# Patient Record
Sex: FEMALE | Race: BLACK OR AFRICAN AMERICAN | NOT HISPANIC OR LATINO | Employment: FULL TIME | URBAN - METROPOLITAN AREA
[De-identification: names, ages, dates, MRNs, and addresses within clinical notes are randomized per-mention and may not be internally consistent; named-entity substitution may affect disease eponyms.]

---
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
## 2022-03-06 ENCOUNTER — HOSPITAL ENCOUNTER (EMERGENCY)
Facility: HOSPITAL | Age: 22
Discharge: HOME | End: 2022-03-06
Attending: EMERGENCY MEDICINE
Payer: MEDICAID

## 2022-03-06 VITALS
TEMPERATURE: 98 F | OXYGEN SATURATION: 100 % | DIASTOLIC BLOOD PRESSURE: 79 MMHG | SYSTOLIC BLOOD PRESSURE: 140 MMHG | RESPIRATION RATE: 18 BRPM | HEART RATE: 72 BPM | HEIGHT: 61 IN | WEIGHT: 220 LBS | BODY MASS INDEX: 41.54 KG/M2
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
## 2022-03-06 LAB
B-HCG UR QL: NEGATIVE
CTP QC/QA: YES

## 2022-03-06 PROCEDURE — 25000003 PHARM REV CODE 250: Performed by: EMERGENCY MEDICINE

## 2022-03-06 PROCEDURE — 81025 URINE PREGNANCY TEST: CPT | Performed by: EMERGENCY MEDICINE

## 2022-03-06 PROCEDURE — 99284 EMERGENCY DEPT VISIT MOD MDM: CPT | Mod: 25

## 2022-03-06 PROCEDURE — 63600175 PHARM REV CODE 636 W HCPCS: Performed by: EMERGENCY MEDICINE

## 2022-03-06 PROCEDURE — 96372 THER/PROPH/DIAG INJ SC/IM: CPT

## 2022-03-06 RX ORDER — KETOROLAC TROMETHAMINE 30 MG/ML
15 INJECTION, SOLUTION INTRAMUSCULAR; INTRAVENOUS
Status: COMPLETED | OUTPATIENT
Start: 2022-03-06 | End: 2022-03-06

## 2022-03-06 RX ORDER — IBUPROFEN 600 MG/1
600 TABLET ORAL EVERY 6 HOURS PRN
Qty: 20 TABLET | Refills: 0 | Status: SHIPPED | OUTPATIENT
Start: 2022-03-06

## 2022-03-06 RX ORDER — KETOROLAC TROMETHAMINE 30 MG/ML
10 INJECTION, SOLUTION INTRAMUSCULAR; INTRAVENOUS
Status: DISCONTINUED | OUTPATIENT
Start: 2022-03-06 | End: 2022-03-06

## 2022-03-06 RX ORDER — DEXTROMETHORPHAN HYDROBROMIDE, GUAIFENESIN 5; 100 MG/5ML; MG/5ML
650 LIQUID ORAL EVERY 8 HOURS
Qty: 30 TABLET | Refills: 0 | Status: SHIPPED | OUTPATIENT
Start: 2022-03-06

## 2022-03-06 RX ORDER — ACETAMINOPHEN 500 MG
1000 TABLET ORAL
Status: COMPLETED | OUTPATIENT
Start: 2022-03-06 | End: 2022-03-06

## 2022-03-06 RX ADMIN — KETOROLAC TROMETHAMINE 15 MG: 30 INJECTION, SOLUTION INTRAMUSCULAR at 10:03

## 2022-03-06 RX ADMIN — ACETAMINOPHEN 1000 MG: 500 TABLET ORAL at 09:03

## 2022-03-06 NOTE — Clinical Note
"Zoran"Marina Dubose was seen and treated in our emergency department on 3/6/2022.  She may return to work on 03/09/2022.       If you have any questions or concerns, please don't hesitate to call.      Gege Burkett MD"
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
## 2022-10-10 ENCOUNTER — HOSPITAL ENCOUNTER (EMERGENCY)
Facility: HOSPITAL | Age: 22
Discharge: LEFT BEFORE DISCHARGE | End: 2022-10-11
Attending: EMERGENCY MEDICINE
Payer: MEDICAID

## 2022-10-10 VITALS
WEIGHT: 205 LBS | DIASTOLIC BLOOD PRESSURE: 96 MMHG | SYSTOLIC BLOOD PRESSURE: 177 MMHG | OXYGEN SATURATION: 100 % | RESPIRATION RATE: 18 BRPM | HEIGHT: 61 IN | HEART RATE: 71 BPM | TEMPERATURE: 99 F | BODY MASS INDEX: 38.71 KG/M2

## 2022-10-10 DIAGNOSIS — R10.9 ABDOMINAL PAIN, UNSPECIFIED ABDOMINAL LOCATION: Primary | ICD-10-CM

## 2022-10-10 LAB
ALBUMIN SERPL BCP-MCNC: 4.2 G/DL (ref 3.5–5.2)
ALP SERPL-CCNC: 61 U/L (ref 55–135)
ALT SERPL W/O P-5'-P-CCNC: 14 U/L (ref 10–44)
ANION GAP SERPL CALC-SCNC: 9 MMOL/L (ref 8–16)
AST SERPL-CCNC: 20 U/L (ref 10–40)
B-HCG UR QL: NEGATIVE
BASOPHILS # BLD AUTO: 0.09 K/UL (ref 0–0.2)
BASOPHILS NFR BLD: 0.6 % (ref 0–1.9)
BILIRUB SERPL-MCNC: 0.5 MG/DL (ref 0.1–1)
BILIRUB UR QL STRIP: NEGATIVE
BUN SERPL-MCNC: 8 MG/DL (ref 6–20)
CALCIUM SERPL-MCNC: 9.5 MG/DL (ref 8.7–10.5)
CHLORIDE SERPL-SCNC: 104 MMOL/L (ref 95–110)
CLARITY UR: CLEAR
CO2 SERPL-SCNC: 27 MMOL/L (ref 23–29)
COLOR UR: YELLOW
CREAT SERPL-MCNC: 0.8 MG/DL (ref 0.5–1.4)
CTP QC/QA: YES
DIFFERENTIAL METHOD: ABNORMAL
EOSINOPHIL # BLD AUTO: 0.9 K/UL (ref 0–0.5)
EOSINOPHIL NFR BLD: 5.3 % (ref 0–8)
ERYTHROCYTE [DISTWIDTH] IN BLOOD BY AUTOMATED COUNT: 16.2 % (ref 11.5–14.5)
EST. GFR  (NO RACE VARIABLE): >60 ML/MIN/1.73 M^2
GLUCOSE SERPL-MCNC: 76 MG/DL (ref 70–110)
GLUCOSE UR QL STRIP: NEGATIVE
HCT VFR BLD AUTO: 39.8 % (ref 37–48.5)
HGB BLD-MCNC: 12.3 G/DL (ref 12–16)
HGB UR QL STRIP: NEGATIVE
IMM GRANULOCYTES # BLD AUTO: 0.05 K/UL (ref 0–0.04)
IMM GRANULOCYTES NFR BLD AUTO: 0.3 % (ref 0–0.5)
KETONES UR QL STRIP: NEGATIVE
LEUKOCYTE ESTERASE UR QL STRIP: NEGATIVE
LIPASE SERPL-CCNC: 24 U/L (ref 4–60)
LYMPHOCYTES # BLD AUTO: 4.1 K/UL (ref 1–4.8)
LYMPHOCYTES NFR BLD: 25.9 % (ref 18–48)
MCH RBC QN AUTO: 24.5 PG (ref 27–31)
MCHC RBC AUTO-ENTMCNC: 30.9 G/DL (ref 32–36)
MCV RBC AUTO: 79 FL (ref 82–98)
MONOCYTES # BLD AUTO: 0.8 K/UL (ref 0.3–1)
MONOCYTES NFR BLD: 5.1 % (ref 4–15)
NEUTROPHILS # BLD AUTO: 10 K/UL (ref 1.8–7.7)
NEUTROPHILS NFR BLD: 62.8 % (ref 38–73)
NITRITE UR QL STRIP: NEGATIVE
NRBC BLD-RTO: 0 /100 WBC
PH UR STRIP: 7 [PH] (ref 5–8)
PLATELET # BLD AUTO: 402 K/UL (ref 150–450)
PMV BLD AUTO: 9.8 FL (ref 9.2–12.9)
POTASSIUM SERPL-SCNC: 4.2 MMOL/L (ref 3.5–5.1)
PROT SERPL-MCNC: 7.6 G/DL (ref 6–8.4)
PROT UR QL STRIP: ABNORMAL
RBC # BLD AUTO: 5.02 M/UL (ref 4–5.4)
SODIUM SERPL-SCNC: 140 MMOL/L (ref 136–145)
SP GR UR STRIP: 1.03 (ref 1–1.03)
URN SPEC COLLECT METH UR: ABNORMAL
UROBILINOGEN UR STRIP-ACNC: NEGATIVE EU/DL
WBC # BLD AUTO: 15.98 K/UL (ref 3.9–12.7)

## 2022-10-10 PROCEDURE — 83690 ASSAY OF LIPASE: CPT | Performed by: EMERGENCY MEDICINE

## 2022-10-10 PROCEDURE — 81003 URINALYSIS AUTO W/O SCOPE: CPT | Performed by: EMERGENCY MEDICINE

## 2022-10-10 PROCEDURE — 81025 URINE PREGNANCY TEST: CPT | Performed by: EMERGENCY MEDICINE

## 2022-10-10 PROCEDURE — 85025 COMPLETE CBC W/AUTO DIFF WBC: CPT | Performed by: EMERGENCY MEDICINE

## 2022-10-10 PROCEDURE — 25500020 PHARM REV CODE 255: Performed by: EMERGENCY MEDICINE

## 2022-10-10 PROCEDURE — 99281 EMR DPT VST MAYX REQ PHY/QHP: CPT | Mod: 25

## 2022-10-10 PROCEDURE — 80053 COMPREHEN METABOLIC PANEL: CPT | Performed by: EMERGENCY MEDICINE

## 2022-10-10 RX ADMIN — IOHEXOL 100 ML: 350 INJECTION, SOLUTION INTRAVENOUS at 08:10

## 2022-10-11 NOTE — ED PROVIDER NOTES
"BP (!) 177/96   Pulse 71   Temp 98.6 °F (37 °C) (Oral)   Resp 18   Ht 5' 1" (1.549 m)   Wt 93 kg (205 lb)   LMP 09/28/2022   SpO2 100%   BMI 38.73 kg/m²       Presented with abdominal pain. Had protocol eval. Unable to get to ED bed given bed availability. Noted hypertension, increased wbc, normal UA. CT stable. Further eval not available as patient had left the department. Discharged as elopement.    Jorgito Mckeon MD MPH  10/11/2022 2:18 PM        Jorgito Mckeon Jr., MD  10/11/22 1418    "

## 2023-03-27 ENCOUNTER — HOSPITAL ENCOUNTER (EMERGENCY)
Facility: HOSPITAL | Age: 23
Discharge: HOME | End: 2023-03-28
Attending: EMERGENCY MEDICINE
Payer: MEDICAID

## 2023-03-27 DIAGNOSIS — Z3A.01 LESS THAN 8 WEEKS GESTATION OF PREGNANCY: ICD-10-CM

## 2023-03-27 DIAGNOSIS — R11.2 NAUSEA AND VOMITING, UNSPECIFIED VOMITING TYPE: Primary | ICD-10-CM

## 2023-03-27 LAB
ALBUMIN SERPL BCP-MCNC: 4.2 G/DL (ref 3.5–5.2)
ALP SERPL-CCNC: 64 U/L (ref 55–135)
ALT SERPL W/O P-5'-P-CCNC: 16 U/L (ref 10–44)
ANION GAP SERPL CALC-SCNC: 10 MMOL/L (ref 8–16)
AST SERPL-CCNC: 30 U/L (ref 10–40)
B-HCG UR QL: POSITIVE
BACTERIA #/AREA URNS HPF: NEGATIVE /HPF
BASOPHILS # BLD AUTO: 0.03 K/UL (ref 0–0.2)
BASOPHILS NFR BLD: 0.2 % (ref 0–1.9)
BILIRUB SERPL-MCNC: 1 MG/DL (ref 0.1–1)
BILIRUB UR QL STRIP: NEGATIVE
BUN SERPL-MCNC: 11 MG/DL (ref 6–20)
CALCIUM SERPL-MCNC: 8.9 MG/DL (ref 8.7–10.5)
CHLORIDE SERPL-SCNC: 104 MMOL/L (ref 95–110)
CLARITY UR: CLEAR
CO2 SERPL-SCNC: 23 MMOL/L (ref 23–29)
COLOR UR: YELLOW
CREAT SERPL-MCNC: 0.7 MG/DL (ref 0.5–1.4)
CTP QC/QA: YES
DIFFERENTIAL METHOD: ABNORMAL
EOSINOPHIL # BLD AUTO: 0 K/UL (ref 0–0.5)
EOSINOPHIL NFR BLD: 0 % (ref 0–8)
ERYTHROCYTE [DISTWIDTH] IN BLOOD BY AUTOMATED COUNT: 15.4 % (ref 11.5–14.5)
EST. GFR  (NO RACE VARIABLE): >60 ML/MIN/1.73 M^2
GLUCOSE SERPL-MCNC: 85 MG/DL (ref 70–110)
GLUCOSE UR QL STRIP: NEGATIVE
HCG INTACT+B SERPL-ACNC: NORMAL MIU/ML
HCT VFR BLD AUTO: 35.6 % (ref 37–48.5)
HGB BLD-MCNC: 11.3 G/DL (ref 12–16)
HGB UR QL STRIP: NEGATIVE
HYALINE CASTS #/AREA URNS LPF: 5 /LPF
IMM GRANULOCYTES # BLD AUTO: 0.07 K/UL (ref 0–0.04)
IMM GRANULOCYTES NFR BLD AUTO: 0.5 % (ref 0–0.5)
KETONES UR QL STRIP: ABNORMAL
LEUKOCYTE ESTERASE UR QL STRIP: NEGATIVE
LIPASE SERPL-CCNC: 26 U/L (ref 4–60)
LYMPHOCYTES # BLD AUTO: 1.8 K/UL (ref 1–4.8)
LYMPHOCYTES NFR BLD: 12.4 % (ref 18–48)
MCH RBC QN AUTO: 25.1 PG (ref 27–31)
MCHC RBC AUTO-ENTMCNC: 31.7 G/DL (ref 32–36)
MCV RBC AUTO: 79 FL (ref 82–98)
MICROSCOPIC COMMENT: ABNORMAL
MONOCYTES # BLD AUTO: 0.6 K/UL (ref 0.3–1)
MONOCYTES NFR BLD: 4.1 % (ref 4–15)
NEUTROPHILS # BLD AUTO: 12 K/UL (ref 1.8–7.7)
NEUTROPHILS NFR BLD: 82.8 % (ref 38–73)
NITRITE UR QL STRIP: NEGATIVE
NRBC BLD-RTO: 0 /100 WBC
PH UR STRIP: 7 [PH] (ref 5–8)
PLATELET # BLD AUTO: 448 K/UL (ref 150–450)
PMV BLD AUTO: 9.8 FL (ref 9.2–12.9)
POTASSIUM SERPL-SCNC: 3.7 MMOL/L (ref 3.5–5.1)
PROT SERPL-MCNC: 7.8 G/DL (ref 6–8.4)
PROT UR QL STRIP: ABNORMAL
RBC # BLD AUTO: 4.5 M/UL (ref 4–5.4)
RBC #/AREA URNS HPF: 1 /HPF (ref 0–4)
SODIUM SERPL-SCNC: 137 MMOL/L (ref 136–145)
SP GR UR STRIP: >1.03 (ref 1–1.03)
SQUAMOUS #/AREA URNS HPF: 4 /HPF
URN SPEC COLLECT METH UR: ABNORMAL
UROBILINOGEN UR STRIP-ACNC: ABNORMAL EU/DL
WBC # BLD AUTO: 14.55 K/UL (ref 3.9–12.7)
WBC #/AREA URNS HPF: 1 /HPF (ref 0–5)

## 2023-03-27 PROCEDURE — 81001 URINALYSIS AUTO W/SCOPE: CPT

## 2023-03-27 PROCEDURE — 84702 CHORIONIC GONADOTROPIN TEST: CPT

## 2023-03-27 PROCEDURE — 80053 COMPREHEN METABOLIC PANEL: CPT

## 2023-03-27 PROCEDURE — 99284 EMERGENCY DEPT VISIT MOD MDM: CPT

## 2023-03-27 PROCEDURE — 81025 URINE PREGNANCY TEST: CPT

## 2023-03-27 PROCEDURE — 85025 COMPLETE CBC W/AUTO DIFF WBC: CPT

## 2023-03-27 PROCEDURE — 83690 ASSAY OF LIPASE: CPT

## 2023-03-27 NOTE — FIRST PROVIDER EVALUATION
"Medical screening examination initiated.  I have conducted a focused provider triage encounter, findings are as follows:    Brief history of present illness:  Patient presents to ED for vomiting bright red blood.  Patient states it started this morning.  Patient states now when she vomits it is brown.  Patient states she went out drinking last night and drank a lot.  Patient denies any fever or diarrhea.    Vitals:    03/27/23 1451   BP: (!) 157/103   Pulse: 62   Resp: 16   Temp: 97.9 °F (36.6 °C)   TempSrc: Oral   SpO2: 100%   Weight: 90.9 kg (200 lb 6.4 oz)   Height: 5' 1" (1.549 m)       Pertinent physical exam:  Patient is awake and alert in no acute distress.    Brief workup plan:  Lab work, urine    Preliminary workup initiated; this workup will be continued and followed by the physician or advanced practice provider that is assigned to the patient when roomed.  "

## 2023-03-28 VITALS
HEIGHT: 61 IN | RESPIRATION RATE: 16 BRPM | DIASTOLIC BLOOD PRESSURE: 63 MMHG | BODY MASS INDEX: 37.83 KG/M2 | HEART RATE: 71 BPM | SYSTOLIC BLOOD PRESSURE: 116 MMHG | WEIGHT: 200.38 LBS | TEMPERATURE: 99 F | OXYGEN SATURATION: 100 %

## 2023-03-28 PROCEDURE — 25000003 PHARM REV CODE 250: Performed by: EMERGENCY MEDICINE

## 2023-03-28 PROCEDURE — 96365 THER/PROPH/DIAG IV INF INIT: CPT

## 2023-03-28 PROCEDURE — 63600175 PHARM REV CODE 636 W HCPCS: Performed by: EMERGENCY MEDICINE

## 2023-03-28 RX ORDER — PROMETHAZINE HYDROCHLORIDE 25 MG/1
25 TABLET ORAL EVERY 6 HOURS PRN
Qty: 15 TABLET | Refills: 0 | Status: SHIPPED | OUTPATIENT
Start: 2023-03-28

## 2023-03-28 RX ADMIN — SODIUM CHLORIDE, SODIUM LACTATE, POTASSIUM CHLORIDE, AND CALCIUM CHLORIDE 1000 ML: .6; .31; .03; .02 INJECTION, SOLUTION INTRAVENOUS at 12:03

## 2023-03-28 RX ADMIN — PROMETHAZINE HYDROCHLORIDE 12.5 MG: 25 INJECTION INTRAMUSCULAR; INTRAVENOUS at 12:03

## 2023-03-28 NOTE — ED PROVIDER NOTES
"Encounter Date: 3/27/2023       History     Chief Complaint   Patient presents with    Hemoptysis     Blood in vomit starting today, denies abd pain, vomiting because "I went out last night"     23-year-old female presented emergency department complaining of nausea and vomiting which started yesterday after she went out and was drinking.  Patient also said she vomited some blood along with vomit.  Patient said vomiting stopped and now feeling better however still has some nausea.  Patient upon arrival had a positive pregnancy test today.  Patient's last menstrual period was about a month ago.  Patient denies any abdominal pain.  Denies weakness or numbness.  Denies chest pain or shortness of breath.  Patient said she was drinking yesterday however did not know that she was pregnant.    Review of patient's allergies indicates:  No Known Allergies  Past Medical History:   Diagnosis Date    Hypertension      No past surgical history on file.  Family History   Problem Relation Age of Onset    No Known Problems Mother     Seizures Father     Hypertension Maternal Grandmother     Diabetes Maternal Grandmother     Hypertension Paternal Grandmother     Diabetes Paternal Grandmother     Hypertension Paternal Grandfather     Congenital heart disease Neg Hx     Arrhythmia Neg Hx     Pacemaker/defibrilator Neg Hx      Social History     Tobacco Use    Smoking status: Never   Substance Use Topics    Alcohol use: No    Drug use: No     Review of Systems   Constitutional: Negative.    HENT: Negative.     Eyes: Negative.    Respiratory: Negative.     Cardiovascular: Negative.  Negative for chest pain.   Gastrointestinal:  Positive for nausea and vomiting.   Endocrine: Negative.    Genitourinary: Negative.    Musculoskeletal: Negative.    Skin: Negative.    Allergic/Immunologic: Negative.    Neurological: Negative.    Hematological: Negative.    Psychiatric/Behavioral: Negative.     All other systems reviewed and are " negative.    Physical Exam     Initial Vitals [03/27/23 1451]   BP Pulse Resp Temp SpO2   (!) 157/103 62 16 97.9 °F (36.6 °C) 100 %      MAP       --         Physical Exam    Nursing note and vitals reviewed.  Constitutional: She appears well-developed and well-nourished.   HENT:   Head: Normocephalic and atraumatic.   Nose: Nose normal.   Mouth/Throat: Oropharynx is clear and moist.   Eyes: Conjunctivae and EOM are normal. Pupils are equal, round, and reactive to light.   Neck: Neck supple. No thyromegaly present. No tracheal deviation present. No JVD present.   Normal range of motion.  Cardiovascular:  Normal rate, regular rhythm, normal heart sounds and intact distal pulses.           No murmur heard.  Pulmonary/Chest: Breath sounds normal. No stridor. No respiratory distress. She has no wheezes. She has no rales.   Abdominal: Abdomen is soft. Bowel sounds are normal. She exhibits no distension. There is no abdominal tenderness.   Musculoskeletal:         General: No edema. Normal range of motion.      Cervical back: Normal range of motion and neck supple.     Neurological: She is alert and oriented to person, place, and time. She has normal strength. GCS score is 15. GCS eye subscore is 4. GCS verbal subscore is 5. GCS motor subscore is 6.   Skin: Skin is warm. Capillary refill takes less than 2 seconds.   Psychiatric: She has a normal mood and affect. Thought content normal.       ED Course   Procedures  Labs Reviewed   CBC W/ AUTO DIFFERENTIAL - Abnormal; Notable for the following components:       Result Value    WBC 14.55 (*)     Hemoglobin 11.3 (*)     Hematocrit 35.6 (*)     MCV 79 (*)     MCH 25.1 (*)     MCHC 31.7 (*)     RDW 15.4 (*)     Gran # (ANC) 12.0 (*)     Immature Grans (Abs) 0.07 (*)     Gran % 82.8 (*)     Lymph % 12.4 (*)     All other components within normal limits    Narrative:     For upper or mid abdominal pain.   URINALYSIS, REFLEX TO URINE CULTURE - Abnormal; Notable for the following  components:    Specific Gravity, UA >1.030 (*)     Protein, UA 1+ (*)     Ketones, UA 3+ (*)     Urobilinogen, UA 2.0-3.0 (*)     All other components within normal limits    Narrative:     In and Out Cath as needed it patient unable to void  Specimen Source->Urine   URINALYSIS MICROSCOPIC - Abnormal; Notable for the following components:    Hyaline Casts, UA 5 (*)     All other components within normal limits    Narrative:     In and Out Cath as needed it patient unable to void  Specimen Source->Urine   POCT URINE PREGNANCY - Abnormal; Notable for the following components:    POC Preg Test, Ur Positive (*)     All other components within normal limits   COMPREHENSIVE METABOLIC PANEL    Narrative:     For upper or mid abdominal pain.   LIPASE    Narrative:     For upper or mid abdominal pain.   HCG, QUANTITATIVE    Narrative:     Release to patient->Immediate          Imaging Results    None          Medications   lactated ringers bolus 1,000 mL (has no administration in time range)   promethazine (PHENERGAN) 12.5 mg in dextrose 5 % (D5W) 50 mL IVPB (has no administration in time range)     Medical Decision Making:   Differential Diagnosis:   23-year-old female presented emergency department with nausea and vomiting and vomited some blood yesterday.  Patient was drinking yesterday and was retching.  Likely had a mild Makayla-Mohan tear and now bleeding resolved.  Patient's symptoms did improve spontaneously.  Patient just found out she is pregnant in the emergency department.  Patient has nausea vomiting could be secondary to alcohol and also possibility is this could be secondary to her being pregnant.  Patient's symptoms did improve.  Patient hemodynamically stable.  Hemoglobin is stable as well.  Patient gently hydrated.  Nausea treated.  Discharged with return precautions and instructions and follow-up.  No more bleeding noted after that episode.  No abdominal pain or tenderness.  Clinical Tests:   Lab Tests:  Reviewed                        Clinical Impression:   Final diagnoses:  [R11.2] Nausea and vomiting, unspecified vomiting type (Primary)  [Z3A.01] Less than 8 weeks gestation of pregnancy        ED Disposition Condition    Discharge Stable          ED Prescriptions       Medication Sig Dispense Start Date End Date Auth. Provider    promethazine (PHENERGAN) 25 MG tablet Take 1 tablet (25 mg total) by mouth every 6 (six) hours as needed for Nausea. 15 tablet 3/28/2023 -- Chel Brown MD          Follow-up Information       Follow up With Specialties Details Why Contact Info    June Zaragoza MD Pediatrics In 2 days  [address] [place] [address]  [phone]      Lisandra Martin MD Obstetrics and Gynecology In 2 days  [address] [facility]  [place] [address]  [phone]               Chel Brown MD  03/28/23 0033       Chel Brown MD  03/28/23 0033

## 2023-04-04 NOTE — PATIENT INSTRUCTIONS
Patient is aware of negative celiac screen results  Plan:  Spoke to pt and gr parents regarding complications related to obesity and re enforced diet and exercise  Reg no added salt diet  Diet and exercise modification  Check BP at home with large size adult cuff and record. Call if consistently >130/80 mm Hg  RTC in 6 months or PRN

## 2023-06-11 ENCOUNTER — HOSPITAL ENCOUNTER (EMERGENCY)
Facility: HOSPITAL | Age: 23
Discharge: HOME | End: 2023-06-11
Attending: EMERGENCY MEDICINE
Payer: MEDICAID

## 2023-06-11 VITALS
BODY MASS INDEX: 41.35 KG/M2 | TEMPERATURE: 98 F | SYSTOLIC BLOOD PRESSURE: 128 MMHG | WEIGHT: 219 LBS | OXYGEN SATURATION: 100 % | HEART RATE: 72 BPM | RESPIRATION RATE: 18 BRPM | DIASTOLIC BLOOD PRESSURE: 79 MMHG | HEIGHT: 61 IN

## 2023-06-11 DIAGNOSIS — R10.9 ABDOMINAL PAIN: ICD-10-CM

## 2023-06-11 DIAGNOSIS — R10.9 FLANK PAIN: Primary | ICD-10-CM

## 2023-06-11 LAB
ALBUMIN SERPL BCP-MCNC: 3.4 G/DL (ref 3.5–5.2)
ALP SERPL-CCNC: 51 U/L (ref 55–135)
ALT SERPL W/O P-5'-P-CCNC: 13 U/L (ref 10–44)
ANION GAP SERPL CALC-SCNC: 8 MMOL/L (ref 8–16)
AST SERPL-CCNC: 15 U/L (ref 10–40)
BACTERIA #/AREA URNS HPF: NEGATIVE /HPF
BASOPHILS # BLD AUTO: 0.06 K/UL (ref 0–0.2)
BASOPHILS NFR BLD: 0.3 % (ref 0–1.9)
BILIRUB SERPL-MCNC: 0.3 MG/DL (ref 0.1–1)
BILIRUB UR QL STRIP: NEGATIVE
BUN SERPL-MCNC: 13 MG/DL (ref 6–20)
CALCIUM SERPL-MCNC: 8.8 MG/DL (ref 8.7–10.5)
CHLORIDE SERPL-SCNC: 107 MMOL/L (ref 95–110)
CLARITY UR: CLEAR
CO2 SERPL-SCNC: 22 MMOL/L (ref 23–29)
COLOR UR: YELLOW
CREAT SERPL-MCNC: 0.7 MG/DL (ref 0.5–1.4)
DIFFERENTIAL METHOD: ABNORMAL
EOSINOPHIL # BLD AUTO: 0.1 K/UL (ref 0–0.5)
EOSINOPHIL NFR BLD: 0.5 % (ref 0–8)
ERYTHROCYTE [DISTWIDTH] IN BLOOD BY AUTOMATED COUNT: 15.2 % (ref 11.5–14.5)
EST. GFR  (NO RACE VARIABLE): >60 ML/MIN/1.73 M^2
GLUCOSE SERPL-MCNC: 106 MG/DL (ref 70–110)
GLUCOSE UR QL STRIP: NEGATIVE
HCT VFR BLD AUTO: 35.7 % (ref 37–48.5)
HGB BLD-MCNC: 11.5 G/DL (ref 12–16)
HGB UR QL STRIP: ABNORMAL
HYALINE CASTS #/AREA URNS LPF: 5 /LPF
IMM GRANULOCYTES # BLD AUTO: 0.1 K/UL (ref 0–0.04)
IMM GRANULOCYTES NFR BLD AUTO: 0.5 % (ref 0–0.5)
KETONES UR QL STRIP: ABNORMAL
LEUKOCYTE ESTERASE UR QL STRIP: NEGATIVE
LIPASE SERPL-CCNC: 26 U/L (ref 4–60)
LYMPHOCYTES # BLD AUTO: 3.1 K/UL (ref 1–4.8)
LYMPHOCYTES NFR BLD: 16.9 % (ref 18–48)
MCH RBC QN AUTO: 25.6 PG (ref 27–31)
MCHC RBC AUTO-ENTMCNC: 32.2 G/DL (ref 32–36)
MCV RBC AUTO: 80 FL (ref 82–98)
MICROSCOPIC COMMENT: ABNORMAL
MONOCYTES # BLD AUTO: 1.1 K/UL (ref 0.3–1)
MONOCYTES NFR BLD: 5.7 % (ref 4–15)
NEUTROPHILS # BLD AUTO: 13.9 K/UL (ref 1.8–7.7)
NEUTROPHILS NFR BLD: 76.1 % (ref 38–73)
NITRITE UR QL STRIP: NEGATIVE
NRBC BLD-RTO: 0 /100 WBC
PH UR STRIP: 6 [PH] (ref 5–8)
PLATELET # BLD AUTO: 360 K/UL (ref 150–450)
PMV BLD AUTO: 9.7 FL (ref 9.2–12.9)
POTASSIUM SERPL-SCNC: 3.9 MMOL/L (ref 3.5–5.1)
PROT SERPL-MCNC: 6.9 G/DL (ref 6–8.4)
PROT UR QL STRIP: ABNORMAL
RBC # BLD AUTO: 4.49 M/UL (ref 4–5.4)
RBC #/AREA URNS HPF: 2 /HPF (ref 0–4)
SODIUM SERPL-SCNC: 137 MMOL/L (ref 136–145)
SP GR UR STRIP: 1.02 (ref 1–1.03)
SQUAMOUS #/AREA URNS HPF: 5 /HPF
URN SPEC COLLECT METH UR: ABNORMAL
UROBILINOGEN UR STRIP-ACNC: NEGATIVE EU/DL
WBC # BLD AUTO: 18.27 K/UL (ref 3.9–12.7)
WBC #/AREA URNS HPF: 6 /HPF (ref 0–5)

## 2023-06-11 PROCEDURE — 85025 COMPLETE CBC W/AUTO DIFF WBC: CPT | Performed by: EMERGENCY MEDICINE

## 2023-06-11 PROCEDURE — 81001 URINALYSIS AUTO W/SCOPE: CPT | Performed by: EMERGENCY MEDICINE

## 2023-06-11 PROCEDURE — 25000003 PHARM REV CODE 250: Performed by: EMERGENCY MEDICINE

## 2023-06-11 PROCEDURE — 80053 COMPREHEN METABOLIC PANEL: CPT | Performed by: EMERGENCY MEDICINE

## 2023-06-11 PROCEDURE — 83690 ASSAY OF LIPASE: CPT | Performed by: EMERGENCY MEDICINE

## 2023-06-11 PROCEDURE — 99284 EMERGENCY DEPT VISIT MOD MDM: CPT | Mod: 25

## 2023-06-11 RX ORDER — CEFUROXIME AXETIL 250 MG/1
500 TABLET ORAL
Status: COMPLETED | OUTPATIENT
Start: 2023-06-11 | End: 2023-06-11

## 2023-06-11 RX ORDER — ONDANSETRON 4 MG/1
4 TABLET, FILM COATED ORAL EVERY 6 HOURS
Qty: 12 TABLET | Refills: 0 | Status: SHIPPED | OUTPATIENT
Start: 2023-06-11

## 2023-06-11 RX ORDER — CEFUROXIME AXETIL 500 MG/1
500 TABLET ORAL EVERY 12 HOURS
Qty: 14 TABLET | Refills: 0 | Status: SHIPPED | OUTPATIENT
Start: 2023-06-11 | End: 2023-06-18

## 2023-06-11 RX ORDER — ACETAMINOPHEN 500 MG
1000 TABLET ORAL
Status: COMPLETED | OUTPATIENT
Start: 2023-06-11 | End: 2023-06-11

## 2023-06-11 RX ADMIN — CEFUROXIME AXETIL 500 MG: 250 TABLET, FILM COATED ORAL at 03:06

## 2023-06-11 RX ADMIN — ACETAMINOPHEN 1000 MG: 500 TABLET, FILM COATED ORAL at 03:06

## 2023-06-11 NOTE — ED PROVIDER NOTES
Encounter Date: 6/11/2023       History     Chief Complaint   Patient presents with    Abdominal Pain     R flank pain wraps around to front; approx 16 wks pregnant     This is a 23-year-old, 16 weeks pregnant, presenting with right flank pain.  Pain began tonight after getting off work.  The pain radiates into her right mid abdomen.  She is associated nausea and vomiting.  She denies any dysuria or frequency or hematuria.  She denies any vaginal bleeding or fever.  She denies history of kidney stones.  She denies similar episodes in the past.    The history is provided by the patient.   Review of patient's allergies indicates:  No Known Allergies  Past Medical History:   Diagnosis Date    Hypertension      No past surgical history on file.  Family History   Problem Relation Age of Onset    No Known Problems Mother     Seizures Father     Hypertension Maternal Grandmother     Diabetes Maternal Grandmother     Hypertension Paternal Grandmother     Diabetes Paternal Grandmother     Hypertension Paternal Grandfather     Congenital heart disease Neg Hx     Arrhythmia Neg Hx     Pacemaker/defibrilator Neg Hx      Social History     Tobacco Use    Smoking status: Never   Substance Use Topics    Alcohol use: No    Drug use: No     Review of Systems   Gastrointestinal:  Positive for abdominal pain and nausea.   Genitourinary:  Positive for flank pain.   All other systems reviewed and are negative.    Physical Exam     Initial Vitals [06/11/23 0134]   BP Pulse Resp Temp SpO2   (!) 150/82 80 18 98.1 °F (36.7 °C) 100 %      MAP       --         Physical Exam    Nursing note and vitals reviewed.  Constitutional: She appears well-developed and well-nourished. She is not diaphoretic. No distress.   HENT:   Head: Normocephalic.   Eyes: Conjunctivae are normal.   Neck: Neck supple.   Normal range of motion.  Cardiovascular:  Normal rate.           Pulmonary/Chest: No respiratory distress.   Abdominal: She exhibits no distension.  There is no abdominal tenderness.   Musculoskeletal:         General: No tenderness or edema.      Cervical back: Normal range of motion and neck supple.     Neurological: She is alert. She has normal strength. GCS eye subscore is 4. GCS verbal subscore is 5. GCS motor subscore is 6.   Skin: Skin is warm and dry.   Psychiatric: She has a normal mood and affect.       ED Course   Procedures  Labs Reviewed   CBC W/ AUTO DIFFERENTIAL - Abnormal; Notable for the following components:       Result Value    WBC 18.27 (*)     Hemoglobin 11.5 (*)     Hematocrit 35.7 (*)     MCV 80 (*)     MCH 25.6 (*)     RDW 15.2 (*)     Gran # (ANC) 13.9 (*)     Immature Grans (Abs) 0.10 (*)     Mono # 1.1 (*)     Gran % 76.1 (*)     Lymph % 16.9 (*)     All other components within normal limits   COMPREHENSIVE METABOLIC PANEL - Abnormal; Notable for the following components:    CO2 22 (*)     Albumin 3.4 (*)     Alkaline Phosphatase 51 (*)     All other components within normal limits   URINALYSIS, REFLEX TO URINE CULTURE - Abnormal; Notable for the following components:    Protein, UA Trace (*)     Ketones, UA 1+ (*)     Occult Blood UA 1+ (*)     All other components within normal limits    Narrative:     Specimen Source->Urine   URINALYSIS MICROSCOPIC - Abnormal; Notable for the following components:    WBC, UA 6 (*)     Hyaline Casts, UA 5 (*)     All other components within normal limits    Narrative:     Specimen Source->Urine   LIPASE          Imaging Results              US Abdomen Complete (Final result)  Result time 06/11/23 03:06:40      Final result by Yasemin Zavala DO (06/11/23 03:06:40)                   Narrative:    EXAM:  US Abdomen Complete    FACILITY:  [facility]    REFERRING:  AAAREFERRAL SELF    CLINICAL HISTORY:  23 years, Female; flank pain    TECHNIQUE:  Real-time ultrasound of the abdomen with image documentation.    COMPARISON:  No relevant prior studies available.    FINDINGS:  Liver:  The  liver measures 17.4 cm in length. No focal lesions.  The main portal vein demonstrates hepatopedal flow.  No intrahepatic bile duct dilation.  Gallbladder:  No cholelithiasis or gallbladder wall thickening. Negative sonographic Aparicio sign. The common bile duct is not dilated.  Common bile duct:  Unremarkable as visualized.  No stones.  No dilation.  Pancreas:  The visualized pancreas is unremarkable.  Kidneys:  The right kidney measures 11.63 x 5.53 x 6.43 cm. And the left kidney measures 10.7 x 6.69 x 4.67 cm in length. No hydronephrosis or nephrolithiasis.  Spleen:  Spleen measures 9.83 cm in length.  Aorta:  No acute pathology.  No aneurysm.  Inferior vena cava:  No acute pathology.    IMPRESSION:  No acute pathology.    Electronically signed by:  Yasemin Zavala DO  6/11/2023 3:06 AM CDT Workstation: [id number]                                     US OB 14+ Wks, TransAbd, Single Gestation (Final result)  Result time 06/11/23 03:15:50      Final result by Luis Antonio Chang MD (06/11/23 03:15:50)                   Narrative:    Ultrasound OB pregnancy limited on 6/11/2023    CLINICAL INDICATION: Pregnant, generalized abdominal pain, back pain    COMPARISON: None    FINDINGS: Limited sonographic images are obtained throughout the pelvis by transabdominal approach only, both transverse and sagittal images are obtained. Single living intrauterine fetus is noted currently in cephalic presentation. Placenta is posterior in location with no evidence of placental abruption. The placental tip and the internal cervical os are not well-visualized and follow-up of the placental positioning will be needed on the patient's 20 week ultrasound as this appears to be at least low-lying. Positive fetal cardiac activity is noted with a fetal heart rate of 140 bpm. Estimated gestational age by fetal measurements is an approximate 16 week 5 day gestation +/- 1 week one day giving an estimated due date of 11/21/2023. Estimated  fetal weight is 155.83 g +/- is 23.38 g. Adequate amniotic fluid is visualized.    BPD= 3.64 cm consistent with 17 week one day    HC= 13.23 cm consistent with 16 week 6 day    A.c.= 10.5 cm consistent with 16 week 3 day    FL= 2.1 cm consistent with a 16 week 2 day    IMPRESSION: Single living approximate 16 week 5 day intrauterine fetus with no acute abnormality noted.    Electronically signed by:  John Chang  6/11/2023 3:15 AM CDT Workstation: [id number]                                     Medications   acetaminophen tablet 1,000 mg (has no administration in time range)   cefUROXime tablet 500 mg (has no administration in time range)     Medical Decision Making:   Initial Assessment:   Sixteen week pregnant female with right flank and abdominal pain.  Abdominal exam is benign without reproducible tenderness.  She does have a leukocytosis of 18.  LFTs and lipase are normal.  Ultrasound shows normal kidneys without hydronephrosis, normal gallbladder, normal intrauterine pregnancy.  UA shows very small amount of WBCs.  There is no CVA tenderness and I do not think this represents pyelonephritis.  We will treat for possible UTI with cefuroxime.  Discharged home with return precautions.  Differential Diagnosis:   Cholecystitis, pancreatitis, ureterolithiasis, pyelonephritis                        Clinical Impression:   Final diagnoses:  [R10.9] Abdominal pain  [R10.9] Flank pain (Primary)        ED Disposition Condition    Discharge Stable          ED Prescriptions       Medication Sig Dispense Start Date End Date Auth. Provider    ondansetron (ZOFRAN) 4 MG tablet Take 1 tablet (4 mg total) by mouth every 6 (six) hours. 12 tablet 6/11/2023 -- David Lopez MD    cefUROXime (CEFTIN) 500 MG tablet Take 1 tablet (500 mg total) by mouth every 12 (twelve) hours. for 7 days 14 tablet 6/11/2023 6/18/2023 David Lopez MD          Follow-up Information       Follow up With Specialties Details Why Contact Info     June Zaragoza MD Pediatrics   [address] [facility] [address]  [phone]               David Lopez MD  06/11/23 0346

## 2023-09-19 LAB
HCV AB SERPL QL IA: NEGATIVE
RPR: NEGATIVE
RUBELLA IMMUNE STATUS: NORMAL

## 2023-10-20 ENCOUNTER — HOSPITAL ENCOUNTER (OUTPATIENT)
Facility: HOSPITAL | Age: 23
Discharge: HOME | End: 2023-10-20
Attending: OBSTETRICS & GYNECOLOGY | Admitting: OBSTETRICS & GYNECOLOGY
Payer: MEDICAID

## 2023-10-20 DIAGNOSIS — O36.8190 DECREASED FETAL MOVEMENT: ICD-10-CM

## 2023-10-20 PROCEDURE — 99211 OFF/OP EST MAY X REQ PHY/QHP: CPT | Mod: 25

## 2023-10-20 NOTE — NURSING
[facility]  Department of Obstetrics and Gynecology  Labor & Delivery Triage Assessment    PATIENT NAME: Zoran Dubose  MRN: [id number]  TODAY'S DATE: 10/20/2023    CHIEF COMPLAINT: Decreased Fetal Movement      Dec FM x 2 days. Pt denies VB or LOF. Reports being a patient of Dr Lmoax in [place], [place]. Pt reports only eating eggs, whitney, and a cookie today. Denies being on meds for her CHTN.     Of note, pt reports having tooth pain. RN discussed pain management options that are safe in pregnancy, and getting approval from her OB to go to the dentist.     OB History    Para Term  AB Living   1 0 0 0 0 0   SAB IAB Ectopic Multiple Live Births   0 0 0 0 0      # Outcome Date GA Lbr Zack/2nd Weight Sex Delivery Anes PTL Lv   1 Current              Past Medical History:   Diagnosis Date    Hypertension      No past surgical history on file.      VITAL SIGNS - ABNORMAL VITALS INCLUDE TEMP >100.4,RR <12 or >26, SUSTAINED MATERNAL PULSE <60 or >120     VITAL SIGNS (Most Recent)       VITAL SIGNS     normal  HEADACHE    no     VOMITING    no  VISUAL DISTURBANCES  no  EPIGASTRIC PAIN        no  PROTEINURIA 2+ or MORE             no   EDEMA FACE/EXTREMITIES            no    FETAL MOVEMENT     FETAL MOVEMENT: Decreased  FETAL HEART RATE BASELINE =    140normal  FETAL HEART RATE VARIABILITY:  Minimal  FETAL HEART RATE ACCELERATIONS FOR GESTATIONAL AGE: present  FETAL HEART RATE DECELERATIONS: none    ABDOMINAL PAIN/CRAMPING/CONTRACTIONS     Patient is not complaining of abdominal pain/cramping/contractions.    RUPTURE OF MEMBRANES OR LEAKING OF AMNIOTIC FLUID     Patient denies ROM or leaking of amniotic fluid.    VAGINAL BLEEDING     Patient denies vaginal bleeding.    VAGINAL EXAM     DILATION:    STATION:    EFFACEMENT:    PRESENTATION:      VAGINAL EXAM DEFERRED DUE TO:  Not in Labor    PAIN PRESENT ON ARRIVAL     ONSET:   denies  LOCATION:  [place]  PAIN SCALE (0-10):   denies  DESCRIPTION: denies    Interventions     Oral fluids given and tolerated. BPP in progress upon shift change  Patient signed out to Eli Lopez RN who assumed care.        PATIENT DISPOSITION            Dr. Walden notified at 1827 of the above assessment.    Maricarmen Quinones RN  [facility]  10/20/2023

## 2023-10-21 NOTE — NURSING
pt d/c to home. ambulatory at d/c. no acute distress noted. verbalized understanding of d/c instructions. f/u appt with her OB. no questions at this time. No rx given. Pt aaox4. Resp even and unlabored. Skin warm dry and appropriate in color.

## 2023-11-19 ENCOUNTER — HOSPITAL ENCOUNTER (INPATIENT)
Facility: HOSPITAL | Age: 23
LOS: 4 days | Discharge: HOME | End: 2023-11-23
Attending: SPECIALIST | Admitting: SPECIALIST
Payer: MEDICAID

## 2023-11-19 ENCOUNTER — ANESTHESIA (OUTPATIENT)
Dept: OBSTETRICS AND GYNECOLOGY | Facility: HOSPITAL | Age: 23
End: 2023-11-19
Payer: MEDICAID

## 2023-11-19 ENCOUNTER — ANESTHESIA EVENT (OUTPATIENT)
Dept: OBSTETRICS AND GYNECOLOGY | Facility: HOSPITAL | Age: 23
End: 2023-11-19
Payer: MEDICAID

## 2023-11-19 DIAGNOSIS — N92.0 SPOTTING: ICD-10-CM

## 2023-11-19 DIAGNOSIS — O13.3 GESTATIONAL HYPERTENSION, THIRD TRIMESTER: Primary | ICD-10-CM

## 2023-11-19 DIAGNOSIS — I10 UNCONTROLLED HYPERTENSION: ICD-10-CM

## 2023-11-19 DIAGNOSIS — R58 BLEEDING: ICD-10-CM

## 2023-11-19 LAB
ABO + RH BLD: NORMAL
ALBUMIN SERPL BCP-MCNC: 3.3 G/DL (ref 3.5–5.2)
ALP SERPL-CCNC: 145 U/L (ref 55–135)
ALT SERPL W/O P-5'-P-CCNC: 12 U/L (ref 10–44)
AMPHET+METHAMPHET UR QL: NEGATIVE
ANION GAP SERPL CALC-SCNC: 9 MMOL/L (ref 8–16)
AST SERPL-CCNC: 20 U/L (ref 10–40)
BACTERIA #/AREA URNS HPF: ABNORMAL /HPF
BARBITURATES UR QL SCN>200 NG/ML: NEGATIVE
BASOPHILS # BLD AUTO: 0.05 K/UL (ref 0–0.2)
BASOPHILS NFR BLD: 0.4 % (ref 0–1.9)
BENZODIAZ UR QL SCN>200 NG/ML: NEGATIVE
BILIRUB SERPL-MCNC: 0.3 MG/DL (ref 0.1–1)
BILIRUB UR QL STRIP: NEGATIVE
BLD GP AB SCN CELLS X3 SERPL QL: NORMAL
BUN SERPL-MCNC: 8 MG/DL (ref 6–20)
BUPRENORPHINE UR QL: NEGATIVE
BZE UR QL SCN: NEGATIVE
CALCIUM SERPL-MCNC: 8.9 MG/DL (ref 8.7–10.5)
CANNABINOIDS UR QL SCN: ABNORMAL
CHLORIDE SERPL-SCNC: 107 MMOL/L (ref 95–110)
CLARITY UR: ABNORMAL
CO2 SERPL-SCNC: 19 MMOL/L (ref 23–29)
COLOR UR: YELLOW
CREAT SERPL-MCNC: 0.9 MG/DL (ref 0.5–1.4)
CREAT UR-MCNC: 399.7 MG/DL (ref 15–325)
CREAT UR-MCNC: 399.7 MG/DL (ref 15–325)
DIFFERENTIAL METHOD: ABNORMAL
EOSINOPHIL # BLD AUTO: 0.3 K/UL (ref 0–0.5)
EOSINOPHIL NFR BLD: 2 % (ref 0–8)
ERYTHROCYTE [DISTWIDTH] IN BLOOD BY AUTOMATED COUNT: 16.2 % (ref 11.5–14.5)
EST. GFR  (NO RACE VARIABLE): >60 ML/MIN/1.73 M^2
GLUCOSE SERPL-MCNC: 70 MG/DL (ref 70–110)
GLUCOSE UR QL STRIP: NEGATIVE
HCT VFR BLD AUTO: 34.8 % (ref 37–48.5)
HGB BLD-MCNC: 11.2 G/DL (ref 12–16)
HGB UR QL STRIP: ABNORMAL
HIV 1+2 AB+HIV1 P24 AG SERPL QL IA: NEGATIVE
HIV1+2 IGG SERPL QL IA.RAPID: NEGATIVE
HYALINE CASTS #/AREA URNS LPF: 43 /LPF
IMM GRANULOCYTES # BLD AUTO: 0.05 K/UL (ref 0–0.04)
IMM GRANULOCYTES NFR BLD AUTO: 0.4 % (ref 0–0.5)
KETONES UR QL STRIP: NEGATIVE
LEUKOCYTE ESTERASE UR QL STRIP: ABNORMAL
LYMPHOCYTES # BLD AUTO: 2.4 K/UL (ref 1–4.8)
LYMPHOCYTES NFR BLD: 18.3 % (ref 18–48)
MAGNESIUM SERPL-MCNC: 4.2 MG/DL (ref 1.6–2.6)
MCH RBC QN AUTO: 24.5 PG (ref 27–31)
MCHC RBC AUTO-ENTMCNC: 32.2 G/DL (ref 32–36)
MCV RBC AUTO: 76 FL (ref 82–98)
MICROSCOPIC COMMENT: ABNORMAL
MONOCYTES # BLD AUTO: 0.9 K/UL (ref 0.3–1)
MONOCYTES NFR BLD: 6.9 % (ref 4–15)
NEUTROPHILS # BLD AUTO: 9.2 K/UL (ref 1.8–7.7)
NEUTROPHILS NFR BLD: 72 % (ref 38–73)
NITRITE UR QL STRIP: NEGATIVE
NRBC BLD-RTO: 0 /100 WBC
OPIATES UR QL SCN: NEGATIVE
PCP UR QL SCN>25 NG/ML: NEGATIVE
PH UR STRIP: 7 [PH] (ref 5–8)
PLATELET # BLD AUTO: 294 K/UL (ref 150–450)
PMV BLD AUTO: 10.9 FL (ref 9.2–12.9)
POTASSIUM SERPL-SCNC: 4.1 MMOL/L (ref 3.5–5.1)
PRENATAL STREP B CULTURE: NORMAL
PROT SERPL-MCNC: 5.9 G/DL (ref 6–8.4)
PROT UR QL STRIP: ABNORMAL
RBC # BLD AUTO: 4.58 M/UL (ref 4–5.4)
RBC #/AREA URNS HPF: >100 /HPF (ref 0–4)
SODIUM SERPL-SCNC: 135 MMOL/L (ref 136–145)
SP GR UR STRIP: 1.03 (ref 1–1.03)
SQUAMOUS #/AREA URNS HPF: 9 /HPF
TOXICOLOGY INFORMATION: ABNORMAL
URN SPEC COLLECT METH UR: ABNORMAL
UROBILINOGEN UR STRIP-ACNC: NEGATIVE EU/DL
WBC # BLD AUTO: 12.81 K/UL (ref 3.9–12.7)
WBC #/AREA URNS HPF: 42 /HPF (ref 0–5)

## 2023-11-19 PROCEDURE — 63600175 PHARM REV CODE 636 W HCPCS: Performed by: ANESTHESIOLOGY

## 2023-11-19 PROCEDURE — 36415 COLL VENOUS BLD VENIPUNCTURE: CPT | Performed by: SPECIALIST

## 2023-11-19 PROCEDURE — 80053 COMPREHEN METABOLIC PANEL: CPT | Performed by: SPECIALIST

## 2023-11-19 PROCEDURE — 83735 ASSAY OF MAGNESIUM: CPT | Performed by: SPECIALIST

## 2023-11-19 PROCEDURE — 01968 PR INSERT CATH,ART,PERCUT,SHORTTERM: ICD-10-PCS | Mod: QX,CRNA,, | Performed by: STUDENT IN AN ORGANIZED HEALTH CARE EDUCATION/TRAINING PROGRAM

## 2023-11-19 PROCEDURE — 80307 DRUG TEST PRSMV CHEM ANLYZR: CPT | Performed by: SPECIALIST

## 2023-11-19 PROCEDURE — 80348 DRUG SCREENING BUPRENORPHINE: CPT | Performed by: SPECIALIST

## 2023-11-19 PROCEDURE — 86762 RUBELLA ANTIBODY: CPT | Performed by: SPECIALIST

## 2023-11-19 PROCEDURE — 25000003 PHARM REV CODE 250: Performed by: SPECIALIST

## 2023-11-19 PROCEDURE — 87340 HEPATITIS B SURFACE AG IA: CPT | Performed by: SPECIALIST

## 2023-11-19 PROCEDURE — 59514 CESAREAN DELIVERY ONLY: CPT | Mod: AA,ANES,, | Performed by: ANESTHESIOLOGY

## 2023-11-19 PROCEDURE — 25000003 PHARM REV CODE 250: Performed by: ANESTHESIOLOGY

## 2023-11-19 PROCEDURE — 01968 ANES/ANALG CS DLVR NEURAXIAL: CPT | Mod: QX,CRNA,, | Performed by: STUDENT IN AN ORGANIZED HEALTH CARE EDUCATION/TRAINING PROGRAM

## 2023-11-19 PROCEDURE — 01968 ANES/ANALG CS DLVR NEURAXIAL: CPT | Mod: QY,ANES,, | Performed by: ANESTHESIOLOGY

## 2023-11-19 PROCEDURE — 85025 COMPLETE CBC W/AUTO DIFF WBC: CPT | Performed by: SPECIALIST

## 2023-11-19 PROCEDURE — 86592 SYPHILIS TEST NON-TREP QUAL: CPT | Performed by: SPECIALIST

## 2023-11-19 PROCEDURE — 01968 PR INSERT CATH,ART,PERCUT,SHORTTERM: ICD-10-PCS | Mod: QY,ANES,, | Performed by: ANESTHESIOLOGY

## 2023-11-19 PROCEDURE — 62326 NJX INTERLAMINAR LMBR/SAC: CPT | Performed by: ANESTHESIOLOGY

## 2023-11-19 PROCEDURE — 86901 BLOOD TYPING SEROLOGIC RH(D): CPT | Performed by: SPECIALIST

## 2023-11-19 PROCEDURE — 27200710 HC EPIDURAL INFUSION PUMP SET: Performed by: ANESTHESIOLOGY

## 2023-11-19 PROCEDURE — 63600175 PHARM REV CODE 636 W HCPCS: Performed by: SPECIALIST

## 2023-11-19 PROCEDURE — 59514 CESAREAN DELIVERY ONLY: CPT | Mod: QX,CRNA,, | Performed by: STUDENT IN AN ORGANIZED HEALTH CARE EDUCATION/TRAINING PROGRAM

## 2023-11-19 PROCEDURE — 81001 URINALYSIS AUTO W/SCOPE: CPT | Performed by: SPECIALIST

## 2023-11-19 PROCEDURE — 87086 URINE CULTURE/COLONY COUNT: CPT | Performed by: SPECIALIST

## 2023-11-19 PROCEDURE — 59514 PRA REAN DELIVERY ONLY: ICD-10-PCS | Mod: QX,CRNA,, | Performed by: STUDENT IN AN ORGANIZED HEALTH CARE EDUCATION/TRAINING PROGRAM

## 2023-11-19 PROCEDURE — 86703 HIV-1/HIV-2 1 RESULT ANTBDY: CPT | Performed by: SPECIALIST

## 2023-11-19 PROCEDURE — 12000002 HC ACUTE/MED SURGE SEMI-PRIVATE ROOM

## 2023-11-19 PROCEDURE — 59514 PRA REAN DELIVERY ONLY: ICD-10-PCS | Mod: AA,ANES,, | Performed by: ANESTHESIOLOGY

## 2023-11-19 PROCEDURE — C1751 CATH, INF, PER/CENT/MIDLINE: HCPCS | Performed by: ANESTHESIOLOGY

## 2023-11-19 RX ORDER — TRANEXAMIC ACID 10 MG/ML
1000 INJECTION, SOLUTION INTRAVENOUS EVERY 30 MIN PRN
Status: DISCONTINUED | OUTPATIENT
Start: 2023-11-19 | End: 2023-11-23 | Stop reason: HOSPADM

## 2023-11-19 RX ORDER — OXYTOCIN/RINGER'S LACTATE 30/500 ML
0-30 PLASTIC BAG, INJECTION (ML) INTRAVENOUS CONTINUOUS
Status: DISCONTINUED | OUTPATIENT
Start: 2023-11-19 | End: 2023-11-20

## 2023-11-19 RX ORDER — HYDRALAZINE HYDROCHLORIDE 20 MG/ML
10 INJECTION INTRAMUSCULAR; INTRAVENOUS ONCE AS NEEDED
Status: COMPLETED | OUTPATIENT
Start: 2023-11-19 | End: 2023-11-19

## 2023-11-19 RX ORDER — HYDRALAZINE HYDROCHLORIDE 20 MG/ML
10 INJECTION INTRAMUSCULAR; INTRAVENOUS ONCE AS NEEDED
Status: DISCONTINUED | OUTPATIENT
Start: 2023-11-19 | End: 2023-11-19

## 2023-11-19 RX ORDER — SODIUM CHLORIDE, SODIUM LACTATE, POTASSIUM CHLORIDE, CALCIUM CHLORIDE 600; 310; 30; 20 MG/100ML; MG/100ML; MG/100ML; MG/100ML
INJECTION, SOLUTION INTRAVENOUS CONTINUOUS
Status: DISCONTINUED | OUTPATIENT
Start: 2023-11-19 | End: 2023-11-21

## 2023-11-19 RX ORDER — NALOXONE HCL 0.4 MG/ML
0.4 VIAL (ML) INJECTION SEE ADMIN INSTRUCTIONS
Status: DISCONTINUED | OUTPATIENT
Start: 2023-11-19 | End: 2023-11-20

## 2023-11-19 RX ORDER — OXYTOCIN/RINGER'S LACTATE 30/500 ML
0-30 PLASTIC BAG, INJECTION (ML) INTRAVENOUS CONTINUOUS
Status: DISCONTINUED | OUTPATIENT
Start: 2023-11-19 | End: 2023-11-19

## 2023-11-19 RX ORDER — CALCIUM CARBONATE 200(500)MG
500 TABLET,CHEWABLE ORAL 3 TIMES DAILY PRN
Status: DISCONTINUED | OUTPATIENT
Start: 2023-11-19 | End: 2023-11-23 | Stop reason: HOSPADM

## 2023-11-19 RX ORDER — ROPIVACAINE HYDROCHLORIDE 2 MG/ML
20 INJECTION, SOLUTION EPIDURAL; INFILTRATION ONCE AS NEEDED
Status: DISCONTINUED | OUTPATIENT
Start: 2023-11-19 | End: 2023-11-20

## 2023-11-19 RX ORDER — CARBOPROST TROMETHAMINE 250 UG/ML
250 INJECTION, SOLUTION INTRAMUSCULAR
Status: DISCONTINUED | OUTPATIENT
Start: 2023-11-19 | End: 2023-11-20 | Stop reason: SDUPTHER

## 2023-11-19 RX ORDER — DIPHENOXYLATE HYDROCHLORIDE AND ATROPINE SULFATE 2.5; .025 MG/1; MG/1
2 TABLET ORAL EVERY 6 HOURS PRN
Status: DISCONTINUED | OUTPATIENT
Start: 2023-11-19 | End: 2023-11-20 | Stop reason: SDUPTHER

## 2023-11-19 RX ORDER — ACETAMINOPHEN 500 MG
1000 TABLET ORAL EVERY 6 HOURS PRN
Status: DISCONTINUED | OUTPATIENT
Start: 2023-11-19 | End: 2023-11-23 | Stop reason: HOSPADM

## 2023-11-19 RX ORDER — CLONIDINE 0.1 MG/24H
1 PATCH, EXTENDED RELEASE TRANSDERMAL
Status: DISCONTINUED | OUTPATIENT
Start: 2023-11-19 | End: 2023-11-20

## 2023-11-19 RX ORDER — EPHEDRINE SULFATE 50 MG/ML
10 INJECTION, SOLUTION INTRAVENOUS ONCE AS NEEDED
Status: DISCONTINUED | OUTPATIENT
Start: 2023-11-19 | End: 2023-11-20

## 2023-11-19 RX ORDER — LABETALOL HYDROCHLORIDE 5 MG/ML
40 INJECTION, SOLUTION INTRAVENOUS ONCE AS NEEDED
Status: COMPLETED | OUTPATIENT
Start: 2023-11-19 | End: 2023-11-19

## 2023-11-19 RX ORDER — HYDRALAZINE HYDROCHLORIDE 20 MG/ML
10 INJECTION INTRAMUSCULAR; INTRAVENOUS ONCE
Status: COMPLETED | OUTPATIENT
Start: 2023-11-19 | End: 2023-11-19

## 2023-11-19 RX ORDER — OXYTOCIN/RINGER'S LACTATE 30/500 ML
95 PLASTIC BAG, INJECTION (ML) INTRAVENOUS ONCE AS NEEDED
Status: DISCONTINUED | OUTPATIENT
Start: 2023-11-19 | End: 2023-11-20

## 2023-11-19 RX ORDER — FENTANYL/BUPIVACAINE/NS/PF 2MCG/ML-.1
14 PLASTIC BAG, INJECTION (ML) INJECTION CONTINUOUS
Status: DISCONTINUED | OUTPATIENT
Start: 2023-11-19 | End: 2023-11-20

## 2023-11-19 RX ORDER — MAGNESIUM SULFATE HEPTAHYDRATE 40 MG/ML
4 INJECTION, SOLUTION INTRAVENOUS ONCE
Status: COMPLETED | OUTPATIENT
Start: 2023-11-19 | End: 2023-11-19

## 2023-11-19 RX ORDER — MISOPROSTOL 200 UG/1
800 TABLET ORAL ONCE AS NEEDED
Status: DISCONTINUED | OUTPATIENT
Start: 2023-11-19 | End: 2023-11-20 | Stop reason: SDUPTHER

## 2023-11-19 RX ORDER — SODIUM CHLORIDE, SODIUM LACTATE, POTASSIUM CHLORIDE, CALCIUM CHLORIDE 600; 310; 30; 20 MG/100ML; MG/100ML; MG/100ML; MG/100ML
INJECTION, SOLUTION INTRAVENOUS CONTINUOUS
Status: DISCONTINUED | OUTPATIENT
Start: 2023-11-19 | End: 2023-11-20

## 2023-11-19 RX ORDER — ONDANSETRON 2 MG/ML
4 INJECTION INTRAMUSCULAR; INTRAVENOUS EVERY 6 HOURS PRN
Status: DISCONTINUED | OUTPATIENT
Start: 2023-11-19 | End: 2023-11-20

## 2023-11-19 RX ORDER — LIDOCAINE HYDROCHLORIDE 10 MG/ML
10 INJECTION INFILTRATION; PERINEURAL ONCE AS NEEDED
Status: DISCONTINUED | OUTPATIENT
Start: 2023-11-19 | End: 2023-11-20

## 2023-11-19 RX ORDER — CALCIUM GLUCONATE 98 MG/ML
1 INJECTION, SOLUTION INTRAVENOUS
Status: DISCONTINUED | OUTPATIENT
Start: 2023-11-19 | End: 2023-11-23 | Stop reason: HOSPADM

## 2023-11-19 RX ORDER — CLONIDINE 0.1 MG/24H
1 PATCH, EXTENDED RELEASE TRANSDERMAL
Status: DISCONTINUED | OUTPATIENT
Start: 2023-11-20 | End: 2023-11-19

## 2023-11-19 RX ORDER — SODIUM CHLORIDE 9 MG/ML
INJECTION, SOLUTION INTRAVENOUS
Status: DISCONTINUED | OUTPATIENT
Start: 2023-11-19 | End: 2023-11-20

## 2023-11-19 RX ORDER — HYDRALAZINE HYDROCHLORIDE 20 MG/ML
5 INJECTION INTRAMUSCULAR; INTRAVENOUS ONCE AS NEEDED
Status: DISCONTINUED | OUTPATIENT
Start: 2023-11-19 | End: 2023-11-19

## 2023-11-19 RX ORDER — HYDRALAZINE HYDROCHLORIDE 20 MG/ML
5 INJECTION INTRAMUSCULAR; INTRAVENOUS ONCE AS NEEDED
Status: COMPLETED | OUTPATIENT
Start: 2023-11-19 | End: 2023-11-19

## 2023-11-19 RX ORDER — NALBUPHINE HYDROCHLORIDE 10 MG/ML
5 INJECTION, SOLUTION INTRAMUSCULAR; INTRAVENOUS; SUBCUTANEOUS
Status: DISCONTINUED | OUTPATIENT
Start: 2023-11-19 | End: 2023-11-20

## 2023-11-19 RX ORDER — PROCHLORPERAZINE EDISYLATE 5 MG/ML
5 INJECTION INTRAMUSCULAR; INTRAVENOUS EVERY 6 HOURS PRN
Status: DISCONTINUED | OUTPATIENT
Start: 2023-11-19 | End: 2023-11-20

## 2023-11-19 RX ORDER — LABETALOL HYDROCHLORIDE 5 MG/ML
80 INJECTION, SOLUTION INTRAVENOUS ONCE AS NEEDED
Status: COMPLETED | OUTPATIENT
Start: 2023-11-19 | End: 2023-11-19

## 2023-11-19 RX ORDER — FENTANYL/BUPIVACAINE/NS/PF 2MCG/ML-.1
PLASTIC BAG, INJECTION (ML) INJECTION CONTINUOUS
Status: DISCONTINUED | OUTPATIENT
Start: 2023-11-19 | End: 2023-11-20

## 2023-11-19 RX ORDER — ROPIVACAINE HYDROCHLORIDE 2 MG/ML
INJECTION, SOLUTION EPIDURAL; INFILTRATION
Status: DISCONTINUED | OUTPATIENT
Start: 2023-11-19 | End: 2023-11-19

## 2023-11-19 RX ORDER — OXYTOCIN/RINGER'S LACTATE 30/500 ML
334 PLASTIC BAG, INJECTION (ML) INTRAVENOUS ONCE AS NEEDED
Status: DISCONTINUED | OUTPATIENT
Start: 2023-11-19 | End: 2023-11-20

## 2023-11-19 RX ORDER — OXYTOCIN/RINGER'S LACTATE 30/500 ML
334 PLASTIC BAG, INJECTION (ML) INTRAVENOUS ONCE
Status: DISCONTINUED | OUTPATIENT
Start: 2023-11-19 | End: 2023-11-20

## 2023-11-19 RX ORDER — METHYLERGONOVINE MALEATE 0.2 MG/ML
200 INJECTION INTRAVENOUS ONCE AS NEEDED
Status: DISCONTINUED | OUTPATIENT
Start: 2023-11-19 | End: 2023-11-20

## 2023-11-19 RX ORDER — DIPHENHYDRAMINE HYDROCHLORIDE 50 MG/ML
12.5 INJECTION INTRAMUSCULAR; INTRAVENOUS EVERY 4 HOURS PRN
Status: DISCONTINUED | OUTPATIENT
Start: 2023-11-19 | End: 2023-11-20

## 2023-11-19 RX ORDER — MAGNESIUM SULFATE HEPTAHYDRATE 40 MG/ML
2 INJECTION, SOLUTION INTRAVENOUS CONTINUOUS
Status: DISCONTINUED | OUTPATIENT
Start: 2023-11-19 | End: 2023-11-20

## 2023-11-19 RX ORDER — ROPIVACAINE HYDROCHLORIDE 2 MG/ML
INJECTION, SOLUTION EPIDURAL; INFILTRATION
Status: DISCONTINUED | OUTPATIENT
Start: 2023-11-19 | End: 2023-11-20

## 2023-11-19 RX ORDER — LABETALOL HYDROCHLORIDE 5 MG/ML
20 INJECTION, SOLUTION INTRAVENOUS ONCE AS NEEDED
Status: COMPLETED | OUTPATIENT
Start: 2023-11-19 | End: 2023-11-19

## 2023-11-19 RX ORDER — HYDRALAZINE HYDROCHLORIDE 20 MG/ML
10 INJECTION INTRAMUSCULAR; INTRAVENOUS ONCE
Status: DISCONTINUED | OUTPATIENT
Start: 2023-11-20 | End: 2023-11-19

## 2023-11-19 RX ORDER — OXYTOCIN 10 [USP'U]/ML
10 INJECTION, SOLUTION INTRAMUSCULAR; INTRAVENOUS ONCE AS NEEDED
Status: DISCONTINUED | OUTPATIENT
Start: 2023-11-19 | End: 2023-11-23 | Stop reason: HOSPADM

## 2023-11-19 RX ADMIN — ROPIVACAINE HYDROCHLORIDE 4 ML: 2 INJECTION, SOLUTION EPIDURAL; INFILTRATION at 07:11

## 2023-11-19 RX ADMIN — SODIUM CHLORIDE, SODIUM LACTATE, POTASSIUM CHLORIDE, AND CALCIUM CHLORIDE: .6; .31; .03; .02 INJECTION, SOLUTION INTRAVENOUS at 07:11

## 2023-11-19 RX ADMIN — LABETALOL HYDROCHLORIDE 20 MG: 5 INJECTION, SOLUTION INTRAVENOUS at 05:11

## 2023-11-19 RX ADMIN — HYDRALAZINE HYDROCHLORIDE 10 MG: 20 INJECTION INTRAMUSCULAR; INTRAVENOUS at 11:11

## 2023-11-19 RX ADMIN — Medication 12 ML/HR: at 07:11

## 2023-11-19 RX ADMIN — Medication 2 MILLI-UNITS/MIN: at 05:11

## 2023-11-19 RX ADMIN — MAGNESIUM SULFATE HEPTAHYDRATE 4 G: 40 INJECTION, SOLUTION INTRAVENOUS at 06:11

## 2023-11-19 RX ADMIN — HYDRALAZINE HYDROCHLORIDE 10 MG: 20 INJECTION INTRAMUSCULAR; INTRAVENOUS at 05:11

## 2023-11-19 RX ADMIN — HYDRALAZINE HYDROCHLORIDE 5 MG: 20 INJECTION INTRAMUSCULAR; INTRAVENOUS at 04:11

## 2023-11-19 RX ADMIN — MAGNESIUM SULFATE IN WATER 2 G/HR: 40 INJECTION, SOLUTION INTRAVENOUS at 06:11

## 2023-11-19 RX ADMIN — SODIUM CHLORIDE, SODIUM LACTATE, POTASSIUM CHLORIDE, AND CALCIUM CHLORIDE 500 ML: .6; .31; .03; .02 INJECTION, SOLUTION INTRAVENOUS at 06:11

## 2023-11-19 RX ADMIN — NALBUPHINE HYDROCHLORIDE 5 MG: 10 INJECTION, SOLUTION INTRAMUSCULAR; INTRAVENOUS; SUBCUTANEOUS at 05:11

## 2023-11-19 RX ADMIN — DEXTROSE 3 MILLION UNITS: 50 INJECTION, SOLUTION INTRAVENOUS at 09:11

## 2023-11-19 RX ADMIN — LABETALOL HYDROCHLORIDE 80 MG: 5 INJECTION, SOLUTION INTRAVENOUS at 10:11

## 2023-11-19 RX ADMIN — SODIUM CHLORIDE, SODIUM LACTATE, POTASSIUM CHLORIDE, AND CALCIUM CHLORIDE: .6; .31; .03; .02 INJECTION, SOLUTION INTRAVENOUS at 04:11

## 2023-11-19 RX ADMIN — LABETALOL HYDROCHLORIDE 40 MG: 5 INJECTION, SOLUTION INTRAVENOUS at 08:11

## 2023-11-19 RX ADMIN — LABETALOL HYDROCHLORIDE 40 MG: 5 INJECTION, SOLUTION INTRAVENOUS at 05:11

## 2023-11-19 RX ADMIN — DEXTROSE MONOHYDRATE 5 MILLION UNITS: 5 INJECTION INTRAVENOUS at 05:11

## 2023-11-19 RX ADMIN — PROMETHAZINE HYDROCHLORIDE 12.5 MG: 25 INJECTION INTRAMUSCULAR; INTRAVENOUS at 10:11

## 2023-11-20 LAB
BASOPHILS # BLD AUTO: 0.03 K/UL (ref 0–0.2)
BASOPHILS # BLD AUTO: 0.03 K/UL (ref 0–0.2)
BASOPHILS NFR BLD: 0.2 % (ref 0–1.9)
BASOPHILS NFR BLD: 0.2 % (ref 0–1.9)
DIFFERENTIAL METHOD: ABNORMAL
DIFFERENTIAL METHOD: ABNORMAL
EOSINOPHIL # BLD AUTO: 0 K/UL (ref 0–0.5)
EOSINOPHIL # BLD AUTO: 0 K/UL (ref 0–0.5)
EOSINOPHIL NFR BLD: 0 % (ref 0–8)
EOSINOPHIL NFR BLD: 0.1 % (ref 0–8)
ERYTHROCYTE [DISTWIDTH] IN BLOOD BY AUTOMATED COUNT: 16.4 % (ref 11.5–14.5)
ERYTHROCYTE [DISTWIDTH] IN BLOOD BY AUTOMATED COUNT: 16.6 % (ref 11.5–14.5)
HCT VFR BLD AUTO: 33.7 % (ref 37–48.5)
HCT VFR BLD AUTO: 35.4 % (ref 37–48.5)
HGB BLD-MCNC: 11.1 G/DL (ref 12–16)
HGB BLD-MCNC: 11.4 G/DL (ref 12–16)
IMM GRANULOCYTES # BLD AUTO: 0.09 K/UL (ref 0–0.04)
IMM GRANULOCYTES # BLD AUTO: 0.15 K/UL (ref 0–0.04)
IMM GRANULOCYTES NFR BLD AUTO: 0.5 % (ref 0–0.5)
IMM GRANULOCYTES NFR BLD AUTO: 0.8 % (ref 0–0.5)
LYMPHOCYTES # BLD AUTO: 1.4 K/UL (ref 1–4.8)
LYMPHOCYTES # BLD AUTO: 1.5 K/UL (ref 1–4.8)
LYMPHOCYTES NFR BLD: 7.7 % (ref 18–48)
LYMPHOCYTES NFR BLD: 7.8 % (ref 18–48)
MAGNESIUM SERPL-MCNC: 4.4 MG/DL (ref 1.6–2.6)
MAGNESIUM SERPL-MCNC: 4.4 MG/DL (ref 1.6–2.6)
MAGNESIUM SERPL-MCNC: 5.2 MG/DL (ref 1.6–2.6)
MAGNESIUM SERPL-MCNC: 5.6 MG/DL (ref 1.6–2.6)
MCH RBC QN AUTO: 24.3 PG (ref 27–31)
MCH RBC QN AUTO: 24.9 PG (ref 27–31)
MCHC RBC AUTO-ENTMCNC: 32.2 G/DL (ref 32–36)
MCHC RBC AUTO-ENTMCNC: 32.9 G/DL (ref 32–36)
MCV RBC AUTO: 75 FL (ref 82–98)
MCV RBC AUTO: 76 FL (ref 82–98)
MONOCYTES # BLD AUTO: 0.8 K/UL (ref 0.3–1)
MONOCYTES # BLD AUTO: 1.2 K/UL (ref 0.3–1)
MONOCYTES NFR BLD: 4.2 % (ref 4–15)
MONOCYTES NFR BLD: 6.4 % (ref 4–15)
NEUTROPHILS # BLD AUTO: 15.9 K/UL (ref 1.8–7.7)
NEUTROPHILS # BLD AUTO: 16.3 K/UL (ref 1.8–7.7)
NEUTROPHILS NFR BLD: 85.1 % (ref 38–73)
NEUTROPHILS NFR BLD: 87 % (ref 38–73)
NRBC BLD-RTO: 0 /100 WBC
NRBC BLD-RTO: 0 /100 WBC
PLATELET # BLD AUTO: 295 K/UL (ref 150–450)
PLATELET # BLD AUTO: 324 K/UL (ref 150–450)
PMV BLD AUTO: 10.8 FL (ref 9.2–12.9)
PMV BLD AUTO: 11 FL (ref 9.2–12.9)
RBC # BLD AUTO: 4.46 M/UL (ref 4–5.4)
RBC # BLD AUTO: 4.7 M/UL (ref 4–5.4)
RPR SER QL: NORMAL
WBC # BLD AUTO: 18.23 K/UL (ref 3.9–12.7)
WBC # BLD AUTO: 19.1 K/UL (ref 3.9–12.7)

## 2023-11-20 PROCEDURE — 25000003 PHARM REV CODE 250: Performed by: STUDENT IN AN ORGANIZED HEALTH CARE EDUCATION/TRAINING PROGRAM

## 2023-11-20 PROCEDURE — 63600175 PHARM REV CODE 636 W HCPCS: Performed by: ANESTHESIOLOGY

## 2023-11-20 PROCEDURE — 63600175 PHARM REV CODE 636 W HCPCS: Performed by: SPECIALIST

## 2023-11-20 PROCEDURE — 83735 ASSAY OF MAGNESIUM: CPT | Mod: 91 | Performed by: SPECIALIST

## 2023-11-20 PROCEDURE — 71000033 HC RECOVERY, INTIAL HOUR: Performed by: SPECIALIST

## 2023-11-20 PROCEDURE — 25000003 PHARM REV CODE 250: Performed by: ANESTHESIOLOGY

## 2023-11-20 PROCEDURE — 36000685 HC CESAREAN SECTION LEVEL I

## 2023-11-20 PROCEDURE — 63600175 PHARM REV CODE 636 W HCPCS: Performed by: STUDENT IN AN ORGANIZED HEALTH CARE EDUCATION/TRAINING PROGRAM

## 2023-11-20 PROCEDURE — 51702 INSERT TEMP BLADDER CATH: CPT

## 2023-11-20 PROCEDURE — 37000009 HC ANESTHESIA EA ADD 15 MINS: Performed by: SPECIALIST

## 2023-11-20 PROCEDURE — 71000039 HC RECOVERY, EACH ADD'L HOUR: Performed by: SPECIALIST

## 2023-11-20 PROCEDURE — 25000003 PHARM REV CODE 250: Performed by: SPECIALIST

## 2023-11-20 PROCEDURE — 85025 COMPLETE CBC W/AUTO DIFF WBC: CPT | Performed by: SPECIALIST

## 2023-11-20 PROCEDURE — 37000008 HC ANESTHESIA 1ST 15 MINUTES: Performed by: SPECIALIST

## 2023-11-20 PROCEDURE — 25000003 PHARM REV CODE 250: Performed by: HOSPITALIST

## 2023-11-20 PROCEDURE — 63600175 PHARM REV CODE 636 W HCPCS: Performed by: HOSPITALIST

## 2023-11-20 PROCEDURE — C9290 INJ, BUPIVACAINE LIPOSOME: HCPCS | Performed by: SPECIALIST

## 2023-11-20 PROCEDURE — 21000000 HC CCU ICU ROOM CHARGE

## 2023-11-20 PROCEDURE — 36415 COLL VENOUS BLD VENIPUNCTURE: CPT | Performed by: SPECIALIST

## 2023-11-20 RX ORDER — CEFAZOLIN SODIUM 2 G/50ML
2 SOLUTION INTRAVENOUS ONCE
Status: COMPLETED | OUTPATIENT
Start: 2023-11-20 | End: 2023-11-20

## 2023-11-20 RX ORDER — FENTANYL CITRATE 50 UG/ML
INJECTION, SOLUTION INTRAMUSCULAR; INTRAVENOUS
Status: DISCONTINUED | OUTPATIENT
Start: 2023-11-20 | End: 2023-11-20

## 2023-11-20 RX ORDER — LABETALOL HYDROCHLORIDE 5 MG/ML
40 INJECTION, SOLUTION INTRAVENOUS ONCE
Status: COMPLETED | OUTPATIENT
Start: 2023-11-20 | End: 2023-11-20

## 2023-11-20 RX ORDER — PROCHLORPERAZINE EDISYLATE 5 MG/ML
5 INJECTION INTRAMUSCULAR; INTRAVENOUS EVERY 6 HOURS PRN
Status: DISCONTINUED | OUTPATIENT
Start: 2023-11-20 | End: 2023-11-20

## 2023-11-20 RX ORDER — OXYTOCIN/RINGER'S LACTATE 30/500 ML
95 PLASTIC BAG, INJECTION (ML) INTRAVENOUS ONCE AS NEEDED
Status: DISCONTINUED | OUTPATIENT
Start: 2023-11-20 | End: 2023-11-23 | Stop reason: HOSPADM

## 2023-11-20 RX ORDER — ONDANSETRON 2 MG/ML
8 INJECTION INTRAMUSCULAR; INTRAVENOUS EVERY 8 HOURS PRN
Status: DISCONTINUED | OUTPATIENT
Start: 2023-11-20 | End: 2023-11-23 | Stop reason: HOSPADM

## 2023-11-20 RX ORDER — AMOXICILLIN 250 MG
1 CAPSULE ORAL NIGHTLY PRN
Status: DISCONTINUED | OUTPATIENT
Start: 2023-11-20 | End: 2023-11-23 | Stop reason: HOSPADM

## 2023-11-20 RX ORDER — NALBUPHINE HYDROCHLORIDE 10 MG/ML
5 INJECTION, SOLUTION INTRAMUSCULAR; INTRAVENOUS; SUBCUTANEOUS EVERY 4 HOURS PRN
Status: DISCONTINUED | OUTPATIENT
Start: 2023-11-20 | End: 2023-11-20

## 2023-11-20 RX ORDER — MAGNESIUM SULFATE HEPTAHYDRATE 40 MG/ML
1 INJECTION, SOLUTION INTRAVENOUS CONTINUOUS
Status: DISCONTINUED | OUTPATIENT
Start: 2023-11-20 | End: 2023-11-21

## 2023-11-20 RX ORDER — MISOPROSTOL 200 UG/1
800 TABLET ORAL ONCE AS NEEDED
Status: COMPLETED | OUTPATIENT
Start: 2023-11-20 | End: 2023-11-20

## 2023-11-20 RX ORDER — OXYCODONE HYDROCHLORIDE 5 MG/1
5 TABLET ORAL
Status: DISCONTINUED | OUTPATIENT
Start: 2023-11-20 | End: 2023-11-20 | Stop reason: HOSPADM

## 2023-11-20 RX ORDER — ACETAMINOPHEN 10 MG/ML
INJECTION, SOLUTION INTRAVENOUS
Status: DISCONTINUED | OUTPATIENT
Start: 2023-11-20 | End: 2023-11-20

## 2023-11-20 RX ORDER — MORPHINE SULFATE 0.5 MG/ML
INJECTION, SOLUTION EPIDURAL; INTRATHECAL; INTRAVENOUS
Status: DISCONTINUED | OUTPATIENT
Start: 2023-11-20 | End: 2023-11-20

## 2023-11-20 RX ORDER — OXYCODONE AND ACETAMINOPHEN 10; 325 MG/1; MG/1
1 TABLET ORAL EVERY 4 HOURS PRN
Status: DISCONTINUED | OUTPATIENT
Start: 2023-11-20 | End: 2023-11-23 | Stop reason: HOSPADM

## 2023-11-20 RX ORDER — HYDRALAZINE HYDROCHLORIDE 25 MG/1
25 TABLET, FILM COATED ORAL EVERY 8 HOURS
Status: DISCONTINUED | OUTPATIENT
Start: 2023-11-20 | End: 2023-11-21

## 2023-11-20 RX ORDER — MISOPROSTOL 200 UG/1
200 TABLET ORAL ONCE
Status: COMPLETED | OUTPATIENT
Start: 2023-11-20 | End: 2023-11-20

## 2023-11-20 RX ORDER — MISOPROSTOL 200 UG/1
200 TABLET ORAL 4 TIMES DAILY
Status: DISPENSED | OUTPATIENT
Start: 2023-11-20 | End: 2023-11-21

## 2023-11-20 RX ORDER — DIPHENHYDRAMINE HYDROCHLORIDE 50 MG/ML
12.5 INJECTION INTRAMUSCULAR; INTRAVENOUS
Status: DISCONTINUED | OUTPATIENT
Start: 2023-11-20 | End: 2023-11-20 | Stop reason: HOSPADM

## 2023-11-20 RX ORDER — OXYTOCIN-SODIUM CHLORIDE 0.9% IV SOLN 30 UNIT/500ML 30-0.9/5 UT/ML-%
SOLUTION INTRAVENOUS
Status: DISCONTINUED | OUTPATIENT
Start: 2023-11-20 | End: 2023-11-20

## 2023-11-20 RX ORDER — NIFEDIPINE 30 MG/1
30 TABLET, EXTENDED RELEASE ORAL DAILY
Status: DISCONTINUED | OUTPATIENT
Start: 2023-11-20 | End: 2023-11-23 | Stop reason: HOSPADM

## 2023-11-20 RX ORDER — OXYTOCIN/RINGER'S LACTATE 30/500 ML
334 PLASTIC BAG, INJECTION (ML) INTRAVENOUS ONCE AS NEEDED
Status: DISCONTINUED | OUTPATIENT
Start: 2023-11-20 | End: 2023-11-23 | Stop reason: HOSPADM

## 2023-11-20 RX ORDER — DIPHENHYDRAMINE HYDROCHLORIDE 50 MG/ML
25 INJECTION INTRAMUSCULAR; INTRAVENOUS EVERY 4 HOURS PRN
Status: ACTIVE | OUTPATIENT
Start: 2023-11-20 | End: 2023-11-23

## 2023-11-20 RX ORDER — TRIPROLIDINE/PSEUDOEPHEDRINE 2.5MG-60MG
600 TABLET ORAL EVERY 6 HOURS PRN
Status: DISCONTINUED | OUTPATIENT
Start: 2023-11-20 | End: 2023-11-22

## 2023-11-20 RX ORDER — MIDAZOLAM HYDROCHLORIDE 1 MG/ML
INJECTION INTRAMUSCULAR; INTRAVENOUS
Status: DISCONTINUED | OUTPATIENT
Start: 2023-11-20 | End: 2023-11-20

## 2023-11-20 RX ORDER — ONDANSETRON 4 MG/1
8 TABLET, ORALLY DISINTEGRATING ORAL EVERY 8 HOURS PRN
Status: DISCONTINUED | OUTPATIENT
Start: 2023-11-20 | End: 2023-11-23 | Stop reason: HOSPADM

## 2023-11-20 RX ORDER — SODIUM CHLORIDE, SODIUM LACTATE, POTASSIUM CHLORIDE, CALCIUM CHLORIDE 600; 310; 30; 20 MG/100ML; MG/100ML; MG/100ML; MG/100ML
INJECTION, SOLUTION INTRAVENOUS CONTINUOUS PRN
Status: DISCONTINUED | OUTPATIENT
Start: 2023-11-20 | End: 2023-11-20

## 2023-11-20 RX ORDER — HYDROMORPHONE HYDROCHLORIDE 1 MG/ML
0.2 INJECTION, SOLUTION INTRAMUSCULAR; INTRAVENOUS; SUBCUTANEOUS EVERY 5 MIN PRN
Status: DISCONTINUED | OUTPATIENT
Start: 2023-11-20 | End: 2023-11-20 | Stop reason: HOSPADM

## 2023-11-20 RX ORDER — OXYTOCIN/RINGER'S LACTATE 30/500 ML
95 PLASTIC BAG, INJECTION (ML) INTRAVENOUS ONCE
Status: DISCONTINUED | OUTPATIENT
Start: 2023-11-20 | End: 2023-11-23 | Stop reason: HOSPADM

## 2023-11-20 RX ORDER — ACETAMINOPHEN 10 MG/ML
1000 INJECTION, SOLUTION INTRAVENOUS ONCE
Status: COMPLETED | OUTPATIENT
Start: 2023-11-20 | End: 2023-11-20

## 2023-11-20 RX ORDER — ONDANSETRON 2 MG/ML
4 INJECTION INTRAMUSCULAR; INTRAVENOUS EVERY 6 HOURS PRN
Status: DISCONTINUED | OUTPATIENT
Start: 2023-11-20 | End: 2023-11-20

## 2023-11-20 RX ORDER — ONDANSETRON 2 MG/ML
INJECTION INTRAMUSCULAR; INTRAVENOUS
Status: DISCONTINUED | OUTPATIENT
Start: 2023-11-20 | End: 2023-11-20

## 2023-11-20 RX ORDER — CEFAZOLIN SODIUM 2 G/50ML
2 SOLUTION INTRAVENOUS
Status: DISCONTINUED | OUTPATIENT
Start: 2023-11-20 | End: 2023-11-22

## 2023-11-20 RX ORDER — DIPHENOXYLATE HYDROCHLORIDE AND ATROPINE SULFATE 2.5; .025 MG/1; MG/1
2 TABLET ORAL EVERY 6 HOURS PRN
Status: DISCONTINUED | OUTPATIENT
Start: 2023-11-20 | End: 2023-11-23 | Stop reason: HOSPADM

## 2023-11-20 RX ORDER — TRANEXAMIC ACID 10 MG/ML
1000 INJECTION, SOLUTION INTRAVENOUS EVERY 30 MIN PRN
Status: DISCONTINUED | OUTPATIENT
Start: 2023-11-20 | End: 2023-11-23 | Stop reason: HOSPADM

## 2023-11-20 RX ORDER — HYDROMORPHONE HYDROCHLORIDE 1 MG/ML
1.5 INJECTION, SOLUTION INTRAMUSCULAR; INTRAVENOUS; SUBCUTANEOUS EVERY 4 HOURS PRN
Status: DISCONTINUED | OUTPATIENT
Start: 2023-11-20 | End: 2023-11-20

## 2023-11-20 RX ORDER — DOCUSATE SODIUM 100 MG/1
200 CAPSULE, LIQUID FILLED ORAL 2 TIMES DAILY
Status: DISCONTINUED | OUTPATIENT
Start: 2023-11-20 | End: 2023-11-23 | Stop reason: HOSPADM

## 2023-11-20 RX ORDER — HYDROMORPHONE HYDROCHLORIDE 1 MG/ML
0.5 INJECTION, SOLUTION INTRAMUSCULAR; INTRAVENOUS; SUBCUTANEOUS EVERY 4 HOURS PRN
Status: DISCONTINUED | OUTPATIENT
Start: 2023-11-20 | End: 2023-11-23 | Stop reason: HOSPADM

## 2023-11-20 RX ORDER — CARBOPROST TROMETHAMINE 250 UG/ML
250 INJECTION, SOLUTION INTRAMUSCULAR
Status: DISCONTINUED | OUTPATIENT
Start: 2023-11-20 | End: 2023-11-23 | Stop reason: HOSPADM

## 2023-11-20 RX ORDER — OXYCODONE HYDROCHLORIDE 5 MG/1
10 TABLET ORAL EVERY 4 HOURS PRN
Status: DISCONTINUED | OUTPATIENT
Start: 2023-11-20 | End: 2023-11-21 | Stop reason: ALTCHOICE

## 2023-11-20 RX ORDER — HYDRALAZINE HYDROCHLORIDE 20 MG/ML
INJECTION INTRAMUSCULAR; INTRAVENOUS
Status: DISCONTINUED | OUTPATIENT
Start: 2023-11-20 | End: 2023-11-20

## 2023-11-20 RX ORDER — NICARDIPINE HYDROCHLORIDE 0.2 MG/ML
0-15 INJECTION INTRAVENOUS CONTINUOUS
Status: DISCONTINUED | OUTPATIENT
Start: 2023-11-20 | End: 2023-11-21

## 2023-11-20 RX ORDER — NIFEDIPINE 10 MG/1
10 CAPSULE ORAL EVERY 30 MIN PRN
Status: DISCONTINUED | OUTPATIENT
Start: 2023-11-20 | End: 2023-11-23 | Stop reason: HOSPADM

## 2023-11-20 RX ORDER — OXYTOCIN 10 [USP'U]/ML
10 INJECTION, SOLUTION INTRAMUSCULAR; INTRAVENOUS ONCE AS NEEDED
Status: COMPLETED | OUTPATIENT
Start: 2023-11-20 | End: 2023-11-20

## 2023-11-20 RX ORDER — ONDANSETRON 2 MG/ML
4 INJECTION INTRAMUSCULAR; INTRAVENOUS DAILY PRN
Status: DISCONTINUED | OUTPATIENT
Start: 2023-11-20 | End: 2023-11-20 | Stop reason: HOSPADM

## 2023-11-20 RX ORDER — ROCURONIUM BROMIDE 10 MG/ML
INJECTION, SOLUTION INTRAVENOUS
Status: DISCONTINUED | OUTPATIENT
Start: 2023-11-20 | End: 2023-11-20

## 2023-11-20 RX ORDER — MISOPROSTOL 200 UG/1
800 TABLET ORAL ONCE AS NEEDED
Status: DISCONTINUED | OUTPATIENT
Start: 2023-11-20 | End: 2023-11-23 | Stop reason: HOSPADM

## 2023-11-20 RX ORDER — CEFAZOLIN SODIUM 1 G/3ML
INJECTION, POWDER, FOR SOLUTION INTRAMUSCULAR; INTRAVENOUS
Status: DISCONTINUED | OUTPATIENT
Start: 2023-11-20 | End: 2023-11-20

## 2023-11-20 RX ORDER — MUPIROCIN 20 MG/G
OINTMENT TOPICAL 2 TIMES DAILY
Status: DISCONTINUED | OUTPATIENT
Start: 2023-11-20 | End: 2023-11-23 | Stop reason: HOSPADM

## 2023-11-20 RX ORDER — PROPOFOL 10 MG/ML
VIAL (ML) INTRAVENOUS
Status: DISCONTINUED | OUTPATIENT
Start: 2023-11-20 | End: 2023-11-20

## 2023-11-20 RX ORDER — SUCCINYLCHOLINE CHLORIDE 20 MG/ML
INJECTION INTRAMUSCULAR; INTRAVENOUS
Status: DISCONTINUED | OUTPATIENT
Start: 2023-11-20 | End: 2023-11-20

## 2023-11-20 RX ORDER — BUPIVACAINE HYDROCHLORIDE 5 MG/ML
24 INJECTION, SOLUTION EPIDURAL; INTRACAUDAL ONCE
Status: COMPLETED | OUTPATIENT
Start: 2023-11-20 | End: 2023-11-20

## 2023-11-20 RX ORDER — PHENYLEPHRINE HYDROCHLORIDE 10 MG/ML
INJECTION INTRAVENOUS
Status: DISCONTINUED | OUTPATIENT
Start: 2023-11-20 | End: 2023-11-20

## 2023-11-20 RX ORDER — LIDOCAINE HYDROCHLORIDE 20 MG/ML
INJECTION, SOLUTION EPIDURAL; INFILTRATION; INTRACAUDAL; PERINEURAL
Status: DISCONTINUED | OUTPATIENT
Start: 2023-11-20 | End: 2023-11-20

## 2023-11-20 RX ORDER — ACETAMINOPHEN 10 MG/ML
1000 INJECTION, SOLUTION INTRAVENOUS ONCE
Status: DISCONTINUED | OUTPATIENT
Start: 2023-11-20 | End: 2023-11-20

## 2023-11-20 RX ORDER — SIMETHICONE 80 MG
1 TABLET,CHEWABLE ORAL EVERY 6 HOURS PRN
Status: DISCONTINUED | OUTPATIENT
Start: 2023-11-20 | End: 2023-11-23 | Stop reason: HOSPADM

## 2023-11-20 RX ORDER — HYDRALAZINE HYDROCHLORIDE 20 MG/ML
10 INJECTION INTRAMUSCULAR; INTRAVENOUS ONCE
Status: COMPLETED | OUTPATIENT
Start: 2023-11-20 | End: 2023-11-20

## 2023-11-20 RX ORDER — OXYCODONE AND ACETAMINOPHEN 5; 325 MG/1; MG/1
1 TABLET ORAL EVERY 4 HOURS PRN
Status: DISCONTINUED | OUTPATIENT
Start: 2023-11-21 | End: 2023-11-23 | Stop reason: HOSPADM

## 2023-11-20 RX ADMIN — HYDRALAZINE HYDROCHLORIDE 10 MG: 20 INJECTION, SOLUTION INTRAMUSCULAR; INTRAVENOUS at 09:11

## 2023-11-20 RX ADMIN — MISOPROSTOL 200 MCG: 200 TABLET ORAL at 10:11

## 2023-11-20 RX ADMIN — PHENYLEPHRINE HYDROCHLORIDE 300 MCG: 10 INJECTION INTRAVENOUS at 07:11

## 2023-11-20 RX ADMIN — CARBOPROST TROMETHAMINE 250 MCG: 250 INJECTION, SOLUTION INTRAMUSCULAR at 07:11

## 2023-11-20 RX ADMIN — ROCURONIUM BROMIDE 45 MG: 10 INJECTION, SOLUTION INTRAVENOUS at 08:11

## 2023-11-20 RX ADMIN — NIFEDIPINE 10 MG: 10 CAPSULE ORAL at 06:11

## 2023-11-20 RX ADMIN — DOCUSATE SODIUM 200 MG: 100 CAPSULE, LIQUID FILLED ORAL at 08:11

## 2023-11-20 RX ADMIN — PHENYLEPHRINE HYDROCHLORIDE 400 MCG: 10 INJECTION INTRAVENOUS at 07:11

## 2023-11-20 RX ADMIN — HYDROMORPHONE HYDROCHLORIDE 0.5 MG: 1 INJECTION, SOLUTION INTRAMUSCULAR; INTRAVENOUS; SUBCUTANEOUS at 08:11

## 2023-11-20 RX ADMIN — SODIUM CHLORIDE, SODIUM LACTATE, POTASSIUM CHLORIDE, AND CALCIUM CHLORIDE: .6; .31; .03; .02 INJECTION, SOLUTION INTRAVENOUS at 10:11

## 2023-11-20 RX ADMIN — FENTANYL CITRATE 100 MCG: 50 INJECTION, SOLUTION INTRAMUSCULAR; INTRAVENOUS at 08:11

## 2023-11-20 RX ADMIN — MISOPROSTOL 200 MCG: 200 TABLET ORAL at 05:11

## 2023-11-20 RX ADMIN — CLONIDINE 1 PATCH: 0.1 PATCH TRANSDERMAL at 02:11

## 2023-11-20 RX ADMIN — ONDANSETRON 4 MG: 2 INJECTION INTRAMUSCULAR; INTRAVENOUS at 08:11

## 2023-11-20 RX ADMIN — OXYTOCIN 10 UNITS: 10 INJECTION, SOLUTION INTRAMUSCULAR; INTRAVENOUS at 08:11

## 2023-11-20 RX ADMIN — DIPHENOXYLATE HYDROCHLORIDE AND ATROPINE SULFATE 2 TABLET: 2.5; .025 TABLET ORAL at 10:11

## 2023-11-20 RX ADMIN — NICARDIPINE HYDROCHLORIDE 2.5 MG/HR: 0.2 INJECTION, SOLUTION INTRAVENOUS at 07:11

## 2023-11-20 RX ADMIN — FENTANYL CITRATE 100 MCG: 0.05 INJECTION, SOLUTION INTRAMUSCULAR; INTRAVENOUS at 07:11

## 2023-11-20 RX ADMIN — CEFAZOLIN 2 G: 330 INJECTION, POWDER, FOR SOLUTION INTRAMUSCULAR; INTRAVENOUS at 07:11

## 2023-11-20 RX ADMIN — HYDROMORPHONE HYDROCHLORIDE 0.5 MG: 1 INJECTION, SOLUTION INTRAMUSCULAR; INTRAVENOUS; SUBCUTANEOUS at 10:11

## 2023-11-20 RX ADMIN — SUGAMMADEX 400 MG: 100 INJECTION, SOLUTION INTRAVENOUS at 09:11

## 2023-11-20 RX ADMIN — HYDRALAZINE HYDROCHLORIDE 25 MG: 25 TABLET ORAL at 05:11

## 2023-11-20 RX ADMIN — NIFEDIPINE 30 MG: 30 TABLET, FILM COATED, EXTENDED RELEASE ORAL at 11:11

## 2023-11-20 RX ADMIN — CEFAZOLIN SODIUM 2 G: 2 SOLUTION INTRAVENOUS at 07:11

## 2023-11-20 RX ADMIN — MISOPROSTOL 600 MCG: 200 TABLET ORAL at 07:11

## 2023-11-20 RX ADMIN — LIDOCAINE HYDROCHLORIDE 5 ML: 20 INJECTION, SOLUTION EPIDURAL; INFILTRATION; INTRACAUDAL; PERINEURAL at 07:11

## 2023-11-20 RX ADMIN — DEXTROSE 3 MILLION UNITS: 50 INJECTION, SOLUTION INTRAVENOUS at 06:11

## 2023-11-20 RX ADMIN — HYDROMORPHONE HYDROCHLORIDE 0.2 MG: 1 INJECTION, SOLUTION INTRAMUSCULAR; INTRAVENOUS; SUBCUTANEOUS at 11:11

## 2023-11-20 RX ADMIN — Medication 30 UNITS: at 08:11

## 2023-11-20 RX ADMIN — LIDOCAINE HYDROCHLORIDE 10 ML: 20 INJECTION, SOLUTION EPIDURAL; INFILTRATION; INTRACAUDAL; PERINEURAL at 07:11

## 2023-11-20 RX ADMIN — SODIUM CHLORIDE, SODIUM LACTATE, POTASSIUM CHLORIDE, AND CALCIUM CHLORIDE: .6; .31; .03; .02 INJECTION, SOLUTION INTRAVENOUS at 09:11

## 2023-11-20 RX ADMIN — CEFAZOLIN SODIUM 2 G: 2 SOLUTION INTRAVENOUS at 10:11

## 2023-11-20 RX ADMIN — LABETALOL HYDROCHLORIDE 40 MG: 5 INJECTION INTRAVENOUS at 05:11

## 2023-11-20 RX ADMIN — MORPHINE SULFATE 2.5 MG: 0.5 INJECTION, SOLUTION EPIDURAL; INTRATHECAL; INTRAVENOUS at 08:11

## 2023-11-20 RX ADMIN — SODIUM CHLORIDE, SODIUM LACTATE, POTASSIUM CHLORIDE, AND CALCIUM CHLORIDE 1000 ML: .6; .31; .03; .02 INJECTION, SOLUTION INTRAVENOUS at 07:11

## 2023-11-20 RX ADMIN — SODIUM CHLORIDE, SODIUM LACTATE, POTASSIUM CHLORIDE, AND CALCIUM CHLORIDE: .6; .31; .03; .02 INJECTION, SOLUTION INTRAVENOUS at 05:11

## 2023-11-20 RX ADMIN — SODIUM CHLORIDE, SODIUM LACTATE, POTASSIUM CHLORIDE, AND CALCIUM CHLORIDE: .6; .31; .03; .02 INJECTION, SOLUTION INTRAVENOUS at 07:11

## 2023-11-20 RX ADMIN — Medication 200 MG: at 07:11

## 2023-11-20 RX ADMIN — DEXTROSE 3 MILLION UNITS: 50 INJECTION, SOLUTION INTRAVENOUS at 01:11

## 2023-11-20 RX ADMIN — AZITHROMYCIN DIHYDRATE 500 MG: 500 INJECTION, POWDER, LYOPHILIZED, FOR SOLUTION INTRAVENOUS at 07:11

## 2023-11-20 RX ADMIN — MAGNESIUM SULFATE IN WATER 1 G/HR: 40 INJECTION, SOLUTION INTRAVENOUS at 10:11

## 2023-11-20 RX ADMIN — ONDANSETRON 8 MG: 2 INJECTION INTRAMUSCULAR; INTRAVENOUS at 05:11

## 2023-11-20 RX ADMIN — PROPOFOL 150 MG: 10 INJECTION, EMULSION INTRAVENOUS at 07:11

## 2023-11-20 RX ADMIN — HYDRALAZINE HYDROCHLORIDE 25 MG: 25 TABLET ORAL at 10:11

## 2023-11-20 RX ADMIN — CEFAZOLIN SODIUM 2 G: 2 SOLUTION INTRAVENOUS at 03:11

## 2023-11-20 RX ADMIN — HYDRALAZINE HYDROCHLORIDE 10 MG: 20 INJECTION INTRAMUSCULAR; INTRAVENOUS at 03:11

## 2023-11-20 RX ADMIN — ACETAMINOPHEN 1000 MG: 10 INJECTION, SOLUTION INTRAVENOUS at 07:11

## 2023-11-20 RX ADMIN — Medication 30 UNITS: at 07:11

## 2023-11-20 RX ADMIN — ROCURONIUM BROMIDE 5 MG: 10 INJECTION, SOLUTION INTRAVENOUS at 07:11

## 2023-11-20 RX ADMIN — MIDAZOLAM HYDROCHLORIDE 2 MG: 1 INJECTION, SOLUTION INTRAMUSCULAR; INTRAVENOUS at 07:11

## 2023-11-20 RX ADMIN — PHENYLEPHRINE HYDROCHLORIDE 200 MCG: 10 INJECTION INTRAVENOUS at 07:11

## 2023-11-20 NOTE — SUBJECTIVE & OBJECTIVE
Obstetric HPI:  23-year-old  1 at 39 weeks 1 day gestation with a due date of 2023 which is consistent with an ultrasound that I see in the system at 16 weeks' gestation.  She presents complaining of vaginal spotting.  Upon admission patient was found to have very elevated blood pressures.  Being that she was term gestation we began to evaluate and manage this with hydralazine and hypertension protocol.  Patient was not having any pain complaints on admission but once we admitted her she started to have tightening pain and requested IV pain medicine.  Patient denies any other medical problems besides history of chronic hypertension.  Patient has not seen the doctor in over 6 weeks.      This pregnancy has been complicated by chronic hypertension    OB History    Para Term  AB Living   1 0 0 0 0 0   SAB IAB Ectopic Multiple Live Births   0 0 0 0 0      # Outcome Date GA Lbr Zack/2nd Weight Sex Delivery Anes PTL Lv   1 Current              Past Medical History:   Diagnosis Date    Hypertension      History reviewed. No pertinent surgical history.    PTA Medications   Medication Sig    prenatal vit/iron fum/folic ac (PRENATAL 1+1 ORAL) Take 1 tablet by mouth once daily.    acetaminophen (TYLENOL) 650 MG TbSR Take 1 tablet (650 mg total) by mouth every 8 (eight) hours.    dexchlorphen-phenylephrine-DM (POLYTUSSIN DM) 1-5-10 mg/5 mL Syrp Take 10 mLs by mouth every 4 (four) hours as needed.    fluticasone propionate (FLONASE) 50 mcg/actuation nasal spray 1 spray (50 mcg total) by Each Nostril route once daily.    ibuprofen (ADVIL,MOTRIN) 600 MG tablet Take 1 tablet (600 mg total) by mouth every 6 (six) hours as needed for Pain.    ondansetron (ZOFRAN) 4 MG tablet Take 1 tablet (4 mg total) by mouth every 6 (six) hours.    promethazine (PHENERGAN) 25 MG tablet Take 1 tablet (25 mg total) by mouth every 6 (six) hours as needed for Nausea.       Review of patient's allergies indicates:  No Known  Allergies     Family History       Problem Relation (Age of Onset)    Diabetes Maternal Grandmother, Paternal Grandmother    Hypertension Maternal Grandmother, Paternal Grandmother, Paternal Grandfather    No Known Problems Mother    Seizures Father          Tobacco Use    Smoking status: Never    Smokeless tobacco: Not on file   Substance and Sexual Activity    Alcohol use: No    Drug use: No    Sexual activity: Never     Review of Systems   Objective:     Vital Signs (Most Recent):  Temp: 97.4 °F (36.3 °C) (11/19/23 1543)  Pulse: 94 (11/19/23 1726)  Resp: 20 (11/19/23 1726)  BP: (!) 173/93 (11/19/23 1726)  SpO2: 99 % (11/19/23 1543) Vital Signs (24h Range):  Temp:  [97.4 °F (36.3 °C)] 97.4 °F (36.3 °C)  Pulse:  [52-94] 94  Resp:  [18-20] 20  SpO2:  [99 %] 99 %  BP: (152-192)/() 173/93     Weight: 102.1 kg (225 lb)  Body mass index is 42.51 kg/m².    FHT:  140s with good long-term variability and accelerations.  Currently variability slightly decreased with Nubain IV. Cat 1 (reassuring)  TOCO:  Some contractions and irritability noted     Physical Exam  Patient appears uncomfortable not very talkative   Denies headache or visual changes  Patient is status post 3 doses of hydralazine and 2 doses of labetalol at this point.  We are starting magnesium sulfate prophylaxis as well.  Estimated fetal weight 7 lb 12 oz by my Leopold's.  Ultrasound estimated 7 lb with a normal JOSE LUIS.  Extremities trace edema 2+ DTRs     Cervix:  Dilation:  2  Effacement:  75%  Station: -3  Presentation: Vertex     Significant Labs:  Lab Results   Component Value Date    GROUPTRH A POS 11/19/2023   UA demonstrates 2+ protein 3+ blood    CBC:   Recent Labs   Lab 11/19/23  1700   WBC 12.81*   RBC 4.58   HGB 11.2*   HCT 34.8*      MCV 76*   MCH 24.5*   MCHC 32.2     CMP:   Recent Labs   Lab 11/19/23  1700   GLU 70   CALCIUM 8.9   ALBUMIN 3.3*   PROT 5.9*   *   K 4.1   CO2 19*      BUN 8   CREATININE 0.9   ALKPHOS 145*  "  ALT 12   AST 20   BILITOT 0.3     HIV: No results for input(s): "[id number]" in the last 48 hours.  I have personallly reviewed all pertinent lab results from the last 24 hours.  "

## 2023-11-20 NOTE — ANESTHESIA PREPROCEDURE EVALUATION
11/19/2023  Zoran Dubose is a 23 y.o., female.      Patient Active Problem List   Diagnosis    Elevated BP without diagnosis of hypertension    Overweight    Gestational hypertension, third trimester       History reviewed. No pertinent surgical history.     Tobacco Use:  The patient  reports that she has never smoked. She does not have any smokeless tobacco history on file.     No results found for this or any previous visit.          Lab Results   Component Value Date    WBC 12.81 (H) 11/19/2023    HGB 11.2 (L) 11/19/2023    HCT 34.8 (L) 11/19/2023    MCV 76 (L) 11/19/2023     11/19/2023     BMP  Lab Results   Component Value Date     (L) 11/19/2023    K 4.1 11/19/2023     11/19/2023    CO2 19 (L) 11/19/2023    BUN 8 11/19/2023    CREATININE 0.9 11/19/2023    CALCIUM 8.9 11/19/2023    ANIONGAP 9 11/19/2023    GLU 70 11/19/2023     06/11/2023    GLU 85 03/27/2023       No results found for this or any previous visit.              Pre-op Assessment    I have reviewed the Patient Summary Reports.     I have reviewed the Nursing Notes. I have reviewed the NPO Status.   I have reviewed the Medications.     Review of Systems  Anesthesia Hx:  No problems with previous Anesthesia             Denies Family Hx of Anesthesia complications.    Denies Personal Hx of Anesthesia complications.                    Social:  Non-Smoker       Hematology/Oncology:  Hematology Normal                                     Cardiovascular:     Hypertension, poorly controlled                                        Pulmonary:  Pulmonary Normal                       Hepatic/GI:  Hepatic/GI Normal                 Musculoskeletal:  Musculoskeletal Normal                Neurological:  Neurology Normal                                      Endocrine:  Endocrine Normal          Morbid Obesity / BMI > 40      Physical  Exam  General: Well nourished, Cooperative, Alert and Oriented    Airway:  Mallampati: III / II  Mouth Opening: Normal  TM Distance: Normal  Tongue: Normal  Neck ROM: Normal ROM    Dental:  Intact    Chest/Lungs:  Clear to auscultation    Heart:  Rate: Normal  Rhythm: Regular Rhythm  Sounds: Normal    Abdomen:  Normal, Soft, Nontender        Anesthesia Plan  Type of Anesthesia, risks & benefits discussed:    Anesthesia Type: Epidural  Intra-op Monitoring Plan: Standard ASA Monitors  Post Op Pain Control Plan: epidural analgesia  Informed Consent: Informed consent signed with the Patient and all parties understand the risks and agree with anesthesia plan.  All questions answered.   ASA Score: 3 Emergent  Anesthesia Plan Notes:   LE  PCEA    Ready For Surgery From Anesthesia Perspective.     .

## 2023-11-20 NOTE — SUBJECTIVE & OBJECTIVE
Interval History:  Zoran is a 23 y.o.  at 39w2d.  Remains comfortable with epidural after a re-dose.  Patient has had labile blood pressures continuing to increase quite shortly after labetalol or hydralazine wear off.  She is not quite maxed out on labetalol yet.  Seems to be responding better to hydralazine and just received another 10 mg of hydralazine.  I considered a clonidine patch but then removed it realizing that it does not work for 2-3 days.  We could consider clonidine orally if needed.    Objective:     Vital Signs (Most Recent):  Temp: 97.8 °F (36.6 °C) (23)  Pulse: 81 (23 033)  Resp: 19 (23)  BP: 134/72 (23)  SpO2: 99 % (23 1543) Vital Signs (24h Range):  Temp:  [97.4 °F (36.3 °C)-97.8 °F (36.6 °C)] 97.8 °F (36.6 °C)  Pulse:  [52-94] 81  Resp:  [18-20] 19  SpO2:  [99 %] 99 %  BP: (133-197)/() 134/72     Weight: 102.1 kg (225 lb)  Body mass index is 42.51 kg/m².    FHT:  140s with decreased variability on magnesium.  No decelerations occasional 10 beat per minute accelerations has been noted Cat 2 (reassuring)  TOCO:  Q 1-2 minutes    Cervical Exam:  Dilation:  7  Effacement:  100%  Station: -1  Presentation: Vertex   Moderate caput but there does feel to be space and fetus has moved down and cervix is dilating nicely.  IUPC placed as we have had trouble really picking up contractions.  Pitocin is at 8  Significant Labs:  Lab Results   Component Value Date    GROUPTRH A POS 2023    STREPBCULT unknown 2023       I have personallly reviewed all pertinent lab results from the last 24 hours.    Physical Exam    Review of Systems

## 2023-11-20 NOTE — L&D DELIVERY NOTE
[facility]   Section   Operative Note    SUMMARY     Date of Procedure: 2023     Procedure: Procedure(s) (LRB):   SECTION (N/A)    Surgeon(s) and Role:     * Marya Spain MD - Primary    Assisting Surgeon:Preethi Walden MD    Pre-Operative Diagnosis: IUP at 39 w2d; Hypertensive crisis/ Severe Preeclampsia;     Post-Operative Diagnosis: Same    Anesthesia: Epidural    Technical Procedures Used: Primary Low Transverse  Section           Description of the Findings of the Procedure:   The risks, benefits, indication, and alternatives of the procedure were discussed with the patient and informed consent was obtained.  She was taken to the operating room where spinal anesthesia was found to be adequate. She was prepped and draped in the usual sterile fashion.  A Leigh catheter was inserted. SCD hose were placed and she received antibiotic prophylaxis before any incision was made.      The Leigh catheter had been placed while patient was in labor with epidural.  The urine was extremely concentrated and slightly blood-tinged prior to starting the case.    A Pfannenstiel skin incision was made with the knife, and was carried down to the fascia. The fascial incision was excised transversely, and then both   both superiorly and inferiorly off of the muscle. The muscle was  in the midline and the peritoneum was identified and entered bluntly. The peritoneal incision was extended superiorly and inferiorly with good visualization of bladder. The bladder blade was placed and vesicouterine peritoneum was incised in the midline. This incision was extended laterally and the bladder flap was created manually and with sharp dissection.     The uterine incision was started with the knife, and this incision was extended laterally and bluntly also. The membranes were ruptured upon entry and is clear. The bladder blade was removed and the infant was int the cephalic  presentation.  The head was delivered, the nares and mouth were suctioned, then the shoulders cleared easily, nuchal cord x2 loose reduced; followed by the rest of the body without difficulty. It is a viable male infant.  The cord was clamped and cut, and the infant was handed off to  nurse. The uterus was exteriorized and the placenta was completely removed, then the uterus was exteriorized and wiped clean.  Secondary to the patient being on magnesium and in labor for greater than 12 hours went ahead and had the nurse place 600 of Cytotec rectally.  The uterus was quite boggy but did not have severe bleeding.  10 units of Pitocin were injected into the uterine musculature as well as 25 mcg of Hemabate. The uterine incision was repaired with  0 Maxon. In a running locking fashion.  Small areas of bleeding cauterized with electrocautery and required 2-0 chromic sutures for hemostasis.  The posterior aspect of the uterus was irrigated.  Both tubes and ovaries are inspected and appear normal.    The uterus was still quite boggy.  I made the decision to go ahead and place the B Cohn stitch with 0 chromic.  This had a very nice result keeping the uterus quite small.  Appeared much more firm    The uterus was returned into the abdomen, and the pelvis was irrigated copiously with warm normal saline. The uterine incision and lower uterine segment were again checked for hemostasis, fibrillar and Frank was placed over the area for added hemostasis.  All instruments and lap were removed from the abdomen and pelvis, and counts are correct.     The peritoneum was closed with a 2-0 Vicryl in a running fashion. The rectus muscles are hemostatic, and closed with a U stitch of 2-0 Maxon.  The fascia was closed with 0 Maxon in a running fashion. The subcuticular fat was irrigated with normal saline and hemostasis was achieved using the Bovie. Clovis's fascia was reapproximated using 2-0 Vicryl in a running fashion. The skin  was closed using 4-0 Monocryl in a subcuticular fashion and a Mepilex dressing was placed over the incision. The patient tolerated procedure well. Sponge lap needle and instrument counts were correct ×2. She was taken recovery in stable condition. The fernandez continues to drain clear urine.     EBL: 550 ml      Complications: No       Specimens: None  Specimen (24h ago, onward)      None        Apgars for the baby were 1 5 and 9.  Cord gas was pH of 7.28 base excess of -7. HCO3 19 and baby weight 7:13.    Condition: Good    Disposition: PACU - hemodynamically stable.    Attestation: Good         Delivery Information for Ignacio Dubose    Birth information:  YOB: 2022   Time of birth: 7:50 AM   Sex: male   Head Delivery Date/Time: 2023  7:50 AM   Delivery type: , Low Transverse   Gestational Age: 39w2d        Delivery Providers    Delivering clinician: Marya Spain MD   Provider Role    Fernanda Gresham RN Circulator    Fatimah Russo RN Registered Nurse    Danita Pham RN Registered Nurse    Saravanan Boston MD Anesthesiologist    Chelita Mckee NNP Nurse Practitioner    Belen Lee RN Pediatric Nursery    [facility]  Surgical Tech              Measurements    Weight:   Length:          Apgars    Living status:   Apgar Component Scores:  1 min.:  5 min.:  10 min.:  15 min.:  20 min.:    Skin color:         Heart rate:         Reflex irritability:         Muscle tone:         Respiratory effort:         Total:                  Operative Delivery    Forceps attempted?: No  Vacuum extractor attempted?: No         Shoulder Dystocia    Shoulder dystocia present?: No           Presentation    Presentation: Vertex  Position: Occiput Anterior           Interventions/Resuscitation           Cord    No data filed       Placenta    Placenta delivery date/time:   Placenta removal:            Labor Events:       labor: No     Labor Onset Date/Time:          Dilation Complete Date/Time:         Start Pushing Date/Time:         Start Pushing Date/Time:       Rupture Date/Time: 23         Rupture type: ARM (Artificial Rupture)         Fluid Amount:       Fluid Color: Clear               steroids: None     Antibiotics given for GBS: Yes     Induction: oxytocin     Indications for induction:  Hypertension     Augmentation:       Indications for augmentation:       Labor complications:       Additional complications:          Cervical ripening:                     Delivery:      Episiotomy:       Indication for Episiotomy:       Perineal Lacerations:   Repaired:      Periurethral Laceration:   Repaired:     Labial Laceration:   Repaired:     Sulcus Laceration:   Repaired:     Vaginal Laceration:   Repaired:     Cervical Laceration:   Repaired:     Repair suture:       Repair # of packets:       Last Value - EBL - Nursing (mL):       Sum - EBL - Nursing (mL): 0     Last Value - EBL - Anesthesia (mL):      Calculated QBL (mL):       Vaginal Sweep Performed:       Surgicount Correct:         Other providers:       Anesthesia    Method: General, Epidural          Details (if applicable):  Trial of Labor Yes    Categorization: Primary    Priority: Urgent   Indications for : Other (Add Comments)   Incision Type: low transverse     Additional  information:  Forceps:    Vacuum:    Breech:    Observed anomalies    Other (Comments):

## 2023-11-20 NOTE — PROGRESS NOTES
[facility]  Obstetrics  Labor Progress Note    Patient Name: Zoran Dubose  MRN: [id number]  Admission Date: 2023  Hospital Length of Stay: 1 days  Attending Physician: Marya Spain MD  Primary Care Provider: June Zaragoza MD    Subjective:     Principal Problem:Gestational hypertension, third trimester    Hospital Course:  No notes on file    Interval History:  Zoran is a 23 y.o.  at 39w2d.  Remains comfortable with epidural after a re-dose.  Patient has had labile blood pressures continuing to increase quite shortly after labetalol or hydralazine wear off.  She is not quite maxed out on labetalol yet.  Seems to be responding better to hydralazine and just received another 10 mg of hydralazine.  I considered a clonidine patch but then removed it realizing that it does not work for 2-3 days.  We could consider clonidine orally if needed.    Objective:     Vital Signs (Most Recent):  Temp: 97.8 °F (36.6 °C) (23)  Pulse: 81 (23 0332)  Resp: 19 (23 0100)  BP: 134/72 (23 033)  SpO2: 99 % (23 1543) Vital Signs (24h Range):  Temp:  [97.4 °F (36.3 °C)-97.8 °F (36.6 °C)] 97.8 °F (36.6 °C)  Pulse:  [52-94] 81  Resp:  [18-20] 19  SpO2:  [99 %] 99 %  BP: (133-197)/() 134/72     Weight: 102.1 kg (225 lb)  Body mass index is 42.51 kg/m².    FHT:  140s with decreased variability on magnesium.  No decelerations occasional 10 beat per minute accelerations has been noted Cat 2 (reassuring)  TOCO:  Q 1-2 minutes    Cervical Exam:  Dilation:  7  Effacement:  100%  Station: -1  Presentation: Vertex   Moderate caput but there does feel to be space and fetus has moved down and cervix is dilating nicely.  IUPC placed as we have had trouble really picking up contractions.  Pitocin is at 8  Significant Labs:  Lab Results   Component Value Date    GROUPTRH A POS 2023    STREPBCULT unknown 2023       I have personallly reviewed all pertinent lab  results from the last 24 hours.    Physical Exam    Review of Systems  Assessment/Plan:     23 y.o. female  at 39w2d for:    * Gestational hypertension, third trimester  Intrauterine pregnancy at 39 weeks 1 day gestation next time preeclampsia with elevated blood pressures and proteinuria.  Severe at this point.  Magnesium prophylaxis was started.  Patient may be in early labor on her own.  Pitocin augmentation has been started.  Cervix is favorable.  Patient plans to get an epidural.  Unknown group B strep so penicillin is going.  Once patient stabilizes with blood pressures I will either perform an amniotomy or let her get her epidural depending on how her pain is with her contractions.  CBC and CMP are within normal limits no evidence of thrombocytopenia or elevated liver functions.  Creatinine is also normal.  Proteinuria is present at 2+ on urinalysis.  Pelvis feels adequate for vaginal birth    Patient comfortable with epidural.  Amniotomy performed clear fluid.  Anticipate labor progress.  Magnesium running at 2 g an hour.  Urine output at this point looks excellent.  Penicillin is on board.    Progressing in labor.  On magnesium for prophylaxis for seizures.  Mag level is being drawn right now.  Urine output is starting to decrease a bit.  Has been 100 over the last 2 hours.  Appears very concentrated.  Patient received hydralazine 10 mg for this last elevated blood pressure and that worked well with a blood pressure now of 137/72.  Discussed more of the history.  Patient has had history of chronic hypertension which was diagnosed in her teenage years.  She is never been given a known reason for this.  She denies any history of renal artery stenosis.  And was not on blood pressure medicines in the pregnancy.  Reports early pregnancy blood pressures were systolics in the 140s.          Marya Spain MD  Obstetrics  [facility]

## 2023-11-20 NOTE — PROGRESS NOTES
[facility]  Obstetrics  Labor Progress Note    Patient Name: Zoran Dubose  MRN: [id number]  Admission Date: 2023  Hospital Length of Stay: 0 days  Attending Physician: Marya Spain MD  Primary Care Provider: June Zaragoza MD    Subjective:     Principal Problem:Gestational hypertension, third trimester    Hospital Course:  No notes on file    Interval History:  Zoran is a 23 y.o.  at 39w1d. She is doing well.  Comfortable now with epidural    Objective:     Vital Signs (Most Recent):  Temp: 97.4 °F (36.3 °C) (23)  Pulse: 78 (23)  Resp: 20 (23)  BP: (!) 146/85 (23)  SpO2: 99 % (23) Vital Signs (24h Range):  Temp:  [97.4 °F (36.3 °C)] 97.4 °F (36.3 °C)  Pulse:  [52-94] 78  Resp:  [18-20] 20  SpO2:  [99 %] 99 %  BP: (146-192)/() 146/85     Weight: 102.1 kg (225 lb)  Body mass index is 42.51 kg/m².    FHT:  140s with fair variability.  Slight decreased with magnesium.  Patient just received epidural.  Occasional variable type decelerations.  And early decelerations noted once amniotomy performed.  Cat 2 (reassuring)  TOCO:  irreg minutes    Cervical Exam:  Dilation:  3  Effacement:  80%  Station: -2  Presentation: Vertex     Significant Labs:  Lab Results   Component Value Date    GROUPTRH A POS 2023    STREPBCULT unknown 2023       I have personallly reviewed all pertinent lab results from the last 24 hours.    Physical Exam    Review of Systems  Assessment/Plan:     23 y.o. female  at 39w1d for:    * Gestational hypertension, third trimester  Intrauterine pregnancy at 39 weeks 1 day gestation next time preeclampsia with elevated blood pressures and proteinuria.  Severe at this point.  Magnesium prophylaxis was started.  Patient may be in early labor on her own.  Pitocin augmentation has been started.  Cervix is favorable.  Patient plans to get an epidural.  Unknown group B strep so penicillin is going.   Once patient stabilizes with blood pressures I will either perform an amniotomy or let her get her epidural depending on how her pain is with her contractions.  CBC and CMP are within normal limits no evidence of thrombocytopenia or elevated liver functions.  Creatinine is also normal.  Proteinuria is present at 2+ on urinalysis.  Pelvis feels adequate for vaginal birth    Patient comfortable with epidural.  Amniotomy performed clear fluid.  Anticipate labor progress.  Magnesium running at 2 g an hour.  Urine output at this point looks excellent.  Penicillin is on board.          Marya Spain MD  Obstetrics  [facility]

## 2023-11-20 NOTE — ASSESSMENT & PLAN NOTE
Intrauterine pregnancy at 39 weeks 1 day gestation next time preeclampsia with elevated blood pressures and proteinuria.  Severe at this point.  Magnesium prophylaxis was started.  Patient may be in early labor on her own.  Pitocin augmentation has been started.  Cervix is favorable.  Patient plans to get an epidural.  Unknown group B strep so penicillin is going.  Once patient stabilizes with blood pressures I will either perform an amniotomy or let her get her epidural depending on how her pain is with her contractions.  CBC and CMP are within normal limits no evidence of thrombocytopenia or elevated liver functions.  Creatinine is also normal.  Proteinuria is present at 2+ on urinalysis.  Pelvis feels adequate for vaginal birth

## 2023-11-20 NOTE — PLAN OF CARE
[facility]  Discharge Assessment    Primary Care Provider: June Zaragoza MD   OB Screen completed and no needs identified at this time.  White board in room updated with contact information, and mother was encouraged to contact office if further needs arise.    OB Screen (most recent)       OB Screen - 23 1544          OB SCREEN    Assessment Type Discharge Planning Assessment     Source of Information patient     Received Prenatal Care Yes     Is this a teen pregnancy No     Is the baby in NICU Yes     Indication for adoption/[facility] No     Indication for DME/post-acute needs No     HIV (+) No     Any congenital  disorders No     Fetal demise/ death No

## 2023-11-20 NOTE — NURSING
Dr. Spain consulted [facility] for severe range pressures. New orders received for procardia 10mg q30 min up to 30 mg.

## 2023-11-20 NOTE — SUBJECTIVE & OBJECTIVE
Interval History:  Zoran is a 23 y.o.  at 39w2d.  Resting comfortably no complaints except for the continued nausea vomiting.  Patient received 8 mg of Zofran about 15 minutes ago.    Objective:     Vital Signs (Most Recent):  Temp: 98.7 °F (37.1 °C) (23 2300)  Pulse: 78 (23 0517)  Resp: 18 (23 0300)  BP: (!) 179/92 (23 0517)  SpO2: 99 % (23 1543) Vital Signs (24h Range):  Temp:  [97.4 °F (36.3 °C)-98.7 °F (37.1 °C)] 98.7 °F (37.1 °C)  Pulse:  [52-94] 78  Resp:  [18-20] 18  SpO2:  [99 %] 99 %  BP: (133-197)/() 179/92     Weight: 102.1 kg (225 lb)  Body mass index is 42.51 kg/m².    FHT:  120s to 130s with decreased long-term variability on magnesium.  Positive areas of small accelerations about 10 beats per minute occasionally.  No decelerations Cat 2 (reassuring)  TOCO:  Q2-4 min    Cervical Exam:  Dilation:  8  Effacement:  100%  Station: 0  Presentation: Vertex     Significant Labs:  Lab Results   Component Value Date    GROUPTRH A POS 2023    STREPBCULT unknown 2023       I have personallly reviewed all pertinent lab results from the last 24 hours.    Physical Exam    Review of Systems

## 2023-11-20 NOTE — SUBJECTIVE & OBJECTIVE
Interval History:  Postoperative check    Patient is resting comfortable with Dilaudid.  Blood pressures remain labile and rapidly starting to elevate again.  Last blood pressure was 170/100.  Objective:     Vital Signs (Most Recent):  Temp: 98 °F (36.7 °C) (11/20/23 1209)  Pulse: 76 (11/20/23 1446)  Resp: 18 (11/20/23 1433)  BP: (!) 155/88 (11/20/23 1433)  SpO2: 96 % (11/20/23 1446) Vital Signs (24h Range):  Temp:  [97.8 °F (36.6 °C)-98.7 °F (37.1 °C)] 98 °F (36.7 °C)  Pulse:  [59-94] 76  Resp:  [16-20] 18  SpO2:  [95 %-99 %] 96 %  BP: (133-197)/() 155/88     Weight: 102.1 kg (225 lb)  Body mass index is 42.51 kg/m².      Intake/Output Summary (Last 24 hours) at 11/20/2023 1625  Last data filed at 11/20/2023 1422  Gross per 24 hour   Intake 2709.59 ml   Output 4950 ml   Net -2240.41 ml         Significant Labs:  Lab Results   Component Value Date    GROUPTRH A POS 11/19/2023    STREPBCULT unknown 11/19/2023     Recent Labs   Lab 11/20/23  0725   HGB 11.4*   HCT 35.4*   Mag level was 4.4.    I have personallly reviewed all pertinent lab results from the last 24 hours.  And magnesium and A CBC are due around 5 this evening.  Lochia is appropriate   Fundus is firm per the RN.  DTRs are still brisk at 4.4.  Urine output is excellent.  Diuresing tremendously.    Review of Systems

## 2023-11-20 NOTE — ASSESSMENT & PLAN NOTE
Intrauterine pregnancy at 39 weeks 1 day gestation next time preeclampsia with elevated blood pressures and proteinuria.  Severe at this point.  Magnesium prophylaxis was started.  Patient may be in early labor on her own.  Pitocin augmentation has been started.  Cervix is favorable.  Patient plans to get an epidural.  Unknown group B strep so penicillin is going.  Once patient stabilizes with blood pressures I will either perform an amniotomy or let her get her epidural depending on how her pain is with her contractions.  CBC and CMP are within normal limits no evidence of thrombocytopenia or elevated liver functions.  Creatinine is also normal.  Proteinuria is present at 2+ on urinalysis.  Pelvis feels adequate for vaginal birth    Patient comfortable with epidural.  Amniotomy performed clear fluid.  Anticipate labor progress.  Magnesium running at 2 g an hour.  Urine output at this point looks excellent.  Penicillin is on board.    Progressing in labor.  On magnesium for prophylaxis for seizures.  Mag level is being drawn right now.  Urine output is starting to decrease a bit.  Has been 100 over the last 2 hours.  Appears very concentrated.  Patient received hydralazine 10 mg for this last elevated blood pressure and that worked well with a blood pressure now of 137/72.  Discussed more of the history.  Patient has had history of chronic hypertension which was diagnosed in her teenage years.  She is never been given a known reason for this.  She denies any history of renal artery stenosis.  And was not on blood pressure medicines in the pregnancy.  Reports early pregnancy blood pressures were systolics in the 140s.    Severe hypertensive.  I consulted Maternal-Fetal Medicine all sure.  Dr. Willy lopez.  Discussed the patient's care plan.  He is now recommending Procardia 10 mg orally every 30 minutes up to 30 mg.  We have discussed that the patient is already on magnesium and he feels that benefits outweigh the  risks.  I have discussed the patient remains 8 cm and my concern that she will need a  section.  We have discussed if this does not work would proceed with  section secondary to uncontrolled hypertensive crisis and protracted labor process at this point.  Repeating a stat CBC now.  Last Mag level was 5.6.  Magnesium turned down to 1 gram/hour.  Urine output approximately 50 an hour.    Patient received 10 mg of Procardia p.o..  30 minutes later her blood pressure is actually highest that it has been systolic 190 diastolic 107.  Will proceed with  section.  Spoke with patient understands risks especially hemorrhage with maneuvers and hysterectomy.  We will see what her blood pressures do post delivery and see if she needs to go to the ICU for any sort of blood pressure management    Postoperative check.  Patient will be transferred to either ICU step-down hospitalist has been consulted as blood pressures are trending up.  Patient is still on magnesium and we plan to discontinue that at 5:00 a.m..  Hospitalist is going to consider a drip option or others.

## 2023-11-20 NOTE — PROGRESS NOTES
[facility]  Obstetrics  Labor Progress Note    Patient Name: Zoran Dubose  MRN: [id number]  Admission Date: 2023  Hospital Length of Stay: 1 days  Attending Physician: Marya Spain MD  Primary Care Provider: June Zaragoza MD    Subjective:     Principal Problem:Gestational hypertension, third trimester    Hospital Course:  No notes on file    Interval History:  Zoran is a 23 y.o.  at 39w2d.  Resting comfortably no complaints except for the continued nausea vomiting.  Patient received 8 mg of Zofran about 15 minutes ago.    Objective:     Vital Signs (Most Recent):  Temp: 98.7 °F (37.1 °C) (23 2300)  Pulse: 78 (23 0517)  Resp: 18 (23 0300)  BP: (!) 179/92 (23 0517)  SpO2: 99 % (23 1543) Vital Signs (24h Range):  Temp:  [97.4 °F (36.3 °C)-98.7 °F (37.1 °C)] 98.7 °F (37.1 °C)  Pulse:  [52-94] 78  Resp:  [18-20] 18  SpO2:  [99 %] 99 %  BP: (133-197)/() 179/92     Weight: 102.1 kg (225 lb)  Body mass index is 42.51 kg/m².    FHT:  120s to 130s with decreased long-term variability on magnesium.  Positive areas of small accelerations about 10 beats per minute occasionally.  No decelerations Cat 2 (reassuring)  TOCO:  Q2-4 min    Cervical Exam:  Dilation:  8  Effacement:  100%  Station: 0  Presentation: Vertex     Significant Labs:  Lab Results   Component Value Date    GROUPTRH A POS 2023    STREPBCULT unknown 2023       I have personallly reviewed all pertinent lab results from the last 24 hours.    Physical Exam    Review of Systems  Assessment/Plan:     23 y.o. female  at 39w2d for:    * Gestational hypertension, third trimester  Intrauterine pregnancy at 39 weeks 1 day gestation next time preeclampsia with elevated blood pressures and proteinuria.  Severe at this point.  Magnesium prophylaxis was started.  Patient may be in early labor on her own.  Pitocin augmentation has been started.  Cervix is favorable.  Patient plans to get  an epidural.  Unknown group B strep so penicillin is going.  Once patient stabilizes with blood pressures I will either perform an amniotomy or let her get her epidural depending on how her pain is with her contractions.  CBC and CMP are within normal limits no evidence of thrombocytopenia or elevated liver functions.  Creatinine is also normal.  Proteinuria is present at 2+ on urinalysis.  Pelvis feels adequate for vaginal birth    Patient comfortable with epidural.  Amniotomy performed clear fluid.  Anticipate labor progress.  Magnesium running at 2 g an hour.  Urine output at this point looks excellent.  Penicillin is on board.    Progressing in labor.  On magnesium for prophylaxis for seizures.  Mag level is being drawn right now.  Urine output is starting to decrease a bit.  Has been 100 over the last 2 hours.  Appears very concentrated.  Patient received hydralazine 10 mg for this last elevated blood pressure and that worked well with a blood pressure now of 137/72.  Discussed more of the history.  Patient has had history of chronic hypertension which was diagnosed in her teenage years.  She is never been given a known reason for this.  She denies any history of renal artery stenosis.  And was not on blood pressure medicines in the pregnancy.  Reports early pregnancy blood pressures were systolics in the 140s.    Severe hypertensive.  I consulted Maternal-Fetal Medicine all sure.  Dr. Willy lopez.  Discussed the patient's care plan.  He is now recommending Procardia 10 mg orally every 30 minutes up to 30 mg.  We have discussed that the patient is already on magnesium and he feels that benefits outweigh the risks.  I have discussed the patient remains 8 cm and my concern that she will need a  section.  We have discussed if this does not work would proceed with  section secondary to uncontrolled hypertensive crisis and protracted labor process at this point.  Repeating a stat CBC now.  Last Mag  level was 5.6.  Magnesium turned down to 1 gram/hour.  Urine output approximately 50 an hour.          Marya Spain MD  Obstetrics  [facility]

## 2023-11-20 NOTE — NURSING
Hospitalist @ bs.  Discussing POC with Dr. Spain and pt.  Will transfer to ICU for BP management..

## 2023-11-20 NOTE — PROGRESS NOTES
[facility]  Obstetrics  Labor Progress Note    Patient Name: Zoran Dubose  MRN: [id number]  Admission Date: 2023  Hospital Length of Stay: 1 days  Attending Physician: Marya Spain MD  Primary Care Provider: June Zaragoza MD    Subjective:     Principal Problem:Gestational hypertension, third trimester    Hospital Course:  No notes on file    Interval History:  Zoran is a 23 y.o.  at 39w2d.  No complaints still resting comfortably    Objective:     Vital Signs (Most Recent):  Temp: 98.7 °F (37.1 °C) (23 2300)  Pulse: 79 (23 0656)  Resp: 18 (23 0300)  BP: (!) 191/107 (23 0656)  SpO2: 97 % (23 06) Vital Signs (24h Range):  Temp:  [97.4 °F (36.3 °C)-98.7 °F (37.1 °C)] 98.7 °F (37.1 °C)  Pulse:  [52-94] 79  Resp:  [18-20] 18  SpO2:  [97 %-99 %] 97 %  BP: (133-197)/() 191/107     Weight: 102.1 kg (225 lb)  Body mass index is 42.51 kg/m².    FHT:  1 30s with decreased variability on magnesium.  No decelerations    TOCO:  Q 1-2 minutes    Cervical Exam:  Dilation:  8  Effacement:  100%  Station: -1  Presentation: Vertex     Significant Labs:  Lab Results   Component Value Date    GROUPTRH A POS 2023    STREPBCULT unknown 2023       CBC:   Recent Labs   Lab 23  1700   WBC 12.81*   RBC 4.58   HGB 11.2*   HCT 34.8*      MCV 76*   MCH 24.5*   MCHC 32.2     I have personallly reviewed all pertinent lab results from the last 24 hours.    Physical Exam    Review of Systems  Assessment/Plan:     23 y.o. female  at 39w2d for:    * Gestational hypertension, third trimester  Intrauterine pregnancy at 39 weeks 1 day gestation next time preeclampsia with elevated blood pressures and proteinuria.  Severe at this point.  Magnesium prophylaxis was started.  Patient may be in early labor on her own.  Pitocin augmentation has been started.  Cervix is favorable.  Patient plans to get an epidural.  Unknown group B strep so penicillin is  going.  Once patient stabilizes with blood pressures I will either perform an amniotomy or let her get her epidural depending on how her pain is with her contractions.  CBC and CMP are within normal limits no evidence of thrombocytopenia or elevated liver functions.  Creatinine is also normal.  Proteinuria is present at 2+ on urinalysis.  Pelvis feels adequate for vaginal birth    Patient comfortable with epidural.  Amniotomy performed clear fluid.  Anticipate labor progress.  Magnesium running at 2 g an hour.  Urine output at this point looks excellent.  Penicillin is on board.    Progressing in labor.  On magnesium for prophylaxis for seizures.  Mag level is being drawn right now.  Urine output is starting to decrease a bit.  Has been 100 over the last 2 hours.  Appears very concentrated.  Patient received hydralazine 10 mg for this last elevated blood pressure and that worked well with a blood pressure now of 137/72.  Discussed more of the history.  Patient has had history of chronic hypertension which was diagnosed in her teenage years.  She is never been given a known reason for this.  She denies any history of renal artery stenosis.  And was not on blood pressure medicines in the pregnancy.  Reports early pregnancy blood pressures were systolics in the 140s.    Severe hypertensive.  I consulted Maternal-Fetal Medicine all sure.  Dr. Willy lopez.  Discussed the patient's care plan.  He is now recommending Procardia 10 mg orally every 30 minutes up to 30 mg.  We have discussed that the patient is already on magnesium and he feels that benefits outweigh the risks.  I have discussed the patient remains 8 cm and my concern that she will need a  section.  We have discussed if this does not work would proceed with  section secondary to uncontrolled hypertensive crisis and protracted labor process at this point.  Repeating a stat CBC now.  Last Mag level was 5.6.  Magnesium turned down to 1 gram/hour.   Urine output approximately 50 an hour.    Patient received 10 mg of Procardia p.o..  30 minutes later her blood pressure is actually highest that it has been systolic 190 diastolic 107.  Will proceed with  section.  Spoke with patient understands risks especially hemorrhage with maneuvers and hysterectomy.  We will see what her blood pressures do post delivery and see if she needs to go to the ICU for any sort of blood pressure management          Marya Spain MD  Obstetrics  [facility]

## 2023-11-20 NOTE — TRANSFER OF CARE
"Anesthesia Transfer of Care Note    Patient: Zoran Dubose    Procedure(s) Performed: * No procedures listed *    Patient location: Labor and Delivery    Anesthesia Type: general    Transport from OR: Transported from OR on 2-3 L/min O2 by NC with adequate spontaneous ventilation    Post pain: adequate analgesia    Post assessment: no apparent anesthetic complications and tolerated procedure well    Post vital signs: stable    Level of consciousness: awake    Nausea/Vomiting: no nausea/vomiting    Complications: none    Transfer of care protocol was followed      Last vitals: Visit Vitals  BP (!) 191/107   Pulse 81   Temp 37.1 °C (98.7 °F) (Oral)   Resp 18   Ht 5' 1" (1.549 m)   Wt 102.1 kg (225 lb)   LMP 02/22/2023 (Approximate)   SpO2 99%   Breastfeeding No   BMI 42.51 kg/m²     "

## 2023-11-20 NOTE — NURSING
[facility]  Department of Obstetrics and Gynecology  Labor & Delivery Triage Assessment    PATIENT NAME: Zoran Dubose  MRN: [id number]  TODAY'S DATE: 2023    CHIEF COMPLAINT: Vaginal Bleeding      OB History    Para Term  AB Living   1 0 0 0 0 0   SAB IAB Ectopic Multiple Live Births   0 0 0 0 0      # Outcome Date GA Lbr Zack/2nd Weight Sex Delivery Anes PTL Lv   1 Current              Past Medical History:   Diagnosis Date    Hypertension      History reviewed. No pertinent surgical history.      VITAL SIGNS - ABNORMAL VITALS INCLUDE TEMP >100.4,RR <12 or >26, SUSTAINED MATERNAL PULSE <60 or >120     VITAL SIGNS (Most Recent)  Temp: 97.4 °F (36.3 °C) (23)  Pulse: 78 (23)  Resp: 20 (23)  BP: (!) 146/85 (23)  SpO2: 99 % (23)    VITAL SIGNS     normal  HEADACHE    no     VOMITING    no  VISUAL DISTURBANCES  no  EPIGASTRIC PAIN        no  PROTEINURIA 2+ or MORE             yes   EDEMA FACE/EXTREMITIES            yes    FETAL MOVEMENT     FETAL MOVEMENT: Active  FETAL HEART RATE BASELINE =  140    normal  FETAL HEART RATE VARIABILITY:  Moderate  FETAL HEART RATE ACCELERATIONS FOR GESTATIONAL AGE: present  FETAL HEART RATE DECELERATIONS: none    ABDOMINAL PAIN/CRAMPING/CONTRACTIONS     Patient is not complaining of abdominal pain/cramping/contractions.    RUPTURE OF MEMBRANES OR LEAKING OF AMNIOTIC FLUID     Patient denies ROM or leaking of amniotic fluid.    VAGINAL BLEEDING     Patient reports vaginal bleeding. Vaginal bleeding was absent on arrival. Vaginal bleeding started 1400 and the patient reports the amount as spotting.    VAGINAL EXAM     DILATION:  1   STATION:  90  EFFACEMENT:  -3  PRESENTATION:  vertex    VAGINAL EXAM DEFERRED DUE TO:   none    PAIN PRESENT ON ARRIVAL     ONSET:   0  LOCATION:  0  PAIN SCALE (0-10): 0  DESCRIPTION: 0    Interventions     {IV, labs, meds    PATIENT DISPOSITION     Admitted to Labor &  Delivery      Dr. strong notified at 1609 of the above assessment.    Elva Hamilton, RN  [facility]  11/19/2023

## 2023-11-20 NOTE — H&P
[facility]  Obstetrics  History & Physical    Patient Name: Zoran Dubose  MRN: [id number]  Admission Date: 2023  Primary Care Provider: June Zaragoza MD    Subjective:     Principal Problem:Gestational hypertension, third trimester    History of Present Illness:  No notes on file    Obstetric HPI:  23-year-old  1 at 39 weeks 1 day gestation with a due date of 2023 which is consistent with an ultrasound that I see in the system at 16 weeks' gestation.  She presents complaining of vaginal spotting.  Upon admission patient was found to have very elevated blood pressures.  Being that she was term gestation we began to evaluate and manage this with hydralazine and hypertension protocol.  Patient was not having any pain complaints on admission but once we admitted her she started to have tightening pain and requested IV pain medicine.  Patient denies any other medical problems besides history of chronic hypertension.  Patient has not seen the doctor in over 6 weeks.      This pregnancy has been complicated by chronic hypertension    OB History    Para Term  AB Living   1 0 0 0 0 0   SAB IAB Ectopic Multiple Live Births   0 0 0 0 0      # Outcome Date GA Lbr Zack/2nd Weight Sex Delivery Anes PTL Lv   1 Current              Past Medical History:   Diagnosis Date    Hypertension      History reviewed. No pertinent surgical history.    PTA Medications   Medication Sig    prenatal vit/iron fum/folic ac (PRENATAL 1+1 ORAL) Take 1 tablet by mouth once daily.    acetaminophen (TYLENOL) 650 MG TbSR Take 1 tablet (650 mg total) by mouth every 8 (eight) hours.    dexchlorphen-phenylephrine-DM (POLYTUSSIN DM) 1-5-10 mg/5 mL Syrp Take 10 mLs by mouth every 4 (four) hours as needed.    fluticasone propionate (FLONASE) 50 mcg/actuation nasal spray 1 spray (50 mcg total) by Each Nostril route once daily.    ibuprofen (ADVIL,MOTRIN) 600 MG tablet Take 1 tablet (600 mg total) by  mouth every 6 (six) hours as needed for Pain.    ondansetron (ZOFRAN) 4 MG tablet Take 1 tablet (4 mg total) by mouth every 6 (six) hours.    promethazine (PHENERGAN) 25 MG tablet Take 1 tablet (25 mg total) by mouth every 6 (six) hours as needed for Nausea.       Review of patient's allergies indicates:  No Known Allergies     Family History       Problem Relation (Age of Onset)    Diabetes Maternal Grandmother, Paternal Grandmother    Hypertension Maternal Grandmother, Paternal Grandmother, Paternal Grandfather    No Known Problems Mother    Seizures Father          Tobacco Use    Smoking status: Never    Smokeless tobacco: Not on file   Substance and Sexual Activity    Alcohol use: No    Drug use: No    Sexual activity: Never     Review of Systems   Objective:     Vital Signs (Most Recent):  Temp: 97.4 °F (36.3 °C) (11/19/23 1543)  Pulse: 94 (11/19/23 1726)  Resp: 20 (11/19/23 1726)  BP: (!) 173/93 (11/19/23 1726)  SpO2: 99 % (11/19/23 1543) Vital Signs (24h Range):  Temp:  [97.4 °F (36.3 °C)] 97.4 °F (36.3 °C)  Pulse:  [52-94] 94  Resp:  [18-20] 20  SpO2:  [99 %] 99 %  BP: (152-192)/() 173/93     Weight: 102.1 kg (225 lb)  Body mass index is 42.51 kg/m².    FHT:  140s with good long-term variability and accelerations.  Currently variability slightly decreased with Nubain IV. Cat 1 (reassuring)  TOCO:  Some contractions and irritability noted     Physical Exam  Patient appears uncomfortable not very talkative   Denies headache or visual changes  Patient is status post 3 doses of hydralazine and 2 doses of labetalol at this point.  We are starting magnesium sulfate prophylaxis as well.  Estimated fetal weight 7 lb 12 oz by my Leopold's.  Ultrasound estimated 7 lb with a normal JOSE LUIS.  Extremities trace edema 2+ DTRs     Cervix:  Dilation:  2  Effacement:  75%  Station: -3  Presentation: Vertex     Significant Labs:  Lab Results   Component Value Date    GROUPTRH A POS 11/19/2023   UA demonstrates 2+ protein 3+  "blood    CBC:   Recent Labs   Lab 23  1700   WBC 12.81*   RBC 4.58   HGB 11.2*   HCT 34.8*      MCV 76*   MCH 24.5*   MCHC 32.2     CMP:   Recent Labs   Lab 23  1700   GLU 70   CALCIUM 8.9   ALBUMIN 3.3*   PROT 5.9*   *   K 4.1   CO2 19*      BUN 8   CREATININE 0.9   ALKPHOS 145*   ALT 12   AST 20   BILITOT 0.3     HIV: No results for input(s): "[id number]" in the last 48 hours.  I have personallly reviewed all pertinent lab results from the last 24 hours.  Assessment/Plan:     23 y.o. female  at 39w1d for:    * Gestational hypertension, third trimester  Intrauterine pregnancy at 39 weeks 1 day gestation next time preeclampsia with elevated blood pressures and proteinuria.  Severe at this point.  Magnesium prophylaxis was started.  Patient may be in early labor on her own.  Pitocin augmentation has been started.  Cervix is favorable.  Patient plans to get an epidural.  Unknown group B strep so penicillin is going.  Once patient stabilizes with blood pressures I will either perform an amniotomy or let her get her epidural depending on how her pain is with her contractions.  CBC and CMP are within normal limits no evidence of thrombocytopenia or elevated liver functions.  Creatinine is also normal.  Proteinuria is present at 2+ on urinalysis.  Pelvis feels adequate for vaginal birth            Marya Spain MD  Obstetrics  [facility]        "

## 2023-11-20 NOTE — ASSESSMENT & PLAN NOTE
Intrauterine pregnancy at 39 weeks 1 day gestation next time preeclampsia with elevated blood pressures and proteinuria.  Severe at this point.  Magnesium prophylaxis was started.  Patient may be in early labor on her own.  Pitocin augmentation has been started.  Cervix is favorable.  Patient plans to get an epidural.  Unknown group B strep so penicillin is going.  Once patient stabilizes with blood pressures I will either perform an amniotomy or let her get her epidural depending on how her pain is with her contractions.  CBC and CMP are within normal limits no evidence of thrombocytopenia or elevated liver functions.  Creatinine is also normal.  Proteinuria is present at 2+ on urinalysis.  Pelvis feels adequate for vaginal birth    Patient comfortable with epidural.  Amniotomy performed clear fluid.  Anticipate labor progress.  Magnesium running at 2 g an hour.  Urine output at this point looks excellent.  Penicillin is on board.    Progressing in labor.  On magnesium for prophylaxis for seizures.  Mag level is being drawn right now.  Urine output is starting to decrease a bit.  Has been 100 over the last 2 hours.  Appears very concentrated.  Patient received hydralazine 10 mg for this last elevated blood pressure and that worked well with a blood pressure now of 137/72.  Discussed more of the history.  Patient has had history of chronic hypertension which was diagnosed in her teenage years.  She is never been given a known reason for this.  She denies any history of renal artery stenosis.  And was not on blood pressure medicines in the pregnancy.  Reports early pregnancy blood pressures were systolics in the 140s.

## 2023-11-20 NOTE — SUBJECTIVE & OBJECTIVE
Interval History:  Zoran is a 23 y.o.  at 39w1d. She is doing well.  Comfortable now with epidural    Objective:     Vital Signs (Most Recent):  Temp: 97.4 °F (36.3 °C) (23 1543)  Pulse: 78 (23 1840)  Resp: 20 (23 1840)  BP: (!) 146/85 (23 1840)  SpO2: 99 % (23 1543) Vital Signs (24h Range):  Temp:  [97.4 °F (36.3 °C)] 97.4 °F (36.3 °C)  Pulse:  [52-94] 78  Resp:  [18-20] 20  SpO2:  [99 %] 99 %  BP: (146-192)/() 146/85     Weight: 102.1 kg (225 lb)  Body mass index is 42.51 kg/m².    FHT:  140s with fair variability.  Slight decreased with magnesium.  Patient just received epidural.  Occasional variable type decelerations.  And early decelerations noted once amniotomy performed.  Cat 2 (reassuring)  TOCO:  irreg minutes    Cervical Exam:  Dilation:  3  Effacement:  80%  Station: -2  Presentation: Vertex     Significant Labs:  Lab Results   Component Value Date    GROUPTRH A POS 2023    STREPBCULT unknown 2023       I have personallly reviewed all pertinent lab results from the last 24 hours.    Physical Exam    Review of Systems

## 2023-11-20 NOTE — NURSING
Nurses Note -- 4 Eyes      11/19/2023   7:17 PM      Skin assessed during: Admit      [] No Altered Skin Integrity Present    []Prevention Measures Documented      [] Yes- Altered Skin Integrity Present or Discovered   [] LDA Added if Not in Epic (Describe Wound)   [] New Altered Skin Integrity was Present on Admit and Documented in LDA   [] Wound Image Taken    Wound Care Consulted? No    Attending Nurse:     KELLI Hamilton rn   Second RN/Staff Member:   EKATERINA Vu RN

## 2023-11-20 NOTE — SUBJECTIVE & OBJECTIVE
Past Medical History:   Diagnosis Date    Hypertension        History reviewed. No pertinent surgical history.    Review of patient's allergies indicates:  No Known Allergies    No current facility-administered medications on file prior to encounter.     Current Outpatient Medications on File Prior to Encounter   Medication Sig    prenatal vit/iron fum/folic ac (PRENATAL 1+1 ORAL) Take 1 tablet by mouth once daily.    acetaminophen (TYLENOL) 650 MG TbSR Take 1 tablet (650 mg total) by mouth every 8 (eight) hours.    dexchlorphen-phenylephrine-DM (POLYTUSSIN DM) 1-5-10 mg/5 mL Syrp Take 10 mLs by mouth every 4 (four) hours as needed.    fluticasone propionate (FLONASE) 50 mcg/actuation nasal spray 1 spray (50 mcg total) by Each Nostril route once daily.    ibuprofen (ADVIL,MOTRIN) 600 MG tablet Take 1 tablet (600 mg total) by mouth every 6 (six) hours as needed for Pain.    ondansetron (ZOFRAN) 4 MG tablet Take 1 tablet (4 mg total) by mouth every 6 (six) hours.    promethazine (PHENERGAN) 25 MG tablet Take 1 tablet (25 mg total) by mouth every 6 (six) hours as needed for Nausea.     Family History       Problem Relation (Age of Onset)    Diabetes Maternal Grandmother, Paternal Grandmother    Hypertension Maternal Grandmother, Paternal Grandmother, Paternal Grandfather    No Known Problems Mother    Seizures Father          Tobacco Use    Smoking status: Never    Smokeless tobacco: Not on file   Substance and Sexual Activity    Alcohol use: No    Drug use: Yes     Types: Marijuana    Sexual activity: Never     Review of Systems   Constitutional:  Positive for fatigue.   HENT: Negative.     Eyes:  Negative for visual disturbance.   Respiratory: Negative.     Gastrointestinal:  Positive for abdominal pain. Negative for nausea and vomiting.   Genitourinary: Negative.    Musculoskeletal: Negative.    Skin: Negative.    Neurological:  Positive for light-headedness.   Psychiatric/Behavioral: Negative.       Objective:      Vital Signs (Most Recent):  Temp: 98 °F (36.7 °C) (11/20/23 1209)  Pulse: 71 (11/20/23 1739)  Resp: 18 (11/20/23 1433)  BP: (!) 158/89 (11/20/23 1739)  SpO2: 97 % (11/20/23 1731) Vital Signs (24h Range):  Temp:  [97.8 °F (36.6 °C)-98.7 °F (37.1 °C)] 98 °F (36.7 °C)  Pulse:  [66-94] 71  Resp:  [16-20] 18  SpO2:  [95 %-99 %] 97 %  BP: (133-197)/() 158/89     Weight: 102.1 kg (225 lb)  Body mass index is 42.51 kg/m².     Physical Exam  Constitutional:       Appearance: She is obese. She is not diaphoretic.   HENT:      Head: Normocephalic and atraumatic.      Nose: Nose normal. No congestion.      Mouth/Throat:      Mouth: Mucous membranes are dry.      Pharynx: Oropharynx is clear.   Eyes:      Extraocular Movements: Extraocular movements intact.      Conjunctiva/sclera: Conjunctivae normal.   Cardiovascular:      Rate and Rhythm: Normal rate and regular rhythm.      Pulses: Normal pulses.      Heart sounds: No murmur heard.  Pulmonary:      Effort: Pulmonary effort is normal.      Breath sounds: No wheezing, rhonchi or rales.   Abdominal:      Tenderness: There is abdominal tenderness. There is no guarding.   Genitourinary:     Comments: Leigh in place   Musculoskeletal:      Cervical back: Normal range of motion and neck supple. No rigidity.      Right lower leg: Edema present.      Left lower leg: Edema present.   Skin:     General: Skin is warm and dry.      Capillary Refill: Capillary refill takes less than 2 seconds.   Neurological:      Mental Status: She is oriented to person, place, and time.      Cranial Nerves: No cranial nerve deficit.      Deep Tendon Reflexes: Reflexes abnormal.   Psychiatric:         Mood and Affect: Mood normal.         Behavior: Behavior normal.                Significant Labs: All pertinent labs within the past 24 hours have been reviewed.  Recent Lab Results         11/20/23  1301   11/20/23  0725   11/20/23  0358   11/19/23  2141        Baso #   0.03           Basophil %    0.2           Differential Method   Automated           Eos #   0.0           Eosinophil %   0.0           Gran # (ANC)   15.9           Gran %   87.0           Hematocrit   35.4           Hemoglobin   11.4           Immature Grans (Abs)   0.15  Comment: Mild elevation in immature granulocytes is non specific and   can be seen in a variety of conditions including stress response,   acute inflammation, trauma and pregnancy. Correlation with other   laboratory and clinical findings is essential.             Immature Granulocytes   0.8           Lymph #   1.4           Lymph %   7.8           Magnesium  4.4   5.2  Comment: Critical result MG 5.2 mg/dL called to and read back by Aida Pham RN/l&E&d at 20-Nov-2023 08:21 by [facility].  Result Rechecked     5.6  Comment: Critical result MG 5.6 mg/dL called to and read back by Tabitha Alfaro RN  LND at 20-Nov-2023 04:50 by [facility].     4.2       MCH   24.3           MCHC   32.2           MCV   75           Mono #   0.8           Mono %   4.2           MPV   11.0           nRBC   0           Platelet Count   324           RBC   4.70           RDW   16.4           WBC   18.23                   Significant Imaging: I have reviewed all pertinent imaging results/findings within the past 24 hours.

## 2023-11-20 NOTE — ASSESSMENT & PLAN NOTE
Intrauterine pregnancy at 39 weeks 1 day gestation next time preeclampsia with elevated blood pressures and proteinuria.  Severe at this point.  Magnesium prophylaxis was started.  Patient may be in early labor on her own.  Pitocin augmentation has been started.  Cervix is favorable.  Patient plans to get an epidural.  Unknown group B strep so penicillin is going.  Once patient stabilizes with blood pressures I will either perform an amniotomy or let her get her epidural depending on how her pain is with her contractions.  CBC and CMP are within normal limits no evidence of thrombocytopenia or elevated liver functions.  Creatinine is also normal.  Proteinuria is present at 2+ on urinalysis.  Pelvis feels adequate for vaginal birth    Patient comfortable with epidural.  Amniotomy performed clear fluid.  Anticipate labor progress.  Magnesium running at 2 g an hour.  Urine output at this point looks excellent.  Penicillin is on board.    Progressing in labor.  On magnesium for prophylaxis for seizures.  Mag level is being drawn right now.  Urine output is starting to decrease a bit.  Has been 100 over the last 2 hours.  Appears very concentrated.  Patient received hydralazine 10 mg for this last elevated blood pressure and that worked well with a blood pressure now of 137/72.  Discussed more of the history.  Patient has had history of chronic hypertension which was diagnosed in her teenage years.  She is never been given a known reason for this.  She denies any history of renal artery stenosis.  And was not on blood pressure medicines in the pregnancy.  Reports early pregnancy blood pressures were systolics in the 140s.    Severe hypertensive.  I consulted Maternal-Fetal Medicine all sure.  Dr. Willy lopez.  Discussed the patient's care plan.  He is now recommending Procardia 10 mg orally every 30 minutes up to 30 mg.  We have discussed that the patient is already on magnesium and he feels that benefits outweigh the  risks.  I have discussed the patient remains 8 cm and my concern that she will need a  section.  We have discussed if this does not work would proceed with  section secondary to uncontrolled hypertensive crisis and protracted labor process at this point.  Repeating a stat CBC now.  Last Mag level was 5.6.  Magnesium turned down to 1 gram/hour.  Urine output approximately 50 an hour.

## 2023-11-20 NOTE — PROGRESS NOTES
[facility]  Obstetrics  Postpartum Progress Note    Patient Name: Zoran Dubose  MRN: [id number]  Admission Date: 2023  Hospital Length of Stay: 1 days  Attending Physician: Marya Spain MD  Primary Care Provider: June Zaragoza MD    Subjective:     Principal Problem:Gestational hypertension, third trimester    Hospital Course:  No notes on file    Interval History:  Postoperative check    Patient is resting comfortable with Dilaudid.  Blood pressures remain labile and rapidly starting to elevate again.  Last blood pressure was 170/100.  Objective:     Vital Signs (Most Recent):  Temp: 98 °F (36.7 °C) (23 1209)  Pulse: 76 (23 1446)  Resp: 18 (23 1433)  BP: (!) 155/88 (23 1433)  SpO2: 96 % (23 1446) Vital Signs (24h Range):  Temp:  [97.8 °F (36.6 °C)-98.7 °F (37.1 °C)] 98 °F (36.7 °C)  Pulse:  [59-94] 76  Resp:  [16-20] 18  SpO2:  [95 %-99 %] 96 %  BP: (133-197)/() 155/88     Weight: 102.1 kg (225 lb)  Body mass index is 42.51 kg/m².      Intake/Output Summary (Last 24 hours) at 2023 1625  Last data filed at 2023 1422  Gross per 24 hour   Intake 2709.59 ml   Output 4950 ml   Net -2240.41 ml         Significant Labs:  Lab Results   Component Value Date    GROUPTRH A POS 2023    STREPBCULT unknown 2023     Recent Labs   Lab 23  0725   HGB 11.4*   HCT 35.4*   Mag level was 4.4.    I have personallly reviewed all pertinent lab results from the last 24 hours.  And magnesium and A CBC are due around 5 this evening.  Lochia is appropriate   Fundus is firm per the RN.  DTRs are still brisk at 4.4.  Urine output is excellent.  Diuresing tremendously.    Review of Systems  Assessment/Plan:     23 y.o. female  for:    * Gestational hypertension, third trimester  Intrauterine pregnancy at 39 weeks 1 day gestation next time preeclampsia with elevated blood pressures and proteinuria.  Severe at this point.  Magnesium prophylaxis  was started.  Patient may be in early labor on her own.  Pitocin augmentation has been started.  Cervix is favorable.  Patient plans to get an epidural.  Unknown group B strep so penicillin is going.  Once patient stabilizes with blood pressures I will either perform an amniotomy or let her get her epidural depending on how her pain is with her contractions.  CBC and CMP are within normal limits no evidence of thrombocytopenia or elevated liver functions.  Creatinine is also normal.  Proteinuria is present at 2+ on urinalysis.  Pelvis feels adequate for vaginal birth    Patient comfortable with epidural.  Amniotomy performed clear fluid.  Anticipate labor progress.  Magnesium running at 2 g an hour.  Urine output at this point looks excellent.  Penicillin is on board.    Progressing in labor.  On magnesium for prophylaxis for seizures.  Mag level is being drawn right now.  Urine output is starting to decrease a bit.  Has been 100 over the last 2 hours.  Appears very concentrated.  Patient received hydralazine 10 mg for this last elevated blood pressure and that worked well with a blood pressure now of 137/72.  Discussed more of the history.  Patient has had history of chronic hypertension which was diagnosed in her teenage years.  She is never been given a known reason for this.  She denies any history of renal artery stenosis.  And was not on blood pressure medicines in the pregnancy.  Reports early pregnancy blood pressures were systolics in the 140s.    Severe hypertensive.  I consulted Maternal-Fetal Medicine all sure.  Dr. Willy lopez.  Discussed the patient's care plan.  He is now recommending Procardia 10 mg orally every 30 minutes up to 30 mg.  We have discussed that the patient is already on magnesium and he feels that benefits outweigh the risks.  I have discussed the patient remains 8 cm and my concern that she will need a  section.  We have discussed if this does not work would proceed with   section secondary to uncontrolled hypertensive crisis and protracted labor process at this point.  Repeating a stat CBC now.  Last Mag level was 5.6.  Magnesium turned down to 1 gram/hour.  Urine output approximately 50 an hour.    Patient received 10 mg of Procardia p.o..  30 minutes later her blood pressure is actually highest that it has been systolic 190 diastolic 107.  Will proceed with  section.  Spoke with patient understands risks especially hemorrhage with maneuvers and hysterectomy.  We will see what her blood pressures do post delivery and see if she needs to go to the ICU for any sort of blood pressure management    Postoperative check.  Patient will be transferred to either ICU step-down hospitalist has been consulted as blood pressures are trending up.  Patient is still on magnesium and we plan to discontinue that at 5:00 a.m..  Hospitalist is going to consider a drip option or others.        Disposition:  Guarded with blood pressures    Marya Spain MD  Obstetrics  [facility]

## 2023-11-20 NOTE — NURSING
0517- Dr. Spain notified while in a delivery of severe range pressures 170/93. 179/92.     New orders received for 40 mg of labetalol. MD will consult M when out of delivery as we have completed hydralazine and labetalol protocol.

## 2023-11-20 NOTE — NURSING
0446 MD notified of severe range pressures. Dr. Spain onunit for delivery and wants to wait to treat pt until 170/105.

## 2023-11-20 NOTE — ASSESSMENT & PLAN NOTE
Intrauterine pregnancy at 39 weeks 1 day gestation next time preeclampsia with elevated blood pressures and proteinuria.  Severe at this point.  Magnesium prophylaxis was started.  Patient may be in early labor on her own.  Pitocin augmentation has been started.  Cervix is favorable.  Patient plans to get an epidural.  Unknown group B strep so penicillin is going.  Once patient stabilizes with blood pressures I will either perform an amniotomy or let her get her epidural depending on how her pain is with her contractions.  CBC and CMP are within normal limits no evidence of thrombocytopenia or elevated liver functions.  Creatinine is also normal.  Proteinuria is present at 2+ on urinalysis.  Pelvis feels adequate for vaginal birth    Patient comfortable with epidural.  Amniotomy performed clear fluid.  Anticipate labor progress.  Magnesium running at 2 g an hour.  Urine output at this point looks excellent.  Penicillin is on board.    Progressing in labor.  On magnesium for prophylaxis for seizures.  Mag level is being drawn right now.  Urine output is starting to decrease a bit.  Has been 100 over the last 2 hours.  Appears very concentrated.  Patient received hydralazine 10 mg for this last elevated blood pressure and that worked well with a blood pressure now of 137/72.  Discussed more of the history.  Patient has had history of chronic hypertension which was diagnosed in her teenage years.  She is never been given a known reason for this.  She denies any history of renal artery stenosis.  And was not on blood pressure medicines in the pregnancy.  Reports early pregnancy blood pressures were systolics in the 140s.    Severe hypertensive.  I consulted Maternal-Fetal Medicine all sure.  Dr. Willy lopez.  Discussed the patient's care plan.  He is now recommending Procardia 10 mg orally every 30 minutes up to 30 mg.  We have discussed that the patient is already on magnesium and he feels that benefits outweigh the  risks.  I have discussed the patient remains 8 cm and my concern that she will need a  section.  We have discussed if this does not work would proceed with  section secondary to uncontrolled hypertensive crisis and protracted labor process at this point.  Repeating a stat CBC now.  Last Mag level was 5.6.  Magnesium turned down to 1 gram/hour.  Urine output approximately 50 an hour.    Patient received 10 mg of Procardia p.o..  30 minutes later her blood pressure is actually highest that it has been systolic 190 diastolic 107.  Will proceed with  section.  Spoke with patient understands risks especially hemorrhage with maneuvers and hysterectomy.  We will see what her blood pressures do post delivery and see if she needs to go to the ICU for any sort of blood pressure management

## 2023-11-20 NOTE — HPI
OB admit:  23-year-old  1 at 39 weeks 1 day gestation with a due date of 2023 which is consistent with an ultrasound that I see in the system at 16 weeks' gestation.  She presents complaining of vaginal spotting.  Upon admission patient was found to have very elevated blood pressures.  Being that she was term gestation we began to evaluate and manage this with hydralazine and hypertension protocol.  Patient was not having any pain complaints on admission but once we admitted her she started to have tightening pain and requested IV pain medicine.  Patient denies any other medical problems besides history of chronic hypertension.  Patient has not seen the doctor in over 6 weeks.    Patient underwent caesarian section today. OB has given multiple doses of IV hydralazine, labetalol, clonidine patch and oral nifedipine. She had an uneventful delivery. She is receiving Mg infusion with plan to stop at 5am tomorrow. Her urine output is adequate. Hospital medicine consulted for uncontrolled HTN despite multiple medications received. D/W primary team and will transfer to stepdown/ICU (pending bed availability) on cardene infusion. The patient is receiving lactation services.

## 2023-11-20 NOTE — NURSING
0258 MD notified of severe range pressures. Clonidine patch applied. MD ordered another 10mg of hydralazine to be given.

## 2023-11-20 NOTE — NURSING
0052 Dr. Spain called to check in on pt. Bps stable. Pt redosed by anesthesia. MD ordered for cervical exam at 0330 and to change BP frequency to q30 min.

## 2023-11-20 NOTE — ANESTHESIA PROCEDURE NOTES
Epidural    Patient location during procedure: OB   Reason for block: primary anesthetic   Reason for block: labor analgesia requested by patient and obstetrician  Diagnosis: IUP    Start time: 11/19/2023 6:57 PM  Timeout: 11/19/2023 6:57 PM  End time: 11/19/2023 7:16 PM    Staffing  Performing Provider: Adama Ruggiero MD  Authorizing Provider: Adama Ruggiero MD    Staffing  Performed by: Adama Ruggiero MD  Authorized by: Adama Ruggiero MD        Preanesthetic Checklist  Completed: patient identified, IV checked, site marked, risks and benefits discussed, surgical consent, monitors and equipment checked, pre-op evaluation, timeout performed, anesthesia consent given, hand hygiene performed and patient being monitored  Preparation  Patient position: sitting  Prep: Betadine  Patient monitoring: ECG, Pulse Ox and Blood Pressure  Reason for block: primary anesthetic   Epidural  Skin Anesthetic: lidocaine 1%  Administration type: continuous  Approach: midline  Interspace: L3-4    Injection technique: BRIA air  Needle and Epidural Catheter  Needle type: Tuohy   Needle gauge: 17  Needle length: 3.5 inches  Catheter type: springwWanelo  Catheter size: 19 G  Insertion Attempts: 2  Test dose: 3 mL of lidocaine 1.5% with Epi 1-to-200,000  Additional Documentation: incremental injection, negative aspiration for heme and CSF, no paresthesia on injection, no signs/symptoms of IV or SA injection, no significant pain on injection and no significant complaints from patient  Needle localization: anatomical landmarks  Assessment  Ease of block: easy  Patient's tolerance of the procedure: comfortable throughout block and no complaints  Additional Notes  Initially attempted epidural placement in the L3-4 interspace however unable to locate epidural space without angle of approach.  Second attempt the epidural needle was placed more caudad in the C3-4 interspace and the epidural was placed without difficulty using  sterile loss of resistance technique.  Patient tolerated the procedure well. No inadvertent dural puncture with Tuohy.  Dural puncture not performed with spinal needle

## 2023-11-20 NOTE — ANESTHESIA PROCEDURE NOTES
Intubation    Date/Time: 11/20/2023 7:46 AM    Performed by: Aislinn Harding CRNA  Authorized by: Adama Ruggiero MD    Intubation:     Induction:  Intravenous    Intubated:  Postinduction    Mask Ventilation:  N/a    Attempts:  1    Attempted By:  CRNA    Method of Intubation:  Video laryngoscopy    Blade:  Malin 3    Laryngeal View Grade: Grade I - full view of cords      Difficult Airway Encountered?: No      Complications:  None    Airway Device:  Oral endotracheal tube    Airway Device Size:  7.0    Style/Cuff Inflation:  Cuffed    Secured at:  The lips    Placement Verified By:  Capnometry    Complicating Factors:  None    Findings Post-Intubation:  BS equal bilateral and atraumatic/condition of teeth unchanged

## 2023-11-20 NOTE — SUBJECTIVE & OBJECTIVE
Interval History:  Zoran is a 23 y.o.  at 39w2d.  No complaints still resting comfortably    Objective:     Vital Signs (Most Recent):  Temp: 98.7 °F (37.1 °C) (23 2300)  Pulse: 79 (23 0656)  Resp: 18 (23 0300)  BP: (!) 191/107 (23 0656)  SpO2: 97 % (23 0656) Vital Signs (24h Range):  Temp:  [97.4 °F (36.3 °C)-98.7 °F (37.1 °C)] 98.7 °F (37.1 °C)  Pulse:  [52-94] 79  Resp:  [18-20] 18  SpO2:  [97 %-99 %] 97 %  BP: (133-197)/() 191/107     Weight: 102.1 kg (225 lb)  Body mass index is 42.51 kg/m².    FHT:  1 30s with decreased variability on magnesium.  No decelerations    TOCO:  Q 1-2 minutes    Cervical Exam:  Dilation:  8  Effacement:  100%  Station: -1  Presentation: Vertex     Significant Labs:  Lab Results   Component Value Date    GROUPTRH A POS 2023    STREPBCULT unknown 2023       CBC:   Recent Labs   Lab 23  1700   WBC 12.81*   RBC 4.58   HGB 11.2*   HCT 34.8*      MCV 76*   MCH 24.5*   MCHC 32.2     I have personallly reviewed all pertinent lab results from the last 24 hours.    Physical Exam    Review of Systems

## 2023-11-20 NOTE — ASSESSMENT & PLAN NOTE
Intrauterine pregnancy at 39 weeks 1 day gestation next time preeclampsia with elevated blood pressures and proteinuria.  Severe at this point.  Magnesium prophylaxis was started.  Patient may be in early labor on her own.  Pitocin augmentation has been started.  Cervix is favorable.  Patient plans to get an epidural.  Unknown group B strep so penicillin is going.  Once patient stabilizes with blood pressures I will either perform an amniotomy or let her get her epidural depending on how her pain is with her contractions.  CBC and CMP are within normal limits no evidence of thrombocytopenia or elevated liver functions.  Creatinine is also normal.  Proteinuria is present at 2+ on urinalysis.  Pelvis feels adequate for vaginal birth    Patient comfortable with epidural.  Amniotomy performed clear fluid.  Anticipate labor progress.  Magnesium running at 2 g an hour.  Urine output at this point looks excellent.  Penicillin is on board.

## 2023-11-20 NOTE — LACTATION NOTE
11/20/23 1400   Maternal Assessment   Breast Size Issue none   Breast Shape round   Breast Density soft   Areola elastic   Nipples everted   Equipment Type   Breast Pump Type double electric, hospital grade   Breast Pump Flange Type hard   Breast Pump Flange Size 24 mm   Breast Pumping   Breast Pumping Interventions frequent pumping encouraged;early pumping promoted   Breast Pumping double electric breast pump utilized     ZeaKalhony pump, tubing, collections containers and labels brought to bedside.  Discussed proper pump setting of initiation phase.  Instructed on proper usage of pump and to adjust suction according to maximum comfort level.  Verified appropriate flange fit.  Educated on the frequency and duration of pumping in order to promote and maintain a full milk supply.  Hands on pumping technique reviewed.  Encouraged hand expression after pumping.  Instructed on cleaning of breast pump parts.  Written instructions also given.  Pt verbalized understanding and appropriate recall for proper milk handling, collection, labeling, storage and transportation.

## 2023-11-20 NOTE — PLAN OF CARE
Problem: Adult Inpatient Plan of Care  Goal: Plan of Care Review  Outcome: Ongoing, Progressing  Goal: Patient-Specific Goal (Individualized)  Outcome: Ongoing, Progressing  Goal: Absence of Hospital-Acquired Illness or Injury  Outcome: Ongoing, Progressing  Goal: Optimal Comfort and Wellbeing  Outcome: Ongoing, Progressing  Goal: Readiness for Transition of Care  Outcome: Ongoing, Progressing     Problem: Bariatric Environmental Safety  Goal: Safety Maintained with Care  Outcome: Ongoing, Progressing     Problem:  Fall Injury Risk  Goal: Absence of Fall, Infant Drop and Related Injury  Outcome: Ongoing, Progressing     Problem: Infection  Goal: Absence of Infection Signs and Symptoms  Outcome: Ongoing, Progressing     Problem: Bleeding (Labor)  Goal: Hemostasis  Outcome: Ongoing, Progressing     Problem: Change in Fetal Wellbeing (Labor)  Goal: Stable Fetal Wellbeing  Outcome: Ongoing, Progressing

## 2023-11-21 LAB
ALBUMIN SERPL BCP-MCNC: 3.1 G/DL (ref 3.5–5.2)
ALP SERPL-CCNC: 121 U/L (ref 55–135)
ALT SERPL W/O P-5'-P-CCNC: 9 U/L (ref 10–44)
ANION GAP SERPL CALC-SCNC: 9 MMOL/L (ref 8–16)
AST SERPL-CCNC: 19 U/L (ref 10–40)
BACTERIA UR CULT: NORMAL
BACTERIA UR CULT: NORMAL
BASOPHILS # BLD AUTO: 0.02 K/UL (ref 0–0.2)
BASOPHILS NFR BLD: 0.1 % (ref 0–1.9)
BILIRUB SERPL-MCNC: 0.2 MG/DL (ref 0.1–1)
BUN SERPL-MCNC: 5 MG/DL (ref 6–20)
CALCIUM SERPL-MCNC: 8 MG/DL (ref 8.7–10.5)
CHLORIDE SERPL-SCNC: 103 MMOL/L (ref 95–110)
CO2 SERPL-SCNC: 21 MMOL/L (ref 23–29)
CREAT SERPL-MCNC: 0.7 MG/DL (ref 0.5–1.4)
DIFFERENTIAL METHOD: ABNORMAL
EOSINOPHIL # BLD AUTO: 0 K/UL (ref 0–0.5)
EOSINOPHIL NFR BLD: 0.1 % (ref 0–8)
ERYTHROCYTE [DISTWIDTH] IN BLOOD BY AUTOMATED COUNT: 16.4 % (ref 11.5–14.5)
EST. GFR  (NO RACE VARIABLE): >60 ML/MIN/1.73 M^2
GLUCOSE SERPL-MCNC: 99 MG/DL (ref 70–110)
HCT VFR BLD AUTO: 34.6 % (ref 37–48.5)
HGB BLD-MCNC: 11.1 G/DL (ref 12–16)
IMM GRANULOCYTES # BLD AUTO: 0.07 K/UL (ref 0–0.04)
IMM GRANULOCYTES NFR BLD AUTO: 0.4 % (ref 0–0.5)
LYMPHOCYTES # BLD AUTO: 1.2 K/UL (ref 1–4.8)
LYMPHOCYTES NFR BLD: 6.3 % (ref 18–48)
MAGNESIUM SERPL-MCNC: 4 MG/DL (ref 1.6–2.6)
MAGNESIUM SERPL-MCNC: 4 MG/DL (ref 1.6–2.6)
MCH RBC QN AUTO: 24.2 PG (ref 27–31)
MCHC RBC AUTO-ENTMCNC: 32.1 G/DL (ref 32–36)
MCV RBC AUTO: 75 FL (ref 82–98)
MONOCYTES # BLD AUTO: 1 K/UL (ref 0.3–1)
MONOCYTES NFR BLD: 5.4 % (ref 4–15)
NEUTROPHILS # BLD AUTO: 16.5 K/UL (ref 1.8–7.7)
NEUTROPHILS NFR BLD: 87.7 % (ref 38–73)
NRBC BLD-RTO: 0 /100 WBC
PLATELET # BLD AUTO: 320 K/UL (ref 150–450)
PMV BLD AUTO: 10.5 FL (ref 9.2–12.9)
POTASSIUM SERPL-SCNC: 4 MMOL/L (ref 3.5–5.1)
PROT SERPL-MCNC: 6.2 G/DL (ref 6–8.4)
RBC # BLD AUTO: 4.59 M/UL (ref 4–5.4)
SODIUM SERPL-SCNC: 133 MMOL/L (ref 136–145)
WBC # BLD AUTO: 18.8 K/UL (ref 3.9–12.7)

## 2023-11-21 PROCEDURE — 80053 COMPREHEN METABOLIC PANEL: CPT | Performed by: HOSPITALIST

## 2023-11-21 PROCEDURE — 63600175 PHARM REV CODE 636 W HCPCS: Performed by: SPECIALIST

## 2023-11-21 PROCEDURE — 93005 ELECTROCARDIOGRAM TRACING: CPT | Performed by: GENERAL PRACTICE

## 2023-11-21 PROCEDURE — 12000002 HC ACUTE/MED SURGE SEMI-PRIVATE ROOM

## 2023-11-21 PROCEDURE — 25000003 PHARM REV CODE 250: Performed by: HOSPITALIST

## 2023-11-21 PROCEDURE — 25000003 PHARM REV CODE 250: Performed by: SPECIALIST

## 2023-11-21 PROCEDURE — 85025 COMPLETE CBC W/AUTO DIFF WBC: CPT | Performed by: HOSPITALIST

## 2023-11-21 PROCEDURE — 94761 N-INVAS EAR/PLS OXIMETRY MLT: CPT

## 2023-11-21 PROCEDURE — 93010 ELECTROCARDIOGRAM REPORT: CPT | Mod: ,,, | Performed by: GENERAL PRACTICE

## 2023-11-21 PROCEDURE — 36415 COLL VENOUS BLD VENIPUNCTURE: CPT | Performed by: HOSPITALIST

## 2023-11-21 PROCEDURE — 99900035 HC TECH TIME PER 15 MIN (STAT)

## 2023-11-21 PROCEDURE — 83735 ASSAY OF MAGNESIUM: CPT | Mod: 91 | Performed by: HOSPITALIST

## 2023-11-21 PROCEDURE — 93010 EKG 12-LEAD: ICD-10-PCS | Mod: ,,, | Performed by: GENERAL PRACTICE

## 2023-11-21 PROCEDURE — 63600175 PHARM REV CODE 636 W HCPCS: Performed by: HOSPITALIST

## 2023-11-21 RX ORDER — HYDRALAZINE HYDROCHLORIDE 25 MG/1
50 TABLET, FILM COATED ORAL EVERY 8 HOURS
Status: DISCONTINUED | OUTPATIENT
Start: 2023-11-21 | End: 2023-11-23 | Stop reason: HOSPADM

## 2023-11-21 RX ADMIN — DOCUSATE SODIUM 200 MG: 100 CAPSULE, LIQUID FILLED ORAL at 08:11

## 2023-11-21 RX ADMIN — HYDROMORPHONE HYDROCHLORIDE 0.5 MG: 1 INJECTION, SOLUTION INTRAMUSCULAR; INTRAVENOUS; SUBCUTANEOUS at 03:11

## 2023-11-21 RX ADMIN — OXYCODONE AND ACETAMINOPHEN 1 TABLET: 5; 325 TABLET ORAL at 05:11

## 2023-11-21 RX ADMIN — MUPIROCIN 1 G: 20 OINTMENT TOPICAL at 08:11

## 2023-11-21 RX ADMIN — HYDRALAZINE HYDROCHLORIDE 25 MG: 25 TABLET ORAL at 12:11

## 2023-11-21 RX ADMIN — CEFAZOLIN SODIUM 2 G: 2 SOLUTION INTRAVENOUS at 11:11

## 2023-11-21 RX ADMIN — Medication: at 09:11

## 2023-11-21 RX ADMIN — NIFEDIPINE 30 MG: 30 TABLET, FILM COATED, EXTENDED RELEASE ORAL at 08:11

## 2023-11-21 RX ADMIN — CEFAZOLIN SODIUM 2 G: 2 SOLUTION INTRAVENOUS at 04:11

## 2023-11-21 RX ADMIN — SODIUM CHLORIDE, SODIUM LACTATE, POTASSIUM CHLORIDE, AND CALCIUM CHLORIDE: .6; .31; .03; .02 INJECTION, SOLUTION INTRAVENOUS at 01:11

## 2023-11-21 RX ADMIN — MISOPROSTOL 200 MCG: 200 TABLET ORAL at 06:11

## 2023-11-21 RX ADMIN — NICARDIPINE HYDROCHLORIDE 5 MG/HR: 0.2 INJECTION, SOLUTION INTRAVENOUS at 08:11

## 2023-11-21 RX ADMIN — HYDRALAZINE HYDROCHLORIDE 25 MG: 25 TABLET ORAL at 06:11

## 2023-11-21 RX ADMIN — OXYCODONE HYDROCHLORIDE AND ACETAMINOPHEN 1 TABLET: 10; 325 TABLET ORAL at 12:11

## 2023-11-21 RX ADMIN — SENNOSIDES AND DOCUSATE SODIUM 1 TABLET: 50; 8.6 TABLET ORAL at 02:11

## 2023-11-21 RX ADMIN — CEFAZOLIN SODIUM 2 G: 2 SOLUTION INTRAVENOUS at 08:11

## 2023-11-21 RX ADMIN — OXYCODONE HYDROCHLORIDE AND ACETAMINOPHEN 1 TABLET: 10; 325 TABLET ORAL at 09:11

## 2023-11-21 RX ADMIN — HYDRALAZINE HYDROCHLORIDE 50 MG: 25 TABLET ORAL at 10:11

## 2023-11-21 RX ADMIN — OXYCODONE HYDROCHLORIDE AND ACETAMINOPHEN 1 TABLET: 10; 325 TABLET ORAL at 08:11

## 2023-11-21 NOTE — ASSESSMENT & PLAN NOTE
Intrauterine pregnancy at 39 weeks 1 day gestation next time preeclampsia with elevated blood pressures and proteinuria.  Severe at this point.  Magnesium prophylaxis was started.  Patient may be in early labor on her own.  Pitocin augmentation has been started.  Cervix is favorable.  Patient plans to get an epidural.  Unknown group B strep so penicillin is going.  Once patient stabilizes with blood pressures I will either perform an amniotomy or let her get her epidural depending on how her pain is with her contractions.  CBC and CMP are within normal limits no evidence of thrombocytopenia or elevated liver functions.  Creatinine is also normal.  Proteinuria is present at 2+ on urinalysis.  Pelvis feels adequate for vaginal birth    Patient comfortable with epidural.  Amniotomy performed clear fluid.  Anticipate labor progress.  Magnesium running at 2 g an hour.  Urine output at this point looks excellent.  Penicillin is on board.    Progressing in labor.  On magnesium for prophylaxis for seizures.  Mag level is being drawn right now.  Urine output is starting to decrease a bit.  Has been 100 over the last 2 hours.  Appears very concentrated.  Patient received hydralazine 10 mg for this last elevated blood pressure and that worked well with a blood pressure now of 137/72.  Discussed more of the history.  Patient has had history of chronic hypertension which was diagnosed in her teenage years.  She is never been given a known reason for this.  She denies any history of renal artery stenosis.  And was not on blood pressure medicines in the pregnancy.  Reports early pregnancy blood pressures were systolics in the 140s.    Severe hypertensive.  I consulted Maternal-Fetal Medicine all sure.  Dr. Willy lopez.  Discussed the patient's care plan.  He is now recommending Procardia 10 mg orally every 30 minutes up to 30 mg.  We have discussed that the patient is already on magnesium and he feels that benefits outweigh the  risks.  I have discussed the patient remains 8 cm and my concern that she will need a  section.  We have discussed if this does not work would proceed with  section secondary to uncontrolled hypertensive crisis and protracted labor process at this point.  Repeating a stat CBC now.  Last Mag level was 5.6.  Magnesium turned down to 1 gram/hour.  Urine output approximately 50 an hour.    Patient received 10 mg of Procardia p.o..  30 minutes later her blood pressure is actually highest that it has been systolic 190 diastolic 107.  Will proceed with  section.  Spoke with patient understands risks especially hemorrhage with maneuvers and hysterectomy.  We will see what her blood pressures do post delivery and see if she needs to go to the ICU for any sort of blood pressure management    Postoperative check.  Patient will be transferred to either ICU step-down hospitalist has been consulted as blood pressures are trending up.  Patient is still on magnesium and we plan to discontinue that at 5:00 a.m..  Hospitalist is going to consider a drip option or others.    Postoperative day 1 primary  section uncontrolled hypertension.  Patient now is on a step-down unit on a hypertensive drip.  Appreciate the help with hypertensive medications.  Patient is interested in breastfeeding and is pumping.  So care with medications that could be used during breastfeeding.  We have a breast feeding consult nurse here that can be consulted regarding any concerns with medications.  Patient needs Leigh out and needs to begin to ambulate.  Magnesium has just been turned off.  And we will start oral medication and discontinued the PCA pump

## 2023-11-21 NOTE — PROGRESS NOTES
[facility]  Obstetrics  Postpartum Progress Note    Patient Name: Zoran Dubose  MRN: [id number]  Admission Date: 2023  Hospital Length of Stay: 2 days  Attending Physician: Ml Walsh MD  Primary Care Provider: June Zaragoza MD    Subjective:     Principal Problem:Gestational hypertension, third trimester    Hospital Course:  No notes on file    Interval History:  Status post primary  section for uncontrolled hypertension remote from delivery    She is doing well this morning. She is tolerating a regular diet without nausea or vomiting. She is not voiding spontaneously.  Leigh catheter still in place She is not ambulating.  In the step-down unit.  Magnesium just turned off She has not passed flatus, and has not a BM. Vaginal bleeding is mild. She denies fever or chills. Abdominal pain is moderate and controlled with oral medications. She Is breastfeeding.    Objective:     Vital Signs (Most Recent):  Temp: 98.5 °F (36.9 °C) (23 0701)  Pulse: (!) 113 (23 08)  Resp: 18 (23 08)  BP: 136/70 (23 0600)  SpO2: 97 % (23 08) Vital Signs (24h Range):  Temp:  [97.9 °F (36.6 °C)-98.5 °F (36.9 °C)] 98.5 °F (36.9 °C)  Pulse:  [] 113  Resp:  [16-22] 18  SpO2:  [94 %-99 %] 97 %  BP: (128-180)/() 136/70     Weight: 102.1 kg (225 lb)  Body mass index is 42.51 kg/m².      Intake/Output Summary (Last 24 hours) at 2023 0824  Last data filed at 2023 0623  Gross per 24 hour   Intake 5105.21 ml   Output 16108 ml   Net -5294.79 ml         Significant Labs:  Lab Results   Component Value Date    GROUPTRH A POS 2023    STREPBCULT unknown 2023     Recent Labs   Lab 23   HGB 11.1*   HCT 34.6*       CBC:   Recent Labs   Lab 23   WBC 18.80*   RBC 4.59   HGB 11.1*   HCT 34.6*      MCV 75*   MCH 24.2*   MCHC 32.1     CMP:   Recent Labs   Lab 23  0215   GLU 99   CALCIUM 8.0*   ALBUMIN 3.1*    PROT 6.2   *   K 4.0   CO2 21*      BUN 5*   CREATININE 0.7   ALKPHOS 121   ALT 9*   AST 19   BILITOT 0.2     I have personallly reviewed all pertinent lab results from the last 24 hours.    Physical Exam  Patient much more alert this morning.  She is on a Cardene drip  Blood pressures are being monitored closely.  Most recent in the room was 153/90 magnesium was still going and I advised them to please discontinue that.  Leigh catheter still in place and I recommended and ordered removal  Dressing dry abdomen appropriately tender   Extremities 2+ edema SCDs in place    Review of Systems  Assessment/Plan:     23 y.o. female  for:    * Gestational hypertension, third trimester  Intrauterine pregnancy at 39 weeks 1 day gestation next time preeclampsia with elevated blood pressures and proteinuria.  Severe at this point.  Magnesium prophylaxis was started.  Patient may be in early labor on her own.  Pitocin augmentation has been started.  Cervix is favorable.  Patient plans to get an epidural.  Unknown group B strep so penicillin is going.  Once patient stabilizes with blood pressures I will either perform an amniotomy or let her get her epidural depending on how her pain is with her contractions.  CBC and CMP are within normal limits no evidence of thrombocytopenia or elevated liver functions.  Creatinine is also normal.  Proteinuria is present at 2+ on urinalysis.  Pelvis feels adequate for vaginal birth    Patient comfortable with epidural.  Amniotomy performed clear fluid.  Anticipate labor progress.  Magnesium running at 2 g an hour.  Urine output at this point looks excellent.  Penicillin is on board.    Progressing in labor.  On magnesium for prophylaxis for seizures.  Mag level is being drawn right now.  Urine output is starting to decrease a bit.  Has been 100 over the last 2 hours.  Appears very concentrated.  Patient received hydralazine 10 mg for this last elevated blood pressure and that  worked well with a blood pressure now of 137/72.  Discussed more of the history.  Patient has had history of chronic hypertension which was diagnosed in her teenage years.  She is never been given a known reason for this.  She denies any history of renal artery stenosis.  And was not on blood pressure medicines in the pregnancy.  Reports early pregnancy blood pressures were systolics in the 140s.    Severe hypertensive.  I consulted Maternal-Fetal Medicine all sure.  Dr. Willy lopez.  Discussed the patient's care plan.  He is now recommending Procardia 10 mg orally every 30 minutes up to 30 mg.  We have discussed that the patient is already on magnesium and he feels that benefits outweigh the risks.  I have discussed the patient remains 8 cm and my concern that she will need a  section.  We have discussed if this does not work would proceed with  section secondary to uncontrolled hypertensive crisis and protracted labor process at this point.  Repeating a stat CBC now.  Last Mag level was 5.6.  Magnesium turned down to 1 gram/hour.  Urine output approximately 50 an hour.    Patient received 10 mg of Procardia p.o..  30 minutes later her blood pressure is actually highest that it has been systolic 190 diastolic 107.  Will proceed with  section.  Spoke with patient understands risks especially hemorrhage with maneuvers and hysterectomy.  We will see what her blood pressures do post delivery and see if she needs to go to the ICU for any sort of blood pressure management    Postoperative check.  Patient will be transferred to either ICU step-down hospitalist has been consulted as blood pressures are trending up.  Patient is still on magnesium and we plan to discontinue that at 5:00 a.m..  Hospitalist is going to consider a drip option or others.    Postoperative day 1 primary  section uncontrolled hypertension.  Patient now is on a step-down unit on a hypertensive drip.  Appreciate the  help with hypertensive medications.  Patient is interested in breastfeeding and is pumping.  So care with medications that could be used during breastfeeding.  We have a breast feeding consult nurse here that can be consulted regarding any concerns with medications.  Patient needs Leigh out and needs to begin to ambulate.  Magnesium has just been turned off.  And we will start oral medication and discontinued the PCA pump        Disposition:  Improving    Marya Spain MD  Obstetrics  [facility]

## 2023-11-21 NOTE — NURSING
Received pt to room 2526 via bed with family @BS. ARCELIA infusing upon transfer. Pt AAOX4, NADN. POC discussed, monse. Leigh in place. Abd dsg in place. Call light in reach, instr to call for assist/needs, monse. See flowsheet for full assessment.

## 2023-11-21 NOTE — NURSING
Nurses Note -- 4 Eyes      11/21/2023   5:44 AM      Skin assessed during: Transfer      [x] No Altered Skin Integrity Present    []Prevention Measures Documented      [] Yes- Altered Skin Integrity Present or Discovered   [] LDA Added if Not in Epic (Describe Wound)   [] New Altered Skin Integrity was Present on Admit and Documented in LDA   [] Wound Image Taken    Wound Care Consulted? No    Attending Nurse:  Lyric Jesus RN/Staff Member:  jaden lentz rn

## 2023-11-21 NOTE — SUBJECTIVE & OBJECTIVE
Interval History:  Status post primary  section for uncontrolled hypertension remote from delivery    She is doing well this morning. She is tolerating a regular diet without nausea or vomiting. She is not voiding spontaneously.  Leigh catheter still in place She is not ambulating.  In the step-down unit.  Magnesium just turned off She has not passed flatus, and has not a BM. Vaginal bleeding is mild. She denies fever or chills. Abdominal pain is moderate and controlled with oral medications. She Is breastfeeding.    Objective:     Vital Signs (Most Recent):  Temp: 98.5 °F (36.9 °C) (23 07)  Pulse: (!) 113 (23)  Resp: 18 (23)  BP: 136/70 (23 0600)  SpO2: 97 % (23) Vital Signs (24h Range):  Temp:  [97.9 °F (36.6 °C)-98.5 °F (36.9 °C)] 98.5 °F (36.9 °C)  Pulse:  [] 113  Resp:  [16-22] 18  SpO2:  [94 %-99 %] 97 %  BP: (128-180)/() 136/70     Weight: 102.1 kg (225 lb)  Body mass index is 42.51 kg/m².      Intake/Output Summary (Last 24 hours) at 2023 0824  Last data filed at 2023 0623  Gross per 24 hour   Intake 5105.21 ml   Output 57581 ml   Net -5294.79 ml         Significant Labs:  Lab Results   Component Value Date    GROUPTRH A POS 2023    STREPBCULT unknown 2023     Recent Labs   Lab 23   HGB 11.1*   HCT 34.6*       CBC:   Recent Labs   Lab 23   WBC 18.80*   RBC 4.59   HGB 11.1*   HCT 34.6*      MCV 75*   MCH 24.2*   MCHC 32.1     CMP:   Recent Labs   Lab 23   GLU 99   CALCIUM 8.0*   ALBUMIN 3.1*   PROT 6.2   *   K 4.0   CO2 21*      BUN 5*   CREATININE 0.7   ALKPHOS 121   ALT 9*   AST 19   BILITOT 0.2     I have personallly reviewed all pertinent lab results from the last 24 hours.    Physical Exam  Patient much more alert this morning.  She is on a Cardene drip  Blood pressures are being monitored closely.  Most recent in the room was 153/90 magnesium was still going  and I advised them to please discontinue that.  Leigh catheter still in place and I recommended and ordered removal  Dressing dry abdomen appropriately tender   Extremities 2+ edema SCDs in place    Review of Systems

## 2023-11-21 NOTE — ANESTHESIA POSTPROCEDURE EVALUATION
Anesthesia Post Evaluation    Patient: Zoran Dubose    Procedure(s) Performed: Procedure(s) (LRB):   SECTION (N/A)    Final Anesthesia Type: general      Patient location during evaluation: floor  Patient participation: Yes- Able to Participate  Level of consciousness: awake and alert and oriented  Post-procedure vital signs: reviewed and stable  Pain management: adequate  Airway patency: patent    PONV status at discharge: No PONV  Anesthetic complications: no      Cardiovascular status: blood pressure returned to baseline and hemodynamically stable  Respiratory status: unassisted, spontaneous ventilation and room air  Hydration status: euvolemic  Follow-up not needed.    Postop day 1. Status post  under GETA, epidural placed for labor but inadequate for C/S patient also received Duramorph for postoperative pain control.  This morning patient is resting comfortably in bed.  She is alert and oriented without complaints.  Patient denies headache, back pain, leg pain weakness or numbness.  Epidural site examined and no bleeding bruising or discharge noted.  Patient reports overall fair pain control since surgery with appropriate use of supplemental pain medications.  She denies any significant nausea vomiting or pruritus.    No apparent anesthetic related complications.    Please re-consult if needed.        Vitals Value Taken Time   /82 23 0830   Temp 36.9 °C (98.5 °F) 23 0701   Pulse 106 23 0852   Resp 24 23 0850   SpO2 96 % 23 0852   Vitals shown include unvalidated device data.      Event Time   Out of Recovery 2023 12:58:00         Pain/Germán Score: Pain Rating Prior to Med Admin: 8 (2023  8:33 AM)  Pain Rating Post Med Admin: 0 (2023  4:29 AM)

## 2023-11-21 NOTE — NURSING TRANSFER
Nursing Transfer Note      11/20/2023   7:35 PM    Nurse giving handoff:Fernanda Gresham RNC  Nurse receiving handoff:Lyric RN    Reason patient is being transferred: delivered uncontrolled hypertensive    Transfer To: 2526    Transfer via bed    Transfer with belongings & breast pump/supplies    Transported by Fernanda Gresham RNC/Lyric RN    Transfer Vital Signs:  Blood Pressure: 152/97  Heart Rate:65  O2:97  Temperature:98.0  Respirations:18    Telemetry: to be applied by cardio  Order for Tele Monitor? Yes    Additional Lines: Leigh Catheter    4eyes on Skin: yes    Medicines sent: pt on magnesium IV    Any special needs or follow-up needed: lactation    Patient belongings transferred with patient: Yes    Chart send with patient: Yes    Notified: significant other @ bs    Patient reassessed at: 1920 11/20/23  Upon arrival to floor: cardiac monitor applied, patient oriented to room, call bell in reach, and bed in lowest position

## 2023-11-21 NOTE — H&P
[facility] Medicine  History & Physical    Patient Name: Zoran Dubose  MRN: [id number]  Patient Class: IP- Inpatient  Admission Date: 2023  Attending Physician: Marya Spain MD   Primary Care Provider: June Zaragoza MD         Patient information was obtained from patient, past medical records, and primary team.     Subjective:     Principal Problem:Gestational hypertension, third trimester    Chief Complaint:   Chief Complaint   Patient presents with    Vaginal Bleeding        HPI: OB admit:  23-year-old  1 at 39 weeks 1 day gestation with a due date of 2023 which is consistent with an ultrasound that I see in the system at 16 weeks' gestation.  She presents complaining of vaginal spotting.  Upon admission patient was found to have very elevated blood pressures.  Being that she was term gestation we began to evaluate and manage this with hydralazine and hypertension protocol.  Patient was not having any pain complaints on admission but once we admitted her she started to have tightening pain and requested IV pain medicine.  Patient denies any other medical problems besides history of chronic hypertension.  Patient has not seen the doctor in over 6 weeks.    Patient underwent caesarian section today. OB has given multiple doses of IV hydralazine, labetalol, clonidine patch and oral nifedipine. She had an uneventful delivery. She is receiving Mg infusion with plan to stop at 5am tomorrow. Her urine output is adequate. Hospital medicine consulted for uncontrolled HTN despite multiple medications received. D/W primary team and will transfer to stepdown/ICU (pending bed availability) on cardene infusion. The patient is receiving lactation services.     Past Medical History:   Diagnosis Date    Hypertension        History reviewed. No pertinent surgical history.    Review of patient's allergies indicates:  No Known Allergies    No current facility-administered  medications on file prior to encounter.     Current Outpatient Medications on File Prior to Encounter   Medication Sig    prenatal vit/iron fum/folic ac (PRENATAL 1+1 ORAL) Take 1 tablet by mouth once daily.    acetaminophen (TYLENOL) 650 MG TbSR Take 1 tablet (650 mg total) by mouth every 8 (eight) hours.    dexchlorphen-phenylephrine-DM (POLYTUSSIN DM) 1-5-10 mg/5 mL Syrp Take 10 mLs by mouth every 4 (four) hours as needed.    fluticasone propionate (FLONASE) 50 mcg/actuation nasal spray 1 spray (50 mcg total) by Each Nostril route once daily.    ibuprofen (ADVIL,MOTRIN) 600 MG tablet Take 1 tablet (600 mg total) by mouth every 6 (six) hours as needed for Pain.    ondansetron (ZOFRAN) 4 MG tablet Take 1 tablet (4 mg total) by mouth every 6 (six) hours.    promethazine (PHENERGAN) 25 MG tablet Take 1 tablet (25 mg total) by mouth every 6 (six) hours as needed for Nausea.     Family History       Problem Relation (Age of Onset)    Diabetes Maternal Grandmother, Paternal Grandmother    Hypertension Maternal Grandmother, Paternal Grandmother, Paternal Grandfather    No Known Problems Mother    Seizures Father          Tobacco Use    Smoking status: Never    Smokeless tobacco: Not on file   Substance and Sexual Activity    Alcohol use: No    Drug use: Yes     Types: Marijuana    Sexual activity: Never     Review of Systems   Constitutional:  Positive for fatigue.   HENT: Negative.     Eyes:  Negative for visual disturbance.   Respiratory: Negative.     Gastrointestinal:  Positive for abdominal pain. Negative for nausea and vomiting.   Genitourinary: Negative.    Musculoskeletal: Negative.    Skin: Negative.    Neurological:  Positive for light-headedness.   Psychiatric/Behavioral: Negative.       Objective:     Vital Signs (Most Recent):  Temp: 98 °F (36.7 °C) (11/20/23 1209)  Pulse: 71 (11/20/23 1739)  Resp: 18 (11/20/23 1433)  BP: (!) 158/89 (11/20/23 1739)  SpO2: 97 % (11/20/23 1731) Vital Signs (24h Range):  Temp:   [97.8 °F (36.6 °C)-98.7 °F (37.1 °C)] 98 °F (36.7 °C)  Pulse:  [66-94] 71  Resp:  [16-20] 18  SpO2:  [95 %-99 %] 97 %  BP: (133-197)/() 158/89     Weight: 102.1 kg (225 lb)  Body mass index is 42.51 kg/m².     Physical Exam  Constitutional:       Appearance: She is obese. She is not diaphoretic.   HENT:      Head: Normocephalic and atraumatic.      Nose: Nose normal. No congestion.      Mouth/Throat:      Mouth: Mucous membranes are dry.      Pharynx: Oropharynx is clear.   Eyes:      Extraocular Movements: Extraocular movements intact.      Conjunctiva/sclera: Conjunctivae normal.   Cardiovascular:      Rate and Rhythm: Normal rate and regular rhythm.      Pulses: Normal pulses.      Heart sounds: No murmur heard.  Pulmonary:      Effort: Pulmonary effort is normal.      Breath sounds: No wheezing, rhonchi or rales.   Abdominal:      Tenderness: There is abdominal tenderness. There is no guarding.   Genitourinary:     Comments: Leigh in place   Musculoskeletal:      Cervical back: Normal range of motion and neck supple. No rigidity.      Right lower leg: Edema present.      Left lower leg: Edema present.   Skin:     General: Skin is warm and dry.      Capillary Refill: Capillary refill takes less than 2 seconds.   Neurological:      Mental Status: She is oriented to person, place, and time.      Cranial Nerves: No cranial nerve deficit.      Deep Tendon Reflexes: Reflexes abnormal.   Psychiatric:         Mood and Affect: Mood normal.         Behavior: Behavior normal.                Significant Labs: All pertinent labs within the past 24 hours have been reviewed.  Recent Lab Results         11/20/23  1301   11/20/23  0725   11/20/23  0358   11/19/23  2141        Baso #   0.03           Basophil %   0.2           Differential Method   Automated           Eos #   0.0           Eosinophil %   0.0           Gran # (ANC)   15.9           Gran %   87.0           Hematocrit   35.4           Hemoglobin   11.4            Immature Grans (Abs)   0.15  Comment: Mild elevation in immature granulocytes is non specific and   can be seen in a variety of conditions including stress response,   acute inflammation, trauma and pregnancy. Correlation with other   laboratory and clinical findings is essential.             Immature Granulocytes   0.8           Lymph #   1.4           Lymph %   7.8           Magnesium  4.4   5.2  Comment: Critical result MG 5.2 mg/dL called to and read back by Aida Pham  RN/l&E&d at 20-Nov-2023 08:21 by [facility]_[email or web address].  Result Rechecked     5.6  Comment: Critical result MG 5.6 mg/dL called to and read back by Tabitha Alfaro RN  LND at 20-Nov-2023 04:50 by [facility]_[email or web address].     4.2       MCH   24.3           MCHC   32.2           MCV   75           Mono #   0.8           Mono %   4.2           MPV   11.0           nRBC   0           Platelet Count   324           RBC   4.70           RDW   16.4           WBC   18.23                   Significant Imaging: I have reviewed all pertinent imaging results/findings within the past 24 hours.  Assessment/Plan:     * Gestational hypertension, third trimester  Peripartum BP uncontrolled despite several medications given IV and oral  Admit to unit with cardene infusion  BP goal 140-150/80-90  EKG to review for LVH, QT/QRS eval  Cardiac diet - low salt/low fat  Continue oral hydralazine with efforts to wean cardene infusion off  Will need PCP follow up; should be evaluated for SUPA, renin-angiotensin-aldosterone dysregulation, pheochromocytoma, thyroid disease.      VTE Risk Mitigation (From admission, onward)           Ordered     IP VTE LOW RISK PATIENT  Once         11/19/23 [address] [place] [address]  Obstetrics  Postpartum Progress Note    Patient Name: Zoran uDbose  MRN: [id number]  Admission Date: 11/19/2023  Hospital Length of Stay: 1 days  Attending Physician: Marya Spain MD  Primary Care Provider: June Zaragoza  MD Gege    Subjective:     Principal Problem:Gestational hypertension, third trimester    Hospital Course:  No notes on file    Interval History:  Postoperative check    Patient is resting comfortable with Dilaudid.  Blood pressures remain labile and rapidly starting to elevate again.  Last blood pressure was 170/100.  Objective:     Vital Signs (Most Recent):  Temp: 98 °F (36.7 °C) (23 1209)  Pulse: 76 (23 1446)  Resp: 18 (23 1433)  BP: (!) 155/88 (23 1433)  SpO2: 96 % (23 1446) Vital Signs (24h Range):  Temp:  [97.8 °F (36.6 °C)-98.7 °F (37.1 °C)] 98 °F (36.7 °C)  Pulse:  [59-94] 76  Resp:  [16-20] 18  SpO2:  [95 %-99 %] 96 %  BP: (133-197)/() 155/88     Weight: 102.1 kg (225 lb)  Body mass index is 42.51 kg/m².      Intake/Output Summary (Last 24 hours) at 2023 1625  Last data filed at 2023 1422  Gross per 24 hour   Intake 2709.59 ml   Output 4950 ml   Net -2240.41 ml         Significant Labs:  Lab Results   Component Value Date    GROUPTRH A POS 2023    STREPBCULT unknown 2023     Recent Labs   Lab 23  0725   HGB 11.4*   HCT 35.4*   Mag level was 4.4.    I have personallly reviewed all pertinent lab results from the last 24 hours.  And magnesium and A CBC are due around 5 this evening.  Lochia is appropriate   Fundus is firm per the RN.  DTRs are still brisk at 4.4.  Urine output is excellent.  Diuresing tremendously.    Review of Systems  Assessment/Plan:     23 y.o. female  for:    * Gestational hypertension, third trimester  Intrauterine pregnancy at 39 weeks 1 day gestation next time preeclampsia with elevated blood pressures and proteinuria.  Severe at this point.  Magnesium prophylaxis was started.  Patient may be in early labor on her own.  Pitocin augmentation has been started.  Cervix is favorable.  Patient plans to get an epidural.  Unknown group B strep so penicillin is going.  Once patient stabilizes with blood pressures I  will either perform an amniotomy or let her get her epidural depending on how her pain is with her contractions.  CBC and CMP are within normal limits no evidence of thrombocytopenia or elevated liver functions.  Creatinine is also normal.  Proteinuria is present at 2+ on urinalysis.  Pelvis feels adequate for vaginal birth    Patient comfortable with epidural.  Amniotomy performed clear fluid.  Anticipate labor progress.  Magnesium running at 2 g an hour.  Urine output at this point looks excellent.  Penicillin is on board.    Progressing in labor.  On magnesium for prophylaxis for seizures.  Mag level is being drawn right now.  Urine output is starting to decrease a bit.  Has been 100 over the last 2 hours.  Appears very concentrated.  Patient received hydralazine 10 mg for this last elevated blood pressure and that worked well with a blood pressure now of 137/72.  Discussed more of the history.  Patient has had history of chronic hypertension which was diagnosed in her teenage years.  She is never been given a known reason for this.  She denies any history of renal artery stenosis.  And was not on blood pressure medicines in the pregnancy.  Reports early pregnancy blood pressures were systolics in the 140s.    Severe hypertensive.  I consulted Maternal-Fetal Medicine all sure.  Dr. Willy lopez.  Discussed the patient's care plan.  He is now recommending Procardia 10 mg orally every 30 minutes up to 30 mg.  We have discussed that the patient is already on magnesium and he feels that benefits outweigh the risks.  I have discussed the patient remains 8 cm and my concern that she will need a  section.  We have discussed if this does not work would proceed with  section secondary to uncontrolled hypertensive crisis and protracted labor process at this point.  Repeating a stat CBC now.  Last Mag level was 5.6.  Magnesium turned down to 1 gram/hour.  Urine output approximately 50 an hour.    Patient  received 10 mg of Procardia p.o..  30 minutes later her blood pressure is actually highest that it has been systolic 190 diastolic 107.  Will proceed with  section.  Spoke with patient understands risks especially hemorrhage with maneuvers and hysterectomy.  We will see what her blood pressures do post delivery and see if she needs to go to the ICU for any sort of blood pressure management    Postoperative check.  Patient will be transferred to either ICU step-down hospitalist has been consulted as blood pressures are trending up.  Patient is still on magnesium and we plan to discontinue that at 5:00 a.m..  Hospitalist is going to consider a drip option or others.        Disposition:  Guarded with blood pressures    Marya Spain MD  Obstetrics  [facility]           Ml Walsh MD  Department of Hospital Medicine  [facility]    COMPLETED  Family history is reviewed and has not changed   Pertinent information:

## 2023-11-21 NOTE — ASSESSMENT & PLAN NOTE
Peripartum BP uncontrolled despite several medications given IV and oral  Admit to unit with cardene infusion  BP goal 140-150/80-90  EKG to review for LVH, QT/QRS eval  Cardiac diet - low salt/low fat  Continue oral hydralazine with efforts to wean cardene infusion off  Will need PCP follow up; should be evaluated for SUPA, renin-angiotensin-aldosterone dysregulation, pheochromocytoma, thyroid disease.

## 2023-11-21 NOTE — PLAN OF CARE
11/21/23 0811   Patient Assessment/Suction   Level of Consciousness (AVPU) alert   Respiratory Effort Unlabored   PRE-TX-O2   Device (Oxygen Therapy) room air   SpO2 97 %   Pulse Oximetry Type Continuous   $ Pulse Oximetry - Multiple Charge Pulse Oximetry - Multiple   Pulse (!) 113   Resp 18   Respiratory Evaluation   $ Care Plan Tech Time 15 min

## 2023-11-22 PROCEDURE — 25000003 PHARM REV CODE 250: Performed by: OBSTETRICS & GYNECOLOGY

## 2023-11-22 PROCEDURE — 25000003 PHARM REV CODE 250: Performed by: HOSPITALIST

## 2023-11-22 PROCEDURE — 25000003 PHARM REV CODE 250: Performed by: SPECIALIST

## 2023-11-22 PROCEDURE — 12000002 HC ACUTE/MED SURGE SEMI-PRIVATE ROOM

## 2023-11-22 RX ADMIN — NIFEDIPINE 30 MG: 30 TABLET, FILM COATED, EXTENDED RELEASE ORAL at 09:11

## 2023-11-22 RX ADMIN — OXYCODONE HYDROCHLORIDE AND ACETAMINOPHEN 1 TABLET: 10; 325 TABLET ORAL at 10:11

## 2023-11-22 RX ADMIN — DOCUSATE SODIUM 200 MG: 100 CAPSULE, LIQUID FILLED ORAL at 09:11

## 2023-11-22 RX ADMIN — SIMETHICONE 80 MG: 80 TABLET, CHEWABLE ORAL at 06:11

## 2023-11-22 RX ADMIN — IBUPROFEN 600 MG: 200 TABLET, FILM COATED ORAL at 10:11

## 2023-11-22 RX ADMIN — HYDRALAZINE HYDROCHLORIDE 50 MG: 25 TABLET ORAL at 02:11

## 2023-11-22 RX ADMIN — IBUPROFEN 600 MG: 200 TABLET, FILM COATED ORAL at 09:11

## 2023-11-22 RX ADMIN — HYDRALAZINE HYDROCHLORIDE 50 MG: 25 TABLET ORAL at 06:11

## 2023-11-22 RX ADMIN — OXYCODONE HYDROCHLORIDE AND ACETAMINOPHEN 1 TABLET: 10; 325 TABLET ORAL at 09:11

## 2023-11-22 RX ADMIN — HYDRALAZINE HYDROCHLORIDE 50 MG: 25 TABLET ORAL at 09:11

## 2023-11-22 RX ADMIN — SENNOSIDES AND DOCUSATE SODIUM 1 TABLET: 50; 8.6 TABLET ORAL at 03:11

## 2023-11-22 RX ADMIN — OXYCODONE HYDROCHLORIDE AND ACETAMINOPHEN 1 TABLET: 10; 325 TABLET ORAL at 03:11

## 2023-11-22 RX ADMIN — SIMETHICONE 80 MG: 80 TABLET, CHEWABLE ORAL at 09:11

## 2023-11-22 NOTE — PROGRESS NOTES
[facility] Medicine  Progress Note    Patient Name: Zoran Dubose  MRN: [id number]  Patient Class: IP- Inpatient   Admission Date: 2023  Length of Stay: 2 days  Attending Physician: Ml Walsh MD  Primary Care Provider: June Zaragoza MD        Subjective:     Principal Problem:Gestational hypertension, third trimester        HPI:  OB admit:  23-year-old  1 at 39 weeks 1 day gestation with a due date of 2023 which is consistent with an ultrasound that I see in the system at 16 weeks' gestation.  She presents complaining of vaginal spotting.  Upon admission patient was found to have very elevated blood pressures.  Being that she was term gestation we began to evaluate and manage this with hydralazine and hypertension protocol.  Patient was not having any pain complaints on admission but once we admitted her she started to have tightening pain and requested IV pain medicine.  Patient denies any other medical problems besides history of chronic hypertension.  Patient has not seen the doctor in over 6 weeks.    Patient underwent caesarian section today. OB has given multiple doses of IV hydralazine, labetalol, clonidine patch and oral nifedipine. She had an uneventful delivery. She is receiving Mg infusion with plan to stop at 5am tomorrow. Her urine output is adequate. Hospital medicine consulted for uncontrolled HTN despite multiple medications received. D/W primary team and will transfer to stepdown/ICU (pending bed availability) on cardene infusion. The patient is receiving lactation services.     Overview/Hospital Course:  Hospital medicine consulted for uncontrolled postpartum HTN. After receiving several BP meds, she was still running elevated BPs in 160-170/100s. She was transferred to stepdown unit on cardene infusion. Nifedipine and hydralazine added orally while weaning off cardene infusion. Persistent BP goal postpartum is SBP equal to or less than  140. DP <90.  She is ambulating without difficulty.. Reese daley'd this morning. Ok to transfer to postpartum.     Interval History: no acute complaints     Review of Systems   Constitutional: Negative.    HENT: Negative.     Respiratory: Negative.     Cardiovascular: Negative.    Gastrointestinal: Negative.    Genitourinary: Negative.    Musculoskeletal: Negative.    Neurological: Negative.      Objective:     Vital Signs (Most Recent):  Temp: 97.3 °F (36.3 °C) (11/21/23 1900)  Pulse: 92 (11/21/23 1800)  Resp: 19 (11/21/23 1800)  BP: (!) 157/91 (11/21/23 1800)  SpO2: 96 % (11/21/23 1600) Vital Signs (24h Range):  Temp:  [97.3 °F (36.3 °C)-100 °F (37.8 °C)] 97.3 °F (36.3 °C)  Pulse:  [] 92  Resp:  [17-22] 19  SpO2:  [94 %-97 %] 96 %  BP: (128-160)/() 157/91     Weight: 102.1 kg (225 lb)  Body mass index is 42.51 kg/m².    Intake/Output Summary (Last 24 hours) at 11/21/2023 1955  Last data filed at 11/21/2023 1800  Gross per 24 hour   Intake 4005.21 ml   Output 8000 ml   Net -3994.79 ml         Physical Exam  Constitutional:       General: She is not in acute distress.     Appearance: Normal appearance. She is obese.   Eyes:      Extraocular Movements: Extraocular movements intact.      Pupils: Pupils are equal, round, and reactive to light.   Cardiovascular:      Rate and Rhythm: Normal rate and regular rhythm.      Pulses: Normal pulses.   Pulmonary:      Effort: Pulmonary effort is normal.      Breath sounds: Normal breath sounds. No wheezing or rhonchi.   Abdominal:      General: Bowel sounds are normal.      Tenderness: There is abdominal tenderness. There is guarding.   Musculoskeletal:      Right lower leg: Edema present.      Left lower leg: Edema present.   Skin:     General: Skin is warm and dry.      Capillary Refill: Capillary refill takes less than 2 seconds.   Neurological:      General: No focal deficit present.      Mental Status: She is alert and oriented to person, place, and time. Mental  status is at baseline.   Psychiatric:         Mood and Affect: Mood normal.         Behavior: Behavior normal.             Significant Labs: All pertinent labs within the past 24 hours have been reviewed.  Recent Lab Results         11/21/23  0754   11/21/23  0215        Albumin   3.1       ALP   121       ALT   9       Anion Gap   9       AST   19       Baso #   0.02       Basophil %   0.1       BILIRUBIN TOTAL   0.2  Comment: For infants and newborns, interpretation of results should be based  on gestational age, weight and in agreement with clinical  observations.    Premature Infant recommended reference ranges:  Up to 24 hours.............<8.0 mg/dL  Up to 48 hours............<12.0 mg/dL  3-5 days..................<15.0 mg/dL  6-29 days.................<15.0 mg/dL         BUN   5       Calcium   8.0       Chloride   103       CO2   21       Creatinine   0.7       Differential Method   Automated       eGFR   >60.0       Eos #   0.0       Eosinophil %   0.1       Glucose   99       Gran # (ANC)   16.5       Gran %   87.7       Hematocrit   34.6       Hemoglobin   11.1       Immature Grans (Abs)   0.07  Comment: Mild elevation in immature granulocytes is non specific and   can be seen in a variety of conditions including stress response,   acute inflammation, trauma and pregnancy. Correlation with other   laboratory and clinical findings is essential.         Immature Granulocytes   0.4       Lymph #   1.2       Lymph %   6.3       Magnesium  4.0   4.0       MCH   24.2       MCHC   32.1       MCV   75       Mono #   1.0       Mono %   5.4       MPV   10.5       nRBC   0       Platelet Count   320       Potassium   4.0       PROTEIN TOTAL   6.2       RBC   4.59       RDW   16.4       Sodium   133       WBC   18.80               Significant Imaging: I have reviewed all pertinent imaging results/findings within the past 24 hours.    Assessment/Plan:      * Gestational hypertension, third trimester  Peripartum BP  uncontrolled despite several medications given IV and oral  Admit to unit with cardene infusion  BP goal 140-150/80-90  EKG to review for LVH, QT/QRS eval  Cardiac diet - low salt/low fat  Continue oral hydralazine with efforts to wean cardene infusion off  Will need PCP follow up; should be evaluated for SUPA, renin-angiotensin-aldosterone dysregulation, pheochromocytoma, thyroid disease.    Recommend on dc home: hydralazine 50mg TID, Nifedipine 30mg daily.       VTE Risk Mitigation (From admission, onward)           Ordered     IP VTE LOW RISK PATIENT  Once         11/19/23 1618                    Discharge Planning   GALILEA: 11/22/2023     Code Status: Full Code   Is the patient medically ready for discharge?:     Reason for patient still in hospital (select all that apply): Pending disposition  Discharge Plan A: Home with family                  Ml Walsh MD  Department of Hospital Medicine   [facility]

## 2023-11-22 NOTE — HOSPITAL COURSE
Hospital medicine consulted for uncontrolled postpartum HTN. After receiving several BP meds, she was still running elevated BPs in 160-170/100s. She was transferred to stepdown unit on cardene infusion. Nifedipine and hydralazine added orally while weaning off cardene infusion. Persistent BP goal postpartum is SBP equal to or less than 140. DP <90.  She is ambulating without difficulty.. Reese daley'rocael this morning. Ok to transfer to postpartum.

## 2023-11-22 NOTE — LACTATION NOTE
This note was copied from a baby's chart.     11/22/23 2514   Maternal Assessment   Breast Size Issue none   Breast Shape round   Breast Density soft   Areola elastic   Nipples everted   Maternal Infant Feeding   Maternal Emotional State assist needed   Infant Positioning clutch/football   Signs of Milk Transfer audible swallow   Latch Assistance yes     Mom currently feeding baby a bottle right now. Mom desires to breastfeed. Assisted with with position & latch. Instructed mom to compress breast for a deeper latch on. Good nutritive sucking & swallowing noted. Instructed on proper latch to facilitate effective breastfeeding.  Discussed recognizing hunger cues, appropriate positioning and wide mouth latch.  Discussed ways to determine an effective latch including:  areola included in latch, rhythmic/nutritive sucking and audible swallowing.  Instructed mom to always offer breast 1st prior to supplementing with formula. Instructed mom to call for lactation assistance @ the next feeding.  Mom states understanding and verbalized appropriate recall.

## 2023-11-22 NOTE — SUBJECTIVE & OBJECTIVE
Interval History:  Postoperative day 2 primary  section severe preeclampsia superimposed on chronic hypertension    She is doing well this morning. She is tolerating a regular diet without nausea or vomiting. She is voiding spontaneously. She is ambulating. She has not passed flatus, and has not a BM. Vaginal bleeding is mild. She denies fever or chills. Abdominal pain is mild and controlled with oral medications. She Is breastfeeding.    Objective:     Vital Signs (Most Recent):  Temp: 98.2 °F (36.8 °C) (23 032)  Pulse: 94 (23)  Resp: 18 (23)  BP: (!) 149/93 (23)  SpO2: 97 % (23) Vital Signs (24h Range):  Temp:  [97.3 °F (36.3 °C)-100 °F (37.8 °C)] 98.2 °F (36.8 °C)  Pulse:  [] 94  Resp:  [18-20] 18  SpO2:  [96 %-100 %] 97 %  BP: (128-157)/(62-96) 149/93     Weight: 102.1 kg (225 lb)  Body mass index is 42.51 kg/m².      Intake/Output Summary (Last 24 hours) at 2023 08  Last data filed at 2023  Gross per 24 hour   Intake 520 ml   Output 3900 ml   Net -3380 ml         Significant Labs:  Lab Results   Component Value Date    GROUPTRH A POS 2023    STREPBCULT unknown 2023     Recent Labs   Lab 23   HGB 11.1*   HCT 34.6*       CBC:   Recent Labs   Lab 23   WBC 18.80*   RBC 4.59   HGB 11.1*   HCT 34.6*      MCV 75*   MCH 24.2*   MCHC 32.1     I have personallly reviewed all pertinent lab results from the last 24 hours.    Physical Exam  Patient looks a lot better appears to have adequate pain control  Transferred from step-down unit to postpartum last night  Blood pressure this morning 149/93 on 2 medications   Abdomen nondistended  Bowel sounds hypoactive  Extremities no Homans sign  Review of Systems

## 2023-11-22 NOTE — PROGRESS NOTES
[facility] Medicine  Progress Note    Patient name: Zoran Dubose  MRN: [id number]  Admit Date: 2023   LOS: 3 days     SUBJECTIVE:     Principal problem: Gestational hypertension, third trimester    Interval History:  Feels well today- visiting with family and baby. No complaints today.  No headache, blurry vision, CP, SOB, etc.     Hospital Course:    3-year-old  1 at 39 weeks 1 day gestation with a due date of 2023 presented with vaginal spotting and elevated blood pressures. She underwent caesarian section with an uneventful delivery. She required multiple anti hypertensives to help control her blood pressure. Hospital medicine consulted for uncontrolled postpartum HTN. After receiving several BP meds, she was still running elevated BPs in 160-170/100s. She was transferred to stepdown unit on cardene infusion. Nifedipine and hydralazine added orally while weaning off cardene infusion. Persistent BP goal postpartum is SBP equal to or less than 140. DP <90.  She is ambulating without difficulty.. Reese steiner. Transferred to the postpartum unit.  Blood pressures have remained controlled.        Scheduled Meds:   docusate sodium  200 mg Oral BID    hydrALAZINE  50 mg Oral Q8H    mupirocin   Nasal BID    NIFEdipine  30 mg Oral Daily    oxytocin in lactated ringers  95 merrick-units/min Intravenous Once     Continuous Infusions:  PRN Meds:acetaminophen, calcium carbonate, calcium gluconate, carboprost, diphenhydrAMINE, diphenoxylate-atropine 2.5-0.025 mg, HYDROmorphone, ibuprofen, lanolin, measles, mumps and rubella vaccine, miSOPROStoL, NIFEdipine, ondansetron, ondansetron, oxyCODONE-acetaminophen, oxyCODONE-acetaminophen, oxytocin in lactated ringers, oxytocin in lactated ringers, oxytocin, senna-docusate 8.6-50 mg, simethicone, DIPH,PERTUSS(ACELL),TET VACCINE (ADULT)(BOOSTRIX,ADACEL), tranexamic acid (CYKLOKAPRON) infusion, tranexamic acid (CYKLOKAPRON) infusion    Review of  patient's allergies indicates:  No Known Allergies    Review of Systems: As per interval history    OBJECTIVE:     Vital Signs (Most Recent)  Temp: 98.3 °F (36.8 °C) (11/22/23 1135)  Pulse: 101 (11/22/23 1135)  Resp: 18 (11/22/23 1135)  BP: 138/80 (11/22/23 1135)  SpO2: 96 % (11/22/23 1135)    Vital Signs Range (Last 24H):  Temp:  [97.3 °F (36.3 °C)-98.5 °F (36.9 °C)]   Pulse:  []   Resp:  [18-20]   BP: (101-157)/(80-96)   SpO2:  [95 %-100 %]     I & O (Last 24H):  Intake/Output Summary (Last 24 hours) at 11/22/2023 1552  Last data filed at 11/21/2023 2030  Gross per 24 hour   Intake 520 ml   Output 2300 ml   Net -1780 ml       Physical Exam:    Vitals and nursing note reviewed.     Constitutional:       General: Not in acute distress.     Appearance: Well-developed.   HENT:      Head: Normocephalic and atraumatic.   Eyes:      Pupils: Pupils are equal, round, and reactive to light.   Cardiovascular:     Pulmonary:      Effort: Pulmonary effort is normal.        Abdominal:      General: There is no distension.     Musculoskeletal:         General: Normal range of motion.      Cervical back: Normal range of motion.   Skin:     Findings: No rash.   Neurological:      Mental Status: Alert and oriented to person, place, and time.      Cranial Nerves: No cranial nerve deficit.      Sensory: No sensory deficit.     Laboratory:  I have reviewed all pertinent lab results within the past 24 hours.  CBC:   Recent Labs   Lab 11/21/23 0215   WBC 18.80*   RBC 4.59   HGB 11.1*   HCT 34.6*      MCV 75*   MCH 24.2*   MCHC 32.1     CMP:   Recent Labs   Lab 11/21/23 0215   GLU 99   CALCIUM 8.0*   ALBUMIN 3.1*   PROT 6.2   *   K 4.0   CO2 21*      BUN 5*   CREATININE 0.7   ALKPHOS 121   ALT 9*   AST 19   BILITOT 0.2       Diagnostic Results:      ASSESSMENT/PLAN:         Active Hospital Problems    Diagnosis  POA    *Gestational hypertension, third trimester [O13.3]  Yes      Resolved Hospital Problems   No  resolved problems to display.         Plan:     * Gestational hypertension, third trimester  Peripartum BP uncontrolled despite several medications given IV and oral  Required cardene infusion, now weaned off  BP goal 140-150/80-90  EKG to review for LVH, QT/QRS eval  Cardiac diet - low salt/low fat  Hydralazine, Nifedipine  Will need PCP follow up; should be evaluated for SUPA, renin-angiotensin-aldosterone dysregulation, pheochromocytoma, thyroid disease.     Recommend on dc home: hydralazine 50mg TID, Nifedipine 30mg daily.       VTE Risk Mitigation (From admission, onward)           Ordered     IP VTE LOW RISK PATIENT  Once         11/19/23 1618                      Department Hospital Medicine  [facility]  Maty Jernigan MD  Date of service: 11/22/2023

## 2023-11-22 NOTE — SUBJECTIVE & OBJECTIVE
Interval History: no acute complaints     Review of Systems   Constitutional: Negative.    HENT: Negative.     Respiratory: Negative.     Cardiovascular: Negative.    Gastrointestinal: Negative.    Genitourinary: Negative.    Musculoskeletal: Negative.    Neurological: Negative.      Objective:     Vital Signs (Most Recent):  Temp: 97.3 °F (36.3 °C) (11/21/23 1900)  Pulse: 92 (11/21/23 1800)  Resp: 19 (11/21/23 1800)  BP: (!) 157/91 (11/21/23 1800)  SpO2: 96 % (11/21/23 1600) Vital Signs (24h Range):  Temp:  [97.3 °F (36.3 °C)-100 °F (37.8 °C)] 97.3 °F (36.3 °C)  Pulse:  [] 92  Resp:  [17-22] 19  SpO2:  [94 %-97 %] 96 %  BP: (128-160)/() 157/91     Weight: 102.1 kg (225 lb)  Body mass index is 42.51 kg/m².    Intake/Output Summary (Last 24 hours) at 11/21/2023 1955  Last data filed at 11/21/2023 1800  Gross per 24 hour   Intake 4005.21 ml   Output 8000 ml   Net -3994.79 ml         Physical Exam  Constitutional:       General: She is not in acute distress.     Appearance: Normal appearance. She is obese.   Eyes:      Extraocular Movements: Extraocular movements intact.      Pupils: Pupils are equal, round, and reactive to light.   Cardiovascular:      Rate and Rhythm: Normal rate and regular rhythm.      Pulses: Normal pulses.   Pulmonary:      Effort: Pulmonary effort is normal.      Breath sounds: Normal breath sounds. No wheezing or rhonchi.   Abdominal:      General: Bowel sounds are normal.      Tenderness: There is abdominal tenderness. There is guarding.   Musculoskeletal:      Right lower leg: Edema present.      Left lower leg: Edema present.   Skin:     General: Skin is warm and dry.      Capillary Refill: Capillary refill takes less than 2 seconds.   Neurological:      General: No focal deficit present.      Mental Status: She is alert and oriented to person, place, and time. Mental status is at baseline.   Psychiatric:         Mood and Affect: Mood normal.         Behavior: Behavior normal.              Significant Labs: All pertinent labs within the past 24 hours have been reviewed.  Recent Lab Results         11/21/23  0754   11/21/23  0215        Albumin   3.1       ALP   121       ALT   9       Anion Gap   9       AST   19       Baso #   0.02       Basophil %   0.1       BILIRUBIN TOTAL   0.2  Comment: For infants and newborns, interpretation of results should be based  on gestational age, weight and in agreement with clinical  observations.    Premature Infant recommended reference ranges:  Up to 24 hours.............<8.0 mg/dL  Up to 48 hours............<12.0 mg/dL  3-5 days..................<15.0 mg/dL  6-29 days.................<15.0 mg/dL         BUN   5       Calcium   8.0       Chloride   103       CO2   21       Creatinine   0.7       Differential Method   Automated       eGFR   >60.0       Eos #   0.0       Eosinophil %   0.1       Glucose   99       Gran # (ANC)   16.5       Gran %   87.7       Hematocrit   34.6       Hemoglobin   11.1       Immature Grans (Abs)   0.07  Comment: Mild elevation in immature granulocytes is non specific and   can be seen in a variety of conditions including stress response,   acute inflammation, trauma and pregnancy. Correlation with other   laboratory and clinical findings is essential.         Immature Granulocytes   0.4       Lymph #   1.2       Lymph %   6.3       Magnesium  4.0   4.0       MCH   24.2       MCHC   32.1       MCV   75       Mono #   1.0       Mono %   5.4       MPV   10.5       nRBC   0       Platelet Count   320       Potassium   4.0       PROTEIN TOTAL   6.2       RBC   4.59       RDW   16.4       Sodium   133       WBC   18.80               Significant Imaging: I have reviewed all pertinent imaging results/findings within the past 24 hours.

## 2023-11-22 NOTE — PLAN OF CARE
Problem: Adult Inpatient Plan of Care  Goal: Plan of Care Review  Outcome: Ongoing, Progressing  Goal: Patient-Specific Goal (Individualized)  Outcome: Ongoing, Progressing  Goal: Absence of Hospital-Acquired Illness or Injury  Outcome: Ongoing, Progressing  Goal: Optimal Comfort and Wellbeing  Outcome: Ongoing, Progressing  Goal: Readiness for Transition of Care  Outcome: Ongoing, Progressing     Problem:  Fall Injury Risk  Goal: Absence of Fall, Infant Drop and Related Injury  Outcome: Ongoing, Progressing     Problem: Delayed Labor Progression (Labor)  Goal: Effective Progression to Delivery  Outcome: Ongoing, Progressing     Problem: Infection (Labor)  Goal: Absence of Infection Signs and Symptoms  Outcome: Ongoing, Progressing     Problem: Labor Pain (Labor)  Goal: Acceptable Pain Control  Outcome: Ongoing, Progressing

## 2023-11-22 NOTE — LACTATION NOTE
Bedside shift change report given to [address] (oncoming nurse) by Bartolo Vargas RN 
 (offgoing nurse). Report included the following information SBAR. Mother at baby's bedside in wheelchair. Reviewed NICU pumping instructions and emphasized importance of pumping at least 8 times in 24 hours to stimulate adequate milk production. Encouraged to call me for any assistance with pumping. Patient verbalizes understanding of all instructions with good recall.

## 2023-11-22 NOTE — PROGRESS NOTES
[facility]  Obstetrics  Postpartum Progress Note    Patient Name: Zoran Dubose  MRN: [id number]  Admission Date: 2023  Hospital Length of Stay: 3 days  Attending Physician: Marya Spain MD  Primary Care Provider: June Zaragoza MD    Subjective:     Principal Problem:Gestational hypertension, third trimester    Hospital Course:  No notes on file    Interval History:  Postoperative day 2 primary  section severe preeclampsia superimposed on chronic hypertension    She is doing well this morning. She is tolerating a regular diet without nausea or vomiting. She is voiding spontaneously. She is ambulating. She has not passed flatus, and has not a BM. Vaginal bleeding is mild. She denies fever or chills. Abdominal pain is mild and controlled with oral medications. She Is breastfeeding.    Objective:     Vital Signs (Most Recent):  Temp: 98.2 °F (36.8 °C) (23)  Pulse: 94 (23)  Resp: 18 (23)  BP: (!) 149/93 (23)  SpO2: 97 % (23) Vital Signs (24h Range):  Temp:  [97.3 °F (36.3 °C)-100 °F (37.8 °C)] 98.2 °F (36.8 °C)  Pulse:  [] 94  Resp:  [18-20] 18  SpO2:  [96 %-100 %] 97 %  BP: (128-157)/(62-96) 149/93     Weight: 102.1 kg (225 lb)  Body mass index is 42.51 kg/m².      Intake/Output Summary (Last 24 hours) at 2023 08  Last data filed at 2023  Gross per 24 hour   Intake 520 ml   Output 3900 ml   Net -3380 ml         Significant Labs:  Lab Results   Component Value Date    GROUPTRH A POS 2023    STREPBCULT unknown 2023     Recent Labs   Lab 23   HGB 11.1*   HCT 34.6*       CBC:   Recent Labs   Lab 23   WBC 18.80*   RBC 4.59   HGB 11.1*   HCT 34.6*      MCV 75*   MCH 24.2*   MCHC 32.1     I have personallly reviewed all pertinent lab results from the last 24 hours.    Physical Exam  Patient looks a lot better appears to have adequate pain control  Transferred from  step-down unit to postpartum last night  Blood pressure this morning 149/93 on 2 medications   Abdomen nondistended  Bowel sounds hypoactive  Extremities no Homans sign  Review of Systems  Assessment/Plan:     23 y.o. female  for:    * Gestational hypertension, third trimester  Intrauterine pregnancy at 39 weeks 1 day gestation next time preeclampsia with elevated blood pressures and proteinuria.  Severe at this point.  Magnesium prophylaxis was started.  Patient may be in early labor on her own.  Pitocin augmentation has been started.  Cervix is favorable.  Patient plans to get an epidural.  Unknown group B strep so penicillin is going.  Once patient stabilizes with blood pressures I will either perform an amniotomy or let her get her epidural depending on how her pain is with her contractions.  CBC and CMP are within normal limits no evidence of thrombocytopenia or elevated liver functions.  Creatinine is also normal.  Proteinuria is present at 2+ on urinalysis.  Pelvis feels adequate for vaginal birth    Patient comfortable with epidural.  Amniotomy performed clear fluid.  Anticipate labor progress.  Magnesium running at 2 g an hour.  Urine output at this point looks excellent.  Penicillin is on board.    Progressing in labor.  On magnesium for prophylaxis for seizures.  Mag level is being drawn right now.  Urine output is starting to decrease a bit.  Has been 100 over the last 2 hours.  Appears very concentrated.  Patient received hydralazine 10 mg for this last elevated blood pressure and that worked well with a blood pressure now of 137/72.  Discussed more of the history.  Patient has had history of chronic hypertension which was diagnosed in her teenage years.  She is never been given a known reason for this.  She denies any history of renal artery stenosis.  And was not on blood pressure medicines in the pregnancy.  Reports early pregnancy blood pressures were systolics in the 140s.    Severe  hypertensive.  I consulted Maternal-Fetal Medicine all sure.  Dr. Willy lopez.  Discussed the patient's care plan.  He is now recommending Procardia 10 mg orally every 30 minutes up to 30 mg.  We have discussed that the patient is already on magnesium and he feels that benefits outweigh the risks.  I have discussed the patient remains 8 cm and my concern that she will need a  section.  We have discussed if this does not work would proceed with  section secondary to uncontrolled hypertensive crisis and protracted labor process at this point.  Repeating a stat CBC now.  Last Mag level was 5.6.  Magnesium turned down to 1 gram/hour.  Urine output approximately 50 an hour.    Patient received 10 mg of Procardia p.o..  30 minutes later her blood pressure is actually highest that it has been systolic 190 diastolic 107.  Will proceed with  section.  Spoke with patient understands risks especially hemorrhage with maneuvers and hysterectomy.  We will see what her blood pressures do post delivery and see if she needs to go to the ICU for any sort of blood pressure management    Postoperative check.  Patient will be transferred to either ICU step-down hospitalist has been consulted as blood pressures are trending up.  Patient is still on magnesium and we plan to discontinue that at 5:00 a.m..  Hospitalist is going to consider a drip option or others.    Postoperative day 1 primary  section uncontrolled hypertension.  Patient now is on a step-down unit on a hypertensive drip.  Appreciate the help with hypertensive medications.  Patient is interested in breastfeeding and is pumping.  So care with medications that could be used during breastfeeding.  We have a breast feeding consult nurse here that can be consulted regarding any concerns with medications.  Patient needs Leigh out and needs to begin to ambulate.  Magnesium has just been turned off.  And we will start oral medication and discontinued  the PCA pump    Postoperative day 2 severe preeclampsia chronic hypertension on 2 medications now we will continue the patient in the hospital possible discharge home in the next day or 2 patient needs to follow up with her own physician in the next week or 2 for dressing removal and postpartum management.  Has not passed flatus yet encouraging ambulation may have to try Dulcolax suppository later today.  Patient has been taking Colace and Mylicon        Disposition: As patient meets milestones, will plan to discharge 1-2 day.    Marya Spain MD  Obstetrics  [facility]

## 2023-11-22 NOTE — ASSESSMENT & PLAN NOTE
Intrauterine pregnancy at 39 weeks 1 day gestation next time preeclampsia with elevated blood pressures and proteinuria.  Severe at this point.  Magnesium prophylaxis was started.  Patient may be in early labor on her own.  Pitocin augmentation has been started.  Cervix is favorable.  Patient plans to get an epidural.  Unknown group B strep so penicillin is going.  Once patient stabilizes with blood pressures I will either perform an amniotomy or let her get her epidural depending on how her pain is with her contractions.  CBC and CMP are within normal limits no evidence of thrombocytopenia or elevated liver functions.  Creatinine is also normal.  Proteinuria is present at 2+ on urinalysis.  Pelvis feels adequate for vaginal birth    Patient comfortable with epidural.  Amniotomy performed clear fluid.  Anticipate labor progress.  Magnesium running at 2 g an hour.  Urine output at this point looks excellent.  Penicillin is on board.    Progressing in labor.  On magnesium for prophylaxis for seizures.  Mag level is being drawn right now.  Urine output is starting to decrease a bit.  Has been 100 over the last 2 hours.  Appears very concentrated.  Patient received hydralazine 10 mg for this last elevated blood pressure and that worked well with a blood pressure now of 137/72.  Discussed more of the history.  Patient has had history of chronic hypertension which was diagnosed in her teenage years.  She is never been given a known reason for this.  She denies any history of renal artery stenosis.  And was not on blood pressure medicines in the pregnancy.  Reports early pregnancy blood pressures were systolics in the 140s.    Severe hypertensive.  I consulted Maternal-Fetal Medicine all sure.  Dr. Willy lopez.  Discussed the patient's care plan.  He is now recommending Procardia 10 mg orally every 30 minutes up to 30 mg.  We have discussed that the patient is already on magnesium and he feels that benefits outweigh the  risks.  I have discussed the patient remains 8 cm and my concern that she will need a  section.  We have discussed if this does not work would proceed with  section secondary to uncontrolled hypertensive crisis and protracted labor process at this point.  Repeating a stat CBC now.  Last Mag level was 5.6.  Magnesium turned down to 1 gram/hour.  Urine output approximately 50 an hour.    Patient received 10 mg of Procardia p.o..  30 minutes later her blood pressure is actually highest that it has been systolic 190 diastolic 107.  Will proceed with  section.  Spoke with patient understands risks especially hemorrhage with maneuvers and hysterectomy.  We will see what her blood pressures do post delivery and see if she needs to go to the ICU for any sort of blood pressure management    Postoperative check.  Patient will be transferred to either ICU step-down hospitalist has been consulted as blood pressures are trending up.  Patient is still on magnesium and we plan to discontinue that at 5:00 a.m..  Hospitalist is going to consider a drip option or others.    Postoperative day 1 primary  section uncontrolled hypertension.  Patient now is on a step-down unit on a hypertensive drip.  Appreciate the help with hypertensive medications.  Patient is interested in breastfeeding and is pumping.  So care with medications that could be used during breastfeeding.  We have a breast feeding consult nurse here that can be consulted regarding any concerns with medications.  Patient needs Leigh out and needs to begin to ambulate.  Magnesium has just been turned off.  And we will start oral medication and discontinued the PCA pump    Postoperative day 2 severe preeclampsia chronic hypertension on 2 medications now we will continue the patient in the hospital possible discharge home in the next day or 2 patient needs to follow up with her own physician in the next week or 2 for dressing removal and  postpartum management.  Has not passed flatus yet encouraging ambulation may have to try Dulcolax suppository later today.  Patient has been taking Colace and Mylicon

## 2023-11-22 NOTE — ASSESSMENT & PLAN NOTE
Peripartum BP uncontrolled despite several medications given IV and oral  Admit to unit with cardene infusion  BP goal 140-150/80-90  EKG to review for LVH, QT/QRS eval  Cardiac diet - low salt/low fat  Continue oral hydralazine with efforts to wean cardene infusion off  Will need PCP follow up; should be evaluated for SUPA, renin-angiotensin-aldosterone dysregulation, pheochromocytoma, thyroid disease.    Recommend on dc home: hydralazine 50mg TID, Nifedipine 30mg daily.

## 2023-11-23 VITALS
RESPIRATION RATE: 18 BRPM | HEART RATE: 88 BPM | DIASTOLIC BLOOD PRESSURE: 92 MMHG | SYSTOLIC BLOOD PRESSURE: 138 MMHG | BODY MASS INDEX: 42.48 KG/M2 | OXYGEN SATURATION: 96 % | WEIGHT: 225 LBS | HEIGHT: 61 IN | TEMPERATURE: 98 F

## 2023-11-23 PROCEDURE — 25000003 PHARM REV CODE 250: Performed by: HOSPITALIST

## 2023-11-23 PROCEDURE — 25000003 PHARM REV CODE 250: Performed by: OBSTETRICS & GYNECOLOGY

## 2023-11-23 PROCEDURE — 25000003 PHARM REV CODE 250: Performed by: SPECIALIST

## 2023-11-23 RX ORDER — NIFEDIPINE 30 MG/1
30 TABLET, EXTENDED RELEASE ORAL DAILY
Qty: 30 TABLET | Refills: 11 | Status: SHIPPED | OUTPATIENT
Start: 2023-11-23 | End: 2024-11-22

## 2023-11-23 RX ORDER — HYDRALAZINE HYDROCHLORIDE 50 MG/1
50 TABLET, FILM COATED ORAL EVERY 8 HOURS
Qty: 90 TABLET | Refills: 11 | Status: SHIPPED | OUTPATIENT
Start: 2023-11-23 | End: 2024-11-22

## 2023-11-23 RX ORDER — OXYCODONE AND ACETAMINOPHEN 5; 325 MG/1; MG/1
1 TABLET ORAL EVERY 4 HOURS PRN
Qty: 28 TABLET | Refills: 0 | Status: SHIPPED | OUTPATIENT
Start: 2023-11-23

## 2023-11-23 RX ADMIN — SIMETHICONE 80 MG: 80 TABLET, CHEWABLE ORAL at 04:11

## 2023-11-23 RX ADMIN — IBUPROFEN 600 MG: 200 TABLET, FILM COATED ORAL at 04:11

## 2023-11-23 RX ADMIN — NIFEDIPINE 30 MG: 30 TABLET, FILM COATED, EXTENDED RELEASE ORAL at 08:11

## 2023-11-23 RX ADMIN — HYDRALAZINE HYDROCHLORIDE 50 MG: 25 TABLET ORAL at 02:11

## 2023-11-23 RX ADMIN — HYDRALAZINE HYDROCHLORIDE 50 MG: 25 TABLET ORAL at 06:11

## 2023-11-23 RX ADMIN — DOCUSATE SODIUM 200 MG: 100 CAPSULE, LIQUID FILLED ORAL at 08:11

## 2023-11-23 RX ADMIN — OXYCODONE HYDROCHLORIDE AND ACETAMINOPHEN 1 TABLET: 10; 325 TABLET ORAL at 04:11

## 2023-11-23 RX ADMIN — IBUPROFEN 600 MG: 200 TABLET, FILM COATED ORAL at 11:11

## 2023-11-23 NOTE — DISCHARGE SUMMARY
[facility]  Obstetrics  Discharge Summary      Patient Name: Zoran Dubose  MRN: [id number]  Admission Date: 2023  Hospital Length of Stay: 4 days  Discharge Date and Time:  2023 6:05 AM  Attending Physician: Yuval Ziegler MD   Discharging Provider: Yuval Ziegler MD   Primary Care Provider: June Zaragoza MD    HPI: No notes on file        Procedure(s) (LRB):   SECTION (N/A)     Hospital Course:   No notes on file     Consults (From admission, onward)          Status Ordering Provider     Inpatient consult to Hospitalist  Once        Provider:  Rudolph Chen MD    Acknowledged YUVAL ZIEGLER     Inpatient consult to Anesthesiology  Once        Provider:  (Not yet assigned)    Acknowledged YUVAL ZIEGLER            Final Active Diagnoses:    Diagnosis Date Noted POA    PRINCIPAL PROBLEM:  Gestational hypertension, third trimester [O13.3] 2023 Yes      Problems Resolved During this Admission:          Lab Results   Component Value Date    GROUPTRH A POS 2023         Feeding Method: both breast and bottle    Immunizations       Date Immunization Status Dose Route/Site Given by    23 0957 MMR Incomplete 0.5 mL Subcutaneous/     23 0957 Tdap Incomplete 0.5 mL Intramuscular/           Physical exam:   Patient doing much better today.  Passing flatus.  Blood pressures are significantly improved on hydralazine 50 mg 3 times a day and Procardia XL 30 mg daily  Blood pressure is 115 to 130s over 70s  Afebrile   Dressing dry   Extremities no Homans or edema     Assessment plan:   Post operative day 3 primary  section uncontrolled hypertension.  Hypertension has improved significantly on these medications.    Will discharge home on hydralazine 50 mg 3 times a day and Procardia XL 30 mg daily   Percocet for pain  Follow up her own physician 1-2 weeks for a dressing removal and I have strongly encouraged the patient she needs to be seen a  "physician for her blood pressures.  Recommend she continue this blood pressure medicine for the next 2 weeks and then get that re-evaluated.  Rh positive  Delivery:    Episiotomy:     Lacerations:     Repair suture:     Repair # of packets:     Blood loss (ml):       Birth information:  YOB: 2022   Time of birth: 7:50 AM   Sex: male   Delivery type: , Low Transverse   Gestational Age: 39w2d     Measurements    Weight: 3550 g  Weight (lbs): 7 lb 13.2 oz  Length: 51.4 cm  Length (in): 20.25"         Delivery Clinician: Delivery Providers    Delivering clinician: Marya Spain MD   Provider Role    Fernanda Gresham, TAVON Circulator    Fatimah Russo RN Registered Nurse    Danita Pham RN Registered Nurse    Saravanan Boston MD Anesthesiologist    Chelita Mckee, NNP Nurse Practitioner    Belen Lee RN Pediatric NurseUnityPoint Health-MarshalltownAydee goss Surgical Tech    [facility], CRNA Nurse Anesthetist             Additional  information:  Forceps:    Vacuum:    Breech:    Observed anomalies      Living?:     Apgars    Living status: Living  Apgar Component Scores:  1 min.:  5 min.:  10 min.:  15 min.:  20 min.:    Skin color:  0  1  1  1     Heart rate:  1  1  2  2     Reflex irritability:  0  1  2  2     Muscle tone:  0  1  1  2     Respiratory effort:  0  1  2  2     Total:  1  5  8  9     Apgars assigned by: CHELITA ESCOBAR         Placenta: Delivered:       appearance  Pending Diagnostic Studies:       Procedure Component Value Units Date/Time    Hepatitis B surface antigen [[id number]] Collected: 23    Order Status: Sent Lab Status: In process Updated: 23    Specimen: Blood     Rubella antibody, IgG [[id number]] Collected: 23    Order Status: Sent Lab Status: In process Updated: 23    Specimen: Blood             Discharged Condition: good    Disposition: Home or Self Care    Follow Up:    Patient Instructions:      BREAST " PUMP FOR HOME USE     Order Specific Question Answer Comments   Type of pump: Electric    Weight: 102.1 kg (225 lb)    Does patient have medical equipment at home? none    Length of need (1-99 months): 99      Diet Adult Regular     No driving until:   Order Comments: 14 days for vaginal delivery  28 days for  Section     No dressing needed     Leave dressing on - Keep it clean, dry, and intact until clinic visit     Activity as tolerated   Order Comments: Pelvic rest x 6 weeks     Medications:  Current Discharge Medication List        START taking these medications    Details   hydrALAZINE (APRESOLINE) 50 MG tablet Take 1 tablet (50 mg total) by mouth every 8 (eight) hours.  Qty: 90 tablet, Refills: 11    Comments: .      NIFEdipine (PROCARDIA-XL) 30 MG (OSM) 24 hr tablet Take 1 tablet (30 mg total) by mouth once daily.  Qty: 30 tablet, Refills: 11    Comments: .      oxyCODONE-acetaminophen (PERCOCET) 5-325 mg per tablet Take 1 tablet by mouth every 4 (four) hours as needed for Pain.  Qty: 28 tablet, Refills: 0    Comments: Quantity prescribed more than 7 day supply? Yes, quantity medically necessary           CONTINUE these medications which have NOT CHANGED    Details   prenatal vit/iron fum/folic ac (PRENATAL 1+1 ORAL) Take 1 tablet by mouth once daily.      acetaminophen (TYLENOL) 650 MG TbSR Take 1 tablet (650 mg total) by mouth every 8 (eight) hours.  Qty: 30 tablet, Refills: 0      dexchlorphen-phenylephrine-DM (POLYTUSSIN DM) 1-5-10 mg/5 mL Syrp Take 10 mLs by mouth every 4 (four) hours as needed.  Qty: 480 mL, Refills: 0    Associated Diagnoses: Acute rhinosinusitis      fluticasone propionate (FLONASE) 50 mcg/actuation nasal spray 1 spray (50 mcg total) by Each Nostril route once daily.  Qty: 16 g, Refills: 0    Associated Diagnoses: Acute rhinosinusitis      ibuprofen (ADVIL,MOTRIN) 600 MG tablet Take 1 tablet (600 mg total) by mouth every 6 (six) hours as needed for Pain.  Qty: 20 tablet,  Refills: 0      ondansetron (ZOFRAN) 4 MG tablet Take 1 tablet (4 mg total) by mouth every 6 (six) hours.  Qty: 12 tablet, Refills: 0      promethazine (PHENERGAN) 25 MG tablet Take 1 tablet (25 mg total) by mouth every 6 (six) hours as needed for Nausea.  Qty: 15 tablet, Refills: 0             Marya Spain MD  [facility]

## 2023-11-23 NOTE — DISCHARGE INSTRUCTIONS
FOLLOW UP WITH YOUR DOCTOR IN 1 WEEK OR SOONER FOR ANY PROBLEMS.    Pelvic rest for 6 weeks (no sex, tampons, douching, nothing in the vagina)   You can experience vaginal bleeding on and off for up to 6 weeks, it will gradually get lighter and the color will change from bright red to a brownish discharge towards the end.     Activity:   NO strenuous activities or exercising for 6 weeks. Do not /lift anything over 15 pounds and no heavy housework or cleaning for 6 weeks. Limit stair climbing to twice a day during the first 2 weeks.   NO driving for 4 weeks. You may take short car trips but do not drive.   You may shower ONLY for the first 2 weeks, after 2 weeks you can soak in a bathtub. Use a mild soap, no heavy perfumes or fragrances to avoid irritation.   Walking frequently following a  delivery promotes healing and decreases pain associated with gas.   If constipation develops: You may take Colace (stool softener), Milk of Magnesia, Dulcolax or Miralax. All of these medications are sold over the counter.   Incision Care:   Clean your incision with mild soap & warm water only- do not scrub- let warm water run over it, then pat dry.     Pain Relief:   You may take Motrin for mild pain & uterine cramping.     Emotional Changes:   You may experience baby blues after delivery. You may feel let down, anxious and cry easily. This is normal. These feelings can begin 2-3 days after delivery and usually disappear in about a week or two. Prolonged sadness may indicate postpartum depression.       ***SEEK IMMEDIATE HELP FROM THE EMERGENCY ROOM IF YOU HAVE ANY CHEST PAIN OR DIFFICULTY BREATHING.    Call your doctor for any of the following:   Problems with any of your medications, inability to eat.   Foul smelling vaginal discharge.   If you notice pus-like drainage from your incision, if your incision or the area around it becomes hot or swollen, or if you notice a foul smelling odor.   Temperature above  100.4.   Heavy vaginal bleeding. All women bleed different after delivery and each delivery is different. Heavy bleeding consists of saturating a treva pad in a 1 hour time period. Passing clots are normal, if you pass a blood clot larger than the size of a golf ball call your doctor's office.   If you experience pain in your legs/calves, if one leg increases in size and becomes swollen or becomes hot to touch or discolored.   Crying or periods of sadness beyond 2 weeks.     If you are breast feeding:   Wash your breasts with mild soap and warm water.   You should wear a supportive bra.   You should continue to take a prenatal vitamin for 6 weeks or until breastfeeding is discontinued.   If nipples are sore, apply a few drops of breast milk after nursing and let air dry or you can use Lanolin cream.   If breasts are engorged, apply warmth and express milk.   [facility] lactation consultant is available at [phone] for your breastfeeding needs.    If you are not breastfeeding:   Wear a tight bra and do not stimulate your breasts. Avoid handling your breasts and do not express milk. You may apply ice packs or cabbage leaves to relieve discomfort from engorgement. If your breasts become warm to touch, reddened or lumps develop call your doctor.

## 2023-11-23 NOTE — PLAN OF CARE
Patient cleared for discharge from case management standpoint.    Follow up appointment instructions added to AVS (could not schedule d/t holiday).     Chart and discharge orders reviewed.  Patient discharged home with no further case management needs.   11/23/23 1105   Final Note   Assessment Type Final Discharge Note   Anticipated Discharge Disposition Home   Post-Acute Status   Discharge Delays None known at this time

## 2023-11-23 NOTE — NURSING
Postpartum d/c instructions explained and given to pt. All questions and concerns addressed. Pt verbalizes understanding.

## 2023-11-23 NOTE — LACTATION NOTE
11/23/23 1140   Maternal Assessment   Breast Density filling;soft   Areola Bilateral:;elastic   Nipples Bilateral:;everted   Maternal Infant Feeding   Maternal Emotional State assist needed   Infant Positioning clutch/football   Signs of Milk Transfer audible swallow;infant jaw motion present   Pain with Feeding no   Comfort Measures Before/During Feeding infant position adjusted;latch adjusted;maternal position adjusted   Latch Assistance yes   Breast Pumping   Breast Pumping Interventions post-feed pumping encouraged     Assisted to latch baby to right breast in football position. Baby latched deeply, nursing well with numerous audible swallows. Mother denies pain during feeding. Reviewed basic breastfeeding instructions and encouraged to call me for any further breastfeeding assistance. Patient verbalizes understanding of all instructions with good recall.    Instructed on proper latch to facilitate effective breastfeeding.  Discussed recognizing hunger cues, appropriate positioning and wide mouth latch.  Discussed ways to determine an effective latch including:  areola included in latch, rhythmic/nutritive sucking and audible swallowing.  Also discussed soreness/tenderness associated with latch and prevention and treatment.  Pt states understanding and verbalized appropriate recall.

## 2023-11-25 ENCOUNTER — HOSPITAL ENCOUNTER (INPATIENT)
Facility: HOSPITAL | Age: 23
LOS: 3 days | Discharge: HOME | DRG: 776 | End: 2023-11-28
Attending: EMERGENCY MEDICINE | Admitting: SPECIALIST
Payer: MEDICAID

## 2023-11-25 DIAGNOSIS — O13.3 GESTATIONAL HYPERTENSION, THIRD TRIMESTER: Primary | ICD-10-CM

## 2023-11-25 DIAGNOSIS — R03.0 ELEVATED BLOOD PRESSURE READING: ICD-10-CM

## 2023-11-25 LAB
ALBUMIN SERPL BCP-MCNC: 3.4 G/DL (ref 3.5–5.2)
ALP SERPL-CCNC: 88 U/L (ref 55–135)
ALT SERPL W/O P-5'-P-CCNC: 22 U/L (ref 10–44)
ANION GAP SERPL CALC-SCNC: 10 MMOL/L (ref 8–16)
AST SERPL-CCNC: 25 U/L (ref 10–40)
BACTERIA #/AREA URNS HPF: NEGATIVE /HPF
BASOPHILS # BLD AUTO: 0.05 K/UL (ref 0–0.2)
BASOPHILS NFR BLD: 0.4 % (ref 0–1.9)
BILIRUB SERPL-MCNC: 0.2 MG/DL (ref 0.1–1)
BILIRUB UR QL STRIP: NEGATIVE
BUN SERPL-MCNC: 11 MG/DL (ref 6–20)
CALCIUM SERPL-MCNC: 9.2 MG/DL (ref 8.7–10.5)
CHLORIDE SERPL-SCNC: 106 MMOL/L (ref 95–110)
CLARITY UR: CLEAR
CO2 SERPL-SCNC: 21 MMOL/L (ref 23–29)
COLOR UR: YELLOW
CREAT SERPL-MCNC: 0.5 MG/DL (ref 0.5–1.4)
DIFFERENTIAL METHOD: ABNORMAL
EOSINOPHIL # BLD AUTO: 0.2 K/UL (ref 0–0.5)
EOSINOPHIL NFR BLD: 1.7 % (ref 0–8)
ERYTHROCYTE [DISTWIDTH] IN BLOOD BY AUTOMATED COUNT: 16.3 % (ref 11.5–14.5)
EST. GFR  (NO RACE VARIABLE): >60 ML/MIN/1.73 M^2
GLUCOSE SERPL-MCNC: 96 MG/DL (ref 70–110)
GLUCOSE UR QL STRIP: NEGATIVE
HCT VFR BLD AUTO: 29.8 % (ref 37–48.5)
HGB BLD-MCNC: 9.6 G/DL (ref 12–16)
HGB UR QL STRIP: ABNORMAL
HYALINE CASTS #/AREA URNS LPF: 1 /LPF
IMM GRANULOCYTES # BLD AUTO: 0.11 K/UL (ref 0–0.04)
IMM GRANULOCYTES NFR BLD AUTO: 0.8 % (ref 0–0.5)
KETONES UR QL STRIP: ABNORMAL
LEUKOCYTE ESTERASE UR QL STRIP: NEGATIVE
LYMPHOCYTES # BLD AUTO: 1.7 K/UL (ref 1–4.8)
LYMPHOCYTES NFR BLD: 13.1 % (ref 18–48)
MAGNESIUM SERPL-MCNC: 1.8 MG/DL (ref 1.6–2.6)
MCH RBC QN AUTO: 24.7 PG (ref 27–31)
MCHC RBC AUTO-ENTMCNC: 32.2 G/DL (ref 32–36)
MCV RBC AUTO: 77 FL (ref 82–98)
MICROSCOPIC COMMENT: ABNORMAL
MONOCYTES # BLD AUTO: 0.8 K/UL (ref 0.3–1)
MONOCYTES NFR BLD: 6.1 % (ref 4–15)
NEUTROPHILS # BLD AUTO: 10.2 K/UL (ref 1.8–7.7)
NEUTROPHILS NFR BLD: 77.9 % (ref 38–73)
NITRITE UR QL STRIP: NEGATIVE
NRBC BLD-RTO: 0 /100 WBC
PH UR STRIP: 7 [PH] (ref 5–8)
PLATELET # BLD AUTO: 425 K/UL (ref 150–450)
PMV BLD AUTO: 9.5 FL (ref 9.2–12.9)
POTASSIUM SERPL-SCNC: 3.9 MMOL/L (ref 3.5–5.1)
PROT SERPL-MCNC: 6.5 G/DL (ref 6–8.4)
PROT UR QL STRIP: ABNORMAL
RBC # BLD AUTO: 3.88 M/UL (ref 4–5.4)
RBC #/AREA URNS HPF: 15 /HPF (ref 0–4)
SODIUM SERPL-SCNC: 137 MMOL/L (ref 136–145)
SP GR UR STRIP: 1.02 (ref 1–1.03)
SQUAMOUS #/AREA URNS HPF: 1 /HPF
URN SPEC COLLECT METH UR: ABNORMAL
UROBILINOGEN UR STRIP-ACNC: NEGATIVE EU/DL
WBC # BLD AUTO: 13.08 K/UL (ref 3.9–12.7)
WBC #/AREA URNS HPF: 2 /HPF (ref 0–5)

## 2023-11-25 PROCEDURE — 85025 COMPLETE CBC W/AUTO DIFF WBC: CPT | Performed by: EMERGENCY MEDICINE

## 2023-11-25 PROCEDURE — 12000002 HC ACUTE/MED SURGE SEMI-PRIVATE ROOM

## 2023-11-25 PROCEDURE — G0378 HOSPITAL OBSERVATION PER HR: HCPCS

## 2023-11-25 PROCEDURE — 83735 ASSAY OF MAGNESIUM: CPT | Performed by: EMERGENCY MEDICINE

## 2023-11-25 PROCEDURE — 63600175 PHARM REV CODE 636 W HCPCS: Performed by: EMERGENCY MEDICINE

## 2023-11-25 PROCEDURE — 80053 COMPREHEN METABOLIC PANEL: CPT | Performed by: EMERGENCY MEDICINE

## 2023-11-25 PROCEDURE — 99285 EMERGENCY DEPT VISIT HI MDM: CPT | Mod: 25

## 2023-11-25 PROCEDURE — 96375 TX/PRO/DX INJ NEW DRUG ADDON: CPT

## 2023-11-25 PROCEDURE — 81001 URINALYSIS AUTO W/SCOPE: CPT | Performed by: EMERGENCY MEDICINE

## 2023-11-25 RX ORDER — DROPERIDOL 2.5 MG/ML
0.62 INJECTION, SOLUTION INTRAMUSCULAR; INTRAVENOUS ONCE
Status: COMPLETED | OUTPATIENT
Start: 2023-11-25 | End: 2023-11-25

## 2023-11-25 RX ORDER — SODIUM CHLORIDE, SODIUM LACTATE, POTASSIUM CHLORIDE, CALCIUM CHLORIDE 600; 310; 30; 20 MG/100ML; MG/100ML; MG/100ML; MG/100ML
INJECTION, SOLUTION INTRAVENOUS CONTINUOUS
Status: DISCONTINUED | OUTPATIENT
Start: 2023-11-25 | End: 2023-11-28

## 2023-11-25 RX ORDER — KETOROLAC TROMETHAMINE 30 MG/ML
15 INJECTION, SOLUTION INTRAMUSCULAR; INTRAVENOUS
Status: COMPLETED | OUTPATIENT
Start: 2023-11-25 | End: 2023-11-25

## 2023-11-25 RX ORDER — CALCIUM GLUCONATE 98 MG/ML
1 INJECTION, SOLUTION INTRAVENOUS
Status: DISCONTINUED | OUTPATIENT
Start: 2023-11-25 | End: 2023-11-28 | Stop reason: HOSPADM

## 2023-11-25 RX ORDER — ONDANSETRON 2 MG/ML
4 INJECTION INTRAMUSCULAR; INTRAVENOUS
Status: COMPLETED | OUTPATIENT
Start: 2023-11-25 | End: 2023-11-25

## 2023-11-25 RX ORDER — BUTALBITAL, ACETAMINOPHEN AND CAFFEINE 50; 325; 40 MG/1; MG/1; MG/1
2 TABLET ORAL EVERY 6 HOURS PRN
Status: DISCONTINUED | OUTPATIENT
Start: 2023-11-25 | End: 2023-11-25

## 2023-11-25 RX ORDER — BUTALBITAL, ACETAMINOPHEN AND CAFFEINE 50; 325; 40 MG/1; MG/1; MG/1
2 TABLET ORAL EVERY 4 HOURS PRN
Status: DISCONTINUED | OUTPATIENT
Start: 2023-11-25 | End: 2023-11-28 | Stop reason: HOSPADM

## 2023-11-25 RX ORDER — SODIUM CHLORIDE 0.9 % (FLUSH) 0.9 %
10 SYRINGE (ML) INJECTION
Status: DISCONTINUED | OUTPATIENT
Start: 2023-11-25 | End: 2023-11-28 | Stop reason: HOSPADM

## 2023-11-25 RX ORDER — TALC
6 POWDER (GRAM) TOPICAL NIGHTLY PRN
Status: DISCONTINUED | OUTPATIENT
Start: 2023-11-25 | End: 2023-11-28 | Stop reason: HOSPADM

## 2023-11-25 RX ORDER — MAGNESIUM SULFATE HEPTAHYDRATE 40 MG/ML
2 INJECTION, SOLUTION INTRAVENOUS CONTINUOUS
Status: DISCONTINUED | OUTPATIENT
Start: 2023-11-25 | End: 2023-11-28

## 2023-11-25 RX ORDER — MAGNESIUM SULFATE HEPTAHYDRATE 40 MG/ML
4 INJECTION, SOLUTION INTRAVENOUS ONCE
Status: COMPLETED | OUTPATIENT
Start: 2023-11-25 | End: 2023-11-26

## 2023-11-25 RX ADMIN — DROPERIDOL 0.62 MG: 2.5 INJECTION, SOLUTION INTRAMUSCULAR; INTRAVENOUS at 10:11

## 2023-11-25 RX ADMIN — ONDANSETRON 4 MG: 2 INJECTION INTRAMUSCULAR; INTRAVENOUS at 08:11

## 2023-11-25 RX ADMIN — KETOROLAC TROMETHAMINE 15 MG: 30 INJECTION, SOLUTION INTRAMUSCULAR at 08:11

## 2023-11-25 NOTE — Clinical Note
Diagnosis: Preeclampsia in postpartum period [[id number]]   Future Attending Provider: NAKIA ABDI [[id number]]   Place in Observation: [facility] [[id number]]   Admitting Provider:: NAKIA ABDI [[id number]]

## 2023-11-26 LAB
MAGNESIUM SERPL-MCNC: 4.6 MG/DL (ref 1.6–2.6)
MAGNESIUM SERPL-MCNC: 5.3 MG/DL (ref 1.6–2.6)
MAGNESIUM SERPL-MCNC: 5.5 MG/DL (ref 1.6–2.6)

## 2023-11-26 PROCEDURE — G0378 HOSPITAL OBSERVATION PER HR: HCPCS

## 2023-11-26 PROCEDURE — 25000003 PHARM REV CODE 250: Performed by: SPECIALIST

## 2023-11-26 PROCEDURE — 83735 ASSAY OF MAGNESIUM: CPT | Mod: 91 | Performed by: SPECIALIST

## 2023-11-26 PROCEDURE — 63600175 PHARM REV CODE 636 W HCPCS: Performed by: SPECIALIST

## 2023-11-26 PROCEDURE — 99232 SBSQ HOSP IP/OBS MODERATE 35: CPT | Mod: ,,, | Performed by: OBSTETRICS & GYNECOLOGY

## 2023-11-26 PROCEDURE — 36415 COLL VENOUS BLD VENIPUNCTURE: CPT | Performed by: SPECIALIST

## 2023-11-26 PROCEDURE — 63600175 PHARM REV CODE 636 W HCPCS: Performed by: OBSTETRICS & GYNECOLOGY

## 2023-11-26 PROCEDURE — 12000002 HC ACUTE/MED SURGE SEMI-PRIVATE ROOM

## 2023-11-26 PROCEDURE — 96366 THER/PROPH/DIAG IV INF ADDON: CPT

## 2023-11-26 PROCEDURE — 96375 TX/PRO/DX INJ NEW DRUG ADDON: CPT

## 2023-11-26 PROCEDURE — 96365 THER/PROPH/DIAG IV INF INIT: CPT

## 2023-11-26 PROCEDURE — 99232 PR SUBSEQUENT HOSPITAL CARE,LEVL II: ICD-10-PCS | Mod: ,,, | Performed by: OBSTETRICS & GYNECOLOGY

## 2023-11-26 RX ORDER — HYDRALAZINE HYDROCHLORIDE 25 MG/1
50 TABLET, FILM COATED ORAL EVERY 8 HOURS
Status: DISCONTINUED | OUTPATIENT
Start: 2023-11-26 | End: 2023-11-28 | Stop reason: HOSPADM

## 2023-11-26 RX ORDER — LABETALOL HYDROCHLORIDE 5 MG/ML
10 INJECTION, SOLUTION INTRAVENOUS ONCE
Status: COMPLETED | OUTPATIENT
Start: 2023-11-26 | End: 2023-11-26

## 2023-11-26 RX ORDER — NIFEDIPINE 30 MG/1
30 TABLET, EXTENDED RELEASE ORAL DAILY
Status: DISCONTINUED | OUTPATIENT
Start: 2023-11-26 | End: 2023-11-27

## 2023-11-26 RX ADMIN — MAGNESIUM SULFATE IN WATER 2 G/HR: 40 INJECTION, SOLUTION INTRAVENOUS at 01:11

## 2023-11-26 RX ADMIN — BUTALBITAL, ACETAMINOPHEN AND CAFFEINE 2 TABLET: 325; 50; 40 TABLET ORAL at 12:11

## 2023-11-26 RX ADMIN — SODIUM CHLORIDE, SODIUM LACTATE, POTASSIUM CHLORIDE, AND CALCIUM CHLORIDE: .6; .31; .03; .02 INJECTION, SOLUTION INTRAVENOUS at 01:11

## 2023-11-26 RX ADMIN — HYDRALAZINE HYDROCHLORIDE 50 MG: 25 TABLET, FILM COATED ORAL at 01:11

## 2023-11-26 RX ADMIN — NIFEDIPINE 30 MG: 30 TABLET, FILM COATED, EXTENDED RELEASE ORAL at 01:11

## 2023-11-26 RX ADMIN — LABETALOL HYDROCHLORIDE 10 MG: 5 INJECTION, SOLUTION INTRAVENOUS at 11:11

## 2023-11-26 RX ADMIN — NIFEDIPINE 30 MG: 30 TABLET, FILM COATED, EXTENDED RELEASE ORAL at 09:11

## 2023-11-26 RX ADMIN — HYDRALAZINE HYDROCHLORIDE 50 MG: 25 TABLET, FILM COATED ORAL at 06:11

## 2023-11-26 RX ADMIN — HYDRALAZINE HYDROCHLORIDE 50 MG: 25 TABLET, FILM COATED ORAL at 09:11

## 2023-11-26 RX ADMIN — MAGNESIUM SULFATE IN WATER 2 G/HR: 40 INJECTION, SOLUTION INTRAVENOUS at 06:11

## 2023-11-26 RX ADMIN — BUTALBITAL, ACETAMINOPHEN AND CAFFEINE 2 TABLET: 325; 50; 40 TABLET ORAL at 11:11

## 2023-11-26 RX ADMIN — MAGNESIUM SULFATE HEPTAHYDRATE 4 G: 40 INJECTION, SOLUTION INTRAVENOUS at 12:11

## 2023-11-26 RX ADMIN — HYDRALAZINE HYDROCHLORIDE 50 MG: 25 TABLET, FILM COATED ORAL at 02:11

## 2023-11-26 NOTE — ED PROVIDER NOTES
Encounter Date: 2023       History     Chief Complaint   Patient presents with    Nausea    Hypertension    Headache     C section on Monday. Today has been having n/v, and htn. Took bp med today     23-year-old female past medical history of hypertension, presents emergency department with high blood pressure, headache, vomiting.  Patient says that she developed hypertension after delivery.  Patient delivered via  6 days ago by Dr. Lee edmondson.  Says she was placed on hydralazine 3 times a day for high blood pressure after delivery.  Says she is been compliant with it.  She has had a headache since earlier today, is been associated with some nausea and vomiting.  She says that she is not have any associated diarrhea.  She denies any vision changes.  She does state that she has some swelling in her legs it has been this way since delivery.       Review of patient's allergies indicates:  No Known Allergies  Past Medical History:   Diagnosis Date    Hypertension      Past Surgical History:   Procedure Laterality Date     SECTION N/A 2023    Procedure:  SECTION;  Surgeon: Marya Spain MD;  Location: [facility] L&D;  Service: OB/GYN;  Laterality: N/A;     Family History   Problem Relation Age of Onset    No Known Problems Mother     Seizures Father     Hypertension Maternal Grandmother     Diabetes Maternal Grandmother     Hypertension Paternal Grandmother     Diabetes Paternal Grandmother     Hypertension Paternal Grandfather     Congenital heart disease Neg Hx     Arrhythmia Neg Hx     Pacemaker/defibrilator Neg Hx      Social History     Tobacco Use    Smoking status: Never   Substance Use Topics    Alcohol use: No    Drug use: Yes     Types: Marijuana     Review of Systems   Constitutional:  Negative for chills and fever.   HENT:  Negative for sore throat.    Eyes:  Negative for visual disturbance.   Respiratory:  Negative for shortness of breath.    Cardiovascular:  Negative for  chest pain and palpitations.   Gastrointestinal:  Positive for nausea and vomiting. Negative for abdominal pain.   Genitourinary:  Negative for dysuria.   Musculoskeletal:  Negative for back pain.   Skin:  Negative for rash.   Neurological:  Positive for headaches. Negative for weakness.   Hematological:  Does not bruise/bleed easily.   All other systems reviewed and are negative.      Physical Exam     Initial Vitals [11/25/23 1914]   BP Pulse Resp Temp SpO2   (!) 185/112 101 20 98.5 °F (36.9 °C) 99 %      MAP       --         Physical Exam    Nursing note and vitals reviewed.  Constitutional: She appears well-developed and well-nourished. No distress.   HENT:   Head: Normocephalic and atraumatic.   Mouth/Throat: No oropharyngeal exudate.   Eyes: Conjunctivae and EOM are normal. Pupils are equal, round, and reactive to light.   Neck: Neck supple. No tracheal deviation present.   Cardiovascular:  Normal rate, regular rhythm, normal heart sounds and intact distal pulses.           No murmur heard.  Pulmonary/Chest: Breath sounds normal. No stridor. No respiratory distress. She has no wheezes. She has no rhonchi. She has no rales.   Abdominal: Abdomen is soft. She exhibits no distension. There is no abdominal tenderness. There is no rebound.   Musculoskeletal:         General: Edema (1+ pitting bilateral lower legs ankles and feet) present. No tenderness. Normal range of motion.      Cervical back: Neck supple.     Neurological: She is alert and oriented to person, place, and time. She has normal strength. No cranial nerve deficit or sensory deficit. GCS score is 15. GCS eye subscore is 4. GCS verbal subscore is 5. GCS motor subscore is 6.   Speech is normal.  No facial droop.  5/5 strength in upper and lower extremities bilaterally.  Normal finger-to-nose.  No pronator drift.   Skin: Skin is warm and dry. Capillary refill takes less than 2 seconds. No rash noted. No erythema. No pallor.   Psychiatric: She has a  normal mood and affect. Thought content normal.         ED Course   Procedures  Labs Reviewed   CBC W/ AUTO DIFFERENTIAL - Abnormal; Notable for the following components:       Result Value    WBC 13.08 (*)     RBC 3.88 (*)     Hemoglobin 9.6 (*)     Hematocrit 29.8 (*)     MCV 77 (*)     MCH 24.7 (*)     RDW 16.3 (*)     Immature Granulocytes 0.8 (*)     Gran # (ANC) 10.2 (*)     Immature Grans (Abs) 0.11 (*)     Gran % 77.9 (*)     Lymph % 13.1 (*)     All other components within normal limits   COMPREHENSIVE METABOLIC PANEL - Abnormal; Notable for the following components:    CO2 21 (*)     Albumin 3.4 (*)     All other components within normal limits   URINALYSIS, REFLEX TO URINE CULTURE - Abnormal; Notable for the following components:    Protein, UA Trace (*)     Ketones, UA 1+ (*)     Occult Blood UA 1+ (*)     All other components within normal limits    Narrative:     Specimen Source->Urine   URINALYSIS MICROSCOPIC - Abnormal; Notable for the following components:    RBC, UA 15 (*)     All other components within normal limits    Narrative:     Specimen Source->Urine   MAGNESIUM          Results for orders placed or performed during the hospital encounter of 11/25/23   CBC auto differential   Result Value Ref Range    WBC 13.08 (H) 3.90 - 12.70 K/uL    RBC 3.88 (L) 4.00 - 5.40 M/uL    Hemoglobin 9.6 (L) 12.0 - 16.0 g/dL    Hematocrit 29.8 (L) 37.0 - 48.5 %    MCV 77 (L) 82 - 98 fL    MCH 24.7 (L) 27.0 - 31.0 pg    MCHC 32.2 32.0 - 36.0 g/dL    RDW 16.3 (H) 11.5 - 14.5 %    Platelets 425 150 - 450 K/uL    MPV 9.5 9.2 - 12.9 fL    Immature Granulocytes 0.8 (H) 0.0 - 0.5 %    Gran # (ANC) 10.2 (H) 1.8 - 7.7 K/uL    Immature Grans (Abs) 0.11 (H) 0.00 - 0.04 K/uL    Lymph # 1.7 1.0 - 4.8 K/uL    Mono # 0.8 0.3 - 1.0 K/uL    Eos # 0.2 0.0 - 0.5 K/uL    Baso # 0.05 0.00 - 0.20 K/uL    nRBC 0 0 /100 WBC    Gran % 77.9 (H) 38.0 - 73.0 %    Lymph % 13.1 (L) 18.0 - 48.0 %    Mono % 6.1 4.0 - 15.0 %    Eosinophil % 1.7  0.0 - 8.0 %    Basophil % 0.4 0.0 - 1.9 %    Differential Method Automated    Comprehensive metabolic panel   Result Value Ref Range    Sodium 137 136 - 145 mmol/L    Potassium 3.9 3.5 - 5.1 mmol/L    Chloride 106 95 - 110 mmol/L    CO2 21 (L) 23 - 29 mmol/L    Glucose 96 70 - 110 mg/dL    BUN 11 6 - 20 mg/dL    Creatinine 0.5 0.5 - 1.4 mg/dL    Calcium 9.2 8.7 - 10.5 mg/dL    Total Protein 6.5 6.0 - 8.4 g/dL    Albumin 3.4 (L) 3.5 - 5.2 g/dL    Total Bilirubin 0.2 0.1 - 1.0 mg/dL    Alkaline Phosphatase 88 55 - 135 U/L    AST 25 10 - 40 U/L    ALT 22 10 - 44 U/L    eGFR >60.0 >60 mL/min/1.73 m^2    Anion Gap 10 8 - 16 mmol/L   Urinalysis, Reflex to Urine Culture Urine, Clean Catch    Specimen: Urine, Clean Catch   Result Value Ref Range    Specimen UA Urine, Clean Catch     Color, UA Yellow Yellow, Straw, Laila    Appearance, UA Clear Clear    pH, UA 7.0 5.0 - 8.0    Specific Gravity, UA 1.020 1.005 - 1.030    Protein, UA Trace (A) Negative    Glucose, UA Negative Negative    Ketones, UA 1+ (A) Negative    Bilirubin (UA) Negative Negative    Occult Blood UA 1+ (A) Negative    Nitrite, UA Negative Negative    Urobilinogen, UA Negative Negative EU/dL    Leukocytes, UA Negative Negative   Magnesium   Result Value Ref Range    Magnesium 1.8 1.6 - 2.6 mg/dL   Urinalysis Microscopic   Result Value Ref Range    RBC, UA 15 (H) 0 - 4 /hpf    WBC, UA 2 0 - 5 /hpf    Bacteria Negative None-Occ /hpf    Squam Epithel, UA 1 /hpf    Hyaline Casts, UA 1 0-1/lpf /lpf    Microscopic Comment SEE COMMENT        Imaging Results              CT Head Without Contrast (Final result)  Result time 11/25/23 22:09:34      Final result by Jose Solitario MD (11/25/23 22:09:34)                   Narrative:    EXAM DESCRIPTION:  CT HEAD WITHOUT CONTRAST  RadLex: CT HEAD WITHOUT IV CONTRAST    CLINICAL HISTORY:  23 years  Female;  Headache, new or worsening, neuro deficit (Age 19-49y)    TECHNOLOGIST NOTES: Headache; elevated bp    COMPARISONS:  3/6/2022    TECHNIQUE: Axial CT images were obtained from the skull base to vertex. This exam was performed according to our departmental dose-optimization program, which includes automated exposure control, adjustment of the mA and/or kV according to patient size and/or use of iterative reconstruction techniques.    FINDINGS:  No acute intracranial hemorrhage. No mass effect, midline shift or hydrocephalus. The gray-white matter differentiation is grossly unremarkable. No evidence of acute large territory infarct.   Within the broad range of normal, brain volume is age appropriate.    The visualized paranasal sinuses are grossly unremarkable. The mastoid air cells are clear. .      IMPRESSION:  1.  No acute intracranial process is identified.  2.  If symptoms persist or further imaging is clinically indicated, consider follow-up MRI or repeat CT imaging    Electronically signed by:  Jose Solitario MD  11/25/2023 10:09 PM [facility] Workstation: [id number]                                     Medications   sodium chloride 0.9% flush 10 mL (has no administration in time range)   melatonin tablet 6 mg (has no administration in time range)   ketorolac injection 15 mg (15 mg Intravenous Given 11/25/23 2026)   ondansetron injection 4 mg (4 mg Intravenous Given 11/25/23 2026)   droPERidol injection 0.625 mg (0.625 mg Intravenous Given 11/25/23 2200)     Medical Decision Making  Differential includes but not limited to postpartum preeclampsia, nonspecific headache, tension headache, migraine headache, uncontrolled high blood pressure, dehydration,    Emergent evaluation of a 23-year-old female presents emergency department headache and setting of high blood pressure postpartum 6 days.  Most likely and most worrisome diagnosis will be postpartum preeclampsia.  Patient has been treated with medical management here with medication for headache including Zofran Toradol and also given droperidol.  No relief of her headache.  Patient still  with persistent headache has inpatient CT scan does not show any acute pathology.  Patient labs are relatively unremarkable other than mild anemia.  Patient may need magnesium therapy.  Patient case discussed with OBGYN Dr. Palmer who will admit the patient to the hospital for further evaluation of her symptoms.    Amount and/or Complexity of Data Reviewed  External Data Reviewed: labs.  Labs: ordered. Decision-making details documented in ED Course.  Radiology: ordered and independent interpretation performed. Decision-making details documented in ED Course.    Risk  OTC drugs.  Prescription drug management.               ED Course as of 11/25/23 2233   Sat Nov 25, 2023 2140 I discussed the case with Dr. Palmer who stated to reassess patient after returned from CT scan and will decide whether or not patient needs be admitted for observation [JR]   2231 Dr. Palmer will admit the patient to the hospital. [JR]      ED Course User Index  [JR] Tito Granados DO                        Clinical Impression:  Final diagnoses:  [O14.95] Preeclampsia in postpartum period (Primary)  [R03.0] Elevated blood pressure reading          ED Disposition Condition    Observation Stable                Tito Granados DO  11/25/23 2233       Tito Granados DO  11/25/23 2233

## 2023-11-26 NOTE — PLAN OF CARE
Pt admitted for postpartum preeclampsia, Mag Load started at 0027. Pt complaining of headache. Breast pump given to pt to pump. Pt denies any other symptoms. VS stable.

## 2023-11-26 NOTE — NURSING
Nurses Note -- 4 Eyes      11/26/2023   6:26 AM      Skin assessed during: Admit      [x] No Altered Skin Integrity Present    []Prevention Measures Documented      [] Yes- Altered Skin Integrity Present or Discovered   [] LDA Added if Not in Epic (Describe Wound)   [] New Altered Skin Integrity was Present on Admit and Documented in LDA   [] Wound Image Taken    Wound Care Consulted? No    Attending Nurse:  Teressa Jesus RN/Staff Member:   Zabrina Duval RN

## 2023-11-26 NOTE — PROGRESS NOTES
[facility]  POSTPARTUM EVALUATION NOTE    Subjective:   Chief Complaint:  Nausea, Hypertension, and Headache (C section on Monday. Today has been having n/v, and htn. Took bp med today)       Patient ID: Zoran Dubose is a  23 y.o. female.    Date: 2023    Reason for Admission:  Postpartum preeclampsia with severe features    History of Present Illness:  Prenatal Care: Dr. Spain    The patient is status post primary  section on 2023 for a term pregnancy complicated by preeclampsia with severe features (hypertension)  She was discharge but re-presented to the emergency room on 2023 due to headaches.  At that time she was found to be hypertensive despite oral hydralazine at home and was subsequently admitted.      She is currently on IV magnesium to be discontinued at 12:00 p.m. (12:30 a.m. on 2023) in addition to hydralazine Procardia has been added to her medication regimen.    The patient is currently without significant complaints.  She reports a mild headache but overall improved.    No abdominal pelvic pain and no significant vaginal.      GYN & OB History  OB History          1    Para   1    Term   1            AB        Living   1         SAB        IAB        Ectopic        Multiple   0    Live Births   1                 Allergies: Review of patient's allergies indicates:  No Known Allergies    Past Medical History:   Diagnosis Date    Hypertension        Past Surgical History:   Procedure Laterality Date     SECTION N/A 2023    Procedure:  SECTION;  Surgeon: Marya Spain MD;  Location: [facility] L&D;  Service: OB/GYN;  Laterality: N/A;       Medications    Current Facility-Administered Medications:     butalbital-acetaminophen-caffeine -40 mg per tablet 2 tablet, 2 tablet, Oral, Q4H PRN, Neville Palmer MD, 2 tablet at 23 0033    calcium gluconate 100 mg/mL (10%) injection 1 g, 1 g, Intravenous, PRN,  Neville Palmer MD    hydrALAZINE tablet 50 mg, 50 mg, Oral, Q8H, Neville Palmer MD, 50 mg at 11/26/23 0641    ibuprofen tablet 600 mg, 600 mg, Oral, Q6H PRN, Neville Palmer MD    [COMPLETED] magnesium sulfate in water 40 gram/1,000 mL (4 %) bolus from bag 4 g 100 mL, 4 g, Intravenous, Once, 4 g at 11/26/23 0027 **AND** lactated ringers infusion, , Intravenous, Continuous, Neville Palmer MD, Last Rate: 75 mL/hr at 11/26/23 0102, New Bag at 11/26/23 0102    magnesium sulfate in water 40 gram/1,000 mL (4 %) infusion, 2 g/hr, Intravenous, Continuous, Neville Palmer MD, Last Rate: 50 mL/hr at 11/26/23 0100, 2 g/hr at 11/26/23 0100    melatonin tablet 6 mg, 6 mg, Oral, Nightly PRN, RebTito molina, DO    NIFEdipine 24 hr tablet 30 mg, 30 mg, Oral, Daily, Neville Palmer MD, 30 mg at 11/26/23 0927    sodium chloride 0.9% flush 10 mL, 10 mL, Intravenous, PRN, Rebtracy, Tito, DO     Social History     Tobacco Use    Smoking status: Never   Substance Use Topics    Alcohol use: No    Drug use: Yes     Types: Marijuana       Family History   Problem Relation Age of Onset    No Known Problems Mother     Seizures Father     Hypertension Maternal Grandmother     Diabetes Maternal Grandmother     Hypertension Paternal Grandmother     Diabetes Paternal Grandmother     Hypertension Paternal Grandfather     Congenital heart disease Neg Hx     Arrhythmia Neg Hx     Pacemaker/defibrilator Neg Hx        Review of Systems (at today's evaluation)  Review of Systems   Constitutional:  Negative for fever.   Eyes:  Negative for visual disturbance.   Respiratory:  Negative for shortness of breath.    Cardiovascular:  Negative for chest pain.   Gastrointestinal:  Negative for abdominal pain and nausea.   Genitourinary:  Negative for dysuria.   Neurological:  Positive for headaches.        Objective:      Vitals:    11/26/23 1128   BP:    Pulse: 98   Resp:    Temp:      Recent blood pressures:  147-130/73-84    Physical Exam:    Constitutional: She appears well-developed and well-nourished.       Cardiovascular:  Normal rate and regular rhythm.             Pulmonary/Chest: Effort normal and breath sounds normal.        Abdominal: Soft. She exhibits abdominal incision.             Musculoskeletal:      Comments: Sequential compression hose in place.  No significant edema noted.       Neurological: She is alert. She has normal reflexes.    Skin: Skin is warm and dry.        Recent Laboratory Results:    Results    Collected Updated Procedure    11/26/2023 0623 11/26/2023 0722 Magnesium [[id number]]   (Abnormal)   Blood    Component Value Units   Magnesium 4.6 High  mg/dL            Magnesium [[id number]]   Blood    Component Value   No component results          11/25/2023 1959 11/25/2023 2026 Urinalysis, Reflex to Urine Culture Urine, Clean Catch [[id number]]    (Abnormal)   Urine, Clean Catch    Component Value Units   Specimen UA Urine, Clean Catch    Color, UA Yellow    Appearance, UA Clear    pH, UA 7.0    Specific Gravity, UA 1.020    Protein, UA Trace Abnormal      Glucose, UA Negative    Ketones, UA 1+ Abnormal     Bilirubin (UA) Negative    Occult Blood UA 1+ Abnormal     Nitrite, UA Negative    Urobilinogen, UA Negative EU/dL   Leukocytes, UA Negative           11/25/2023 1958 11/25/2023 2118 Magnesium [[id number]]   Blood    Component Value Units   Magnesium 1.8 mg/dL          11/25/2023 1958 11/25/2023 2118 Comprehensive metabolic panel [[id number]]   (Abnormal)   Blood    Component Value Units   Sodium 137 mmol/L   Potassium 3.9 mmol/L   Chloride 106 mmol/L   CO2 21 Low  mmol/L   Glucose 96 mg/dL   BUN 11 mg/dL   Creatinine 0.5 mg/dL   Calcium 9.2 mg/dL   Total Protein 6.5 g/dL   Albumin 3.4 Low  g/dL   Total Bilirubin 0.2  mg/dL   Alkaline Phosphatase 88 U/L   AST 25 U/L   ALT 22 U/L   eGFR >60.0 mL/min/1.73 m^2   Anion Gap 10 mmol/L          11/25/2023 1958 11/25/2023 2017 CBC auto differential [[id number]]   (Abnormal)    Blood    Component Value Units   WBC 13.08 High  K/uL   RBC 3.88 Low  M/uL   Hemoglobin 9.6 Low  g/dL   Hematocrit 29.8 Low  %   MCV 77 Low  fL   MCH 24.7 Low  pg   MCHC 32.2 g/dL   RDW 16.3 High  %   Platelets 425 K/uL   MPV 9.5 fL   Immature Granulocytes 0.8 High  %   Gran # (ANC) 10.2 High  K/uL   Immature Grans (Abs) 0.11 High   K/uL   Lymph # 1.7 K/uL   Mono # 0.8 K/uL   Eos # 0.2 K/uL   Baso # 0.05 K/uL   nRBC 0 /100 WBC   Gran % 77.9 High  %   Lymph % 13.1 Low  %   Mono % 6.1 %   Eosinophil % 1.7 %   Basophil % 0.4 %   Differential Method Automated           11/25/2023 1959 11/25/2023 2026 Urinalysis Microscopic [[id number]]    (Abnormal)      Component Value Units   RBC, UA 15 High  /hpf   WBC, UA 2 /hpf   Bacteria Negative /hpf   Squam Epithel, UA 1 /hpf   Hyaline Casts, UA 1 /lpf   Microscopic Comment SEE COMMENT           Assessment:        1. Preeclampsia in postpartum period    2. Elevated blood pressure reading        Assessment:  23 y.o. female     1. Postpartum preeclampsia with severe features   2. Postpartum hypertension      Plan:      The patient was seen evaluated her hospital room.  Her chart was reviewed.      At this time the patient is overall improved with lower blood pressures on hydralazine and Procardia.    Currently on IV magnesium with no signs of magnesium toxicity.    We will continue on oral hydralazine and Procardia  Continue to monitor for signs of magnesium issues with plans to discontinue magnesium at 12:00 p.m. (12:30 a.m. on 11/27/2023)  Expectant management at this time      The patient's questions were answered, and she is in agreement with the current plan.     Sanjiv Moss MD  Department OBGYN [facility]

## 2023-11-27 LAB — MAGNESIUM SERPL-MCNC: 3.8 MG/DL (ref 1.6–2.6)

## 2023-11-27 PROCEDURE — 25000003 PHARM REV CODE 250: Performed by: SPECIALIST

## 2023-11-27 PROCEDURE — 25000003 PHARM REV CODE 250: Performed by: OBSTETRICS & GYNECOLOGY

## 2023-11-27 PROCEDURE — 83735 ASSAY OF MAGNESIUM: CPT | Performed by: SPECIALIST

## 2023-11-27 PROCEDURE — 36415 COLL VENOUS BLD VENIPUNCTURE: CPT | Performed by: SPECIALIST

## 2023-11-27 PROCEDURE — 12000002 HC ACUTE/MED SURGE SEMI-PRIVATE ROOM

## 2023-11-27 PROCEDURE — 96366 THER/PROPH/DIAG IV INF ADDON: CPT

## 2023-11-27 RX ORDER — NIFEDIPINE 30 MG/1
60 TABLET, EXTENDED RELEASE ORAL DAILY
Status: DISCONTINUED | OUTPATIENT
Start: 2023-11-27 | End: 2023-11-28 | Stop reason: HOSPADM

## 2023-11-27 RX ORDER — NIFEDIPINE 10 MG/1
10 CAPSULE ORAL ONCE
Status: COMPLETED | OUTPATIENT
Start: 2023-11-27 | End: 2023-11-27

## 2023-11-27 RX ADMIN — HYDRALAZINE HYDROCHLORIDE 50 MG: 25 TABLET, FILM COATED ORAL at 06:11

## 2023-11-27 RX ADMIN — NIFEDIPINE 60 MG: 30 TABLET, FILM COATED, EXTENDED RELEASE ORAL at 08:11

## 2023-11-27 RX ADMIN — HYDRALAZINE HYDROCHLORIDE 50 MG: 25 TABLET, FILM COATED ORAL at 09:11

## 2023-11-27 RX ADMIN — NIFEDIPINE 10 MG: 10 CAPSULE ORAL at 03:11

## 2023-11-27 RX ADMIN — HYDRALAZINE HYDROCHLORIDE 50 MG: 25 TABLET, FILM COATED ORAL at 01:11

## 2023-11-27 NOTE — SUBJECTIVE & OBJECTIVE
Interval History:  Status post admission over the weekend for postpartum preeclampsia.  Patient is now feeling pretty good no more headaches no more nausea vomiting    Scheduled Meds:   hydrALAZINE  50 mg Oral Q8H    NIFEdipine  60 mg Oral Daily     Continuous Infusions:   lactated ringers Stopped (11/27/23 0038)    magnesium sulfate in water Stopped (11/27/23 0037)     PRN Meds:butalbital-acetaminophen-caffeine -40 mg, calcium gluconate, ibuprofen, melatonin, sodium chloride 0.9%    Review of patient's allergies indicates:  No Known Allergies    Objective:     Vital Signs (Most Recent):  Temp: 98 °F (36.7 °C) (11/27/23 0346)  Pulse: 86 (11/27/23 0624)  Resp: 18 (11/27/23 0000)  BP: 134/71 (11/27/23 0617)  SpO2: 99 % (11/27/23 0624) Vital Signs (24h Range):  Temp:  [97.8 °F (36.6 °C)-98.3 °F (36.8 °C)] 98 °F (36.7 °C)  Pulse:  [] 86  Resp:  [16-19] 18  SpO2:  [89 %-100 %] 99 %  BP: ()/(51-96) 134/71     Weight: 102 kg (224 lb 13.9 oz)  Body mass index is 42.49 kg/m².  Patient's last menstrual period was 02/22/2023 (approximate).    I&O (Last 24H):    Intake/Output Summary (Last 24 hours) at 11/27/2023 0739  Last data filed at 11/27/2023 0044  Gross per 24 hour   Intake 2189.58 ml   Output 3800 ml   Net -1610.42 ml         Laboratory:  CBC:   Recent Labs   Lab 11/25/23 1958   WBC 13.08*   RBC 3.88*   HGB 9.6*   HCT 29.8*      MCV 77*   MCH 24.7*   MCHC 32.2     CMP:   Recent Labs   Lab 11/25/23 1958   GLU 96   CALCIUM 9.2   ALBUMIN 3.4*   PROT 6.5      K 3.9   CO2 21*      BUN 11   CREATININE 0.5   ALKPHOS 88   ALT 22   AST 25   BILITOT 0.2          Physical Exam  Patient looks very pleasant and happy this morning.   Blood pressures this morning look improved.  She did receive a 10 mg hydralazine at 3:45 a.m. in the morning.  Magnesium got turned off at midnight.  Wound looks good clean dry and intact   Extremities trace edema no Homans sign  Review of Systems

## 2023-11-27 NOTE — ASSESSMENT & PLAN NOTE
Postpartum and postoperative 1 week admitted for severe hypertension in 3rd trimester and status post  section.  Patient is passing flatus and tolerating p.o..  Patient's headache and nausea vomiting have gone away.  Patient is status post the magnesium 7 hours now.  She is on hydralazine 50 mg 3 times a day and will get Procardia XL 60 mg this morning.  She did require a 10 mg rescue dose of Procardia at 3:45 a.m. in the morning.  We will see how the patient does on this dose today.  Possible discharge home tomorrow.  Patient reported she was compliant with her medications at home she might have forgotten to take 1 dose of the nifedipine.  Encouraged compliance with the blood pressure medicines.

## 2023-11-27 NOTE — NURSING
MD aware of two elevated BP's. MD ordered to retake BP 1 hour from scheduled hydralazine administration.    11/26/23 2200 11/26/23 2210 11/26/23 2215   Vital Signs   Pulse 94 106 100   Resp 18  --   --    SpO2  --  100 % 100 %   BP (!) 164/91  --   --    MAP (mmHg) 120  --   --       11/26/23 2216   Vital Signs   Pulse 101   Resp  --    SpO2  --    BP (!) 166/94  (Dr. Moss called for elevated BP's. MD ordered to retake pressure 1 hour from hydralazine admininstration)   MAP (mmHg) 122

## 2023-11-27 NOTE — PLAN OF CARE
[facility]  Initial Discharge Assessment       Primary Care Provider: June Zaragoza MD    Admission Diagnosis: Preeclampsia in postpartum period [O14.95]    Admission Date: 11/25/2023  Expected Discharge Date:     Pt is a 23-year-old female/male who arrived from home with Preeclampsia in postpartum period. Information verified as correct on facesheet. The assessment was completed at the patient's bedside.  Pt lives with Father,Raymond Dubose [phone]. Pt does not have a Living Will or Advance Directive. The Pt is oriented to person, places, and times. PCP last seen pt could not recall when.  Pt denies Coumadin, dialysis, DME, HH, and has not been readmitted into the hospital in the last thirty days.  Pt is capable of performing ADLs without assistance. Pt drivers to and from medical appointments. Pt reports she takes medication as prescribed; pharmacy used [facility].  Pt verbalized plan to discharge home via family transport. Pt has no other needs to be addressed at this time. CM reviewed the chart and will continue to monitor.         Transition of Care Barriers: None    Payor: MEDICAID / Plan: [employer] [facility] / Product Type: Managed Medicaid /     Extended Emergency Contact Information  Primary Emergency Contact: Sonal Pham  Address: [address] [place] [address] [facility]  Home Phone: [phone]  Relation: Grandparent  Secondary Emergency Contact: Raymond Olivas  Address: [address] [place] [address] [facility]  Home Phone: [phone]  Mobile Phone: [phone]  Relation: Father    Discharge Plan A: Home with family  Discharge Plan B: Home      [facility] [facility] [place] - [address] [place]  [address] [facility] [address]  Phone: [phone] Fax: [phone]    [id number] [place] - [address] [facility]  [address]  [facility] [address]  Phone: [phone] Fax: [phone]      Initial Assessment (most recent)       Adult Discharge Assessment - 11/27/23 1215          Discharge Assessment    Assessment Type Discharge Planning Assessment     Confirmed/corrected address, phone number and insurance Yes     Confirmed Demographics Correct on Facesheet     Source of Information patient     When was your last doctors appointment? --   can't recall    Does patient/caregiver understand observation status Yes     Communicated GALILEA with patient/caregiver Date not available/Unable to determine     Reason For Admission Preeclampsia in postpartum period     People in Home child(jennifer), dependent;significant other     Facility Arrived From: home     Do you expect to return to your current living situation? Yes     Do you have help at home or someone to help you manage your care at home? Yes     Who are your caregiver(s) and their phone number(s)? Sonal Pham/ grandparent [phone]     Prior to hospitilization cognitive status: Alert/Oriented     Current cognitive status: Alert/Oriented     Equipment Currently Used at Home none     Readmission within 30 days? Yes     Patient currently being followed by outpatient case management? No     Do you currently have service(s) that help you manage your care at home? No     Do you take prescription medications? Yes     Do you have prescription coverage? Yes     Coverage Payor: MEDICAID - [facility] -     Do you have any problems affording any of your prescribed medications? No     Is the patient taking medications as prescribed? yes     Who is going to help you get home at discharge? Raymond Dubose/Father [phone]     How do you get to doctors appointments? family or friend will provide     Are you on dialysis? No     Do you take coumadin? No     DME Needed Upon Discharge  none     Discharge Plan discussed with: Patient     Transition of Care Barriers None      Discharge Plan A Home with family     Discharge Plan B Home

## 2023-11-27 NOTE — NURSING
RN found pt sleeping with baby in hospital bed. Pt and spouse educated on SIDS and encouraged to have baby sleep in bedside bassinet while pt sleeps. Pt and spouse verbalized understanding.

## 2023-11-27 NOTE — NURSING
Dr. Moss notified of two elevated BP's. New orders received for 10mg Procardia PO stat and modify daily Procardia to 60mg.    11/27/23 0315 11/27/23 0331   Vital Signs   Pulse 94 91   SpO2 100 %  --    BP (!) 173/90 (!) 176/94  (Dr. Moss phoned for elevated BP's)   MAP (mmHg) 124 126

## 2023-11-27 NOTE — PROGRESS NOTES
[facility]  Obstetrics & Gynecology  Progress Note    Patient Name: Zoran Dubose  MRN: [id number]  Admission Date: 11/25/2023  Primary Care Provider: June Zaragoza MD  Principal Problem: Preeclampsia in postpartum period    Subjective:     HPI:  No notes on file    Interval History:  Status post admission over the weekend for postpartum preeclampsia.  Patient is now feeling pretty good no more headaches no more nausea vomiting    Scheduled Meds:   hydrALAZINE  50 mg Oral Q8H    NIFEdipine  60 mg Oral Daily     Continuous Infusions:   lactated ringers Stopped (11/27/23 0038)    magnesium sulfate in water Stopped (11/27/23 0037)     PRN Meds:butalbital-acetaminophen-caffeine -40 mg, calcium gluconate, ibuprofen, melatonin, sodium chloride 0.9%    Review of patient's allergies indicates:  No Known Allergies    Objective:     Vital Signs (Most Recent):  Temp: 98 °F (36.7 °C) (11/27/23 0346)  Pulse: 86 (11/27/23 0624)  Resp: 18 (11/27/23 0000)  BP: 134/71 (11/27/23 0617)  SpO2: 99 % (11/27/23 0624) Vital Signs (24h Range):  Temp:  [97.8 °F (36.6 °C)-98.3 °F (36.8 °C)] 98 °F (36.7 °C)  Pulse:  [] 86  Resp:  [16-19] 18  SpO2:  [89 %-100 %] 99 %  BP: ()/(51-96) 134/71     Weight: 102 kg (224 lb 13.9 oz)  Body mass index is 42.49 kg/m².  Patient's last menstrual period was 02/22/2023 (approximate).    I&O (Last 24H):    Intake/Output Summary (Last 24 hours) at 11/27/2023 0739  Last data filed at 11/27/2023 0044  Gross per 24 hour   Intake 2189.58 ml   Output 3800 ml   Net -1610.42 ml         Laboratory:  CBC:   Recent Labs   Lab 11/25/23 1958   WBC 13.08*   RBC 3.88*   HGB 9.6*   HCT 29.8*      MCV 77*   MCH 24.7*   MCHC 32.2     CMP:   Recent Labs   Lab 11/25/23 1958   GLU 96   CALCIUM 9.2   ALBUMIN 3.4*   PROT 6.5      K 3.9   CO2 21*      BUN 11   CREATININE 0.5   ALKPHOS 88   ALT 22   AST 25   BILITOT 0.2          Physical Exam  Patient looks very pleasant  and happy this morning.   Blood pressures this morning look improved.  She did receive a 10 mg hydralazine at 3:45 a.m. in the morning.  Magnesium got turned off at midnight.  Wound looks good clean dry and intact   Extremities trace edema no Homans sign  Review of Systems    Assessment/Plan:     * Preeclampsia in postpartum period  Postpartum and postoperative 1 week admitted for severe hypertension in 3rd trimester and status post  section.  Patient is passing flatus and tolerating p.o..  Patient's headache and nausea vomiting have gone away.  Patient is status post the magnesium 7 hours now.  She is on hydralazine 50 mg 3 times a day and will get Procardia XL 60 mg this morning.  She did require a 10 mg rescue dose of Procardia at 3:45 a.m. in the morning.  We will see how the patient does on this dose today.  Possible discharge home tomorrow.  Patient reported she was compliant with her medications at home she might have forgotten to take 1 dose of the nifedipine.  Encouraged compliance with the blood pressure medicines.        Marya Spain MD  Obstetrics & Gynecology  [facility]

## 2023-11-27 NOTE — NURSING TRANSFER
Nursing Transfer Note             Reason patient is being transferred: postpartum     Transfer To: 2102     Transfer via wheelchair     Transfer with all personal belongings and family     Transported by DANIEL Smith, rn  Medicines sent:na  Any special needs or follow-up needed: none  Chart send with patient: Yes     Notified: pts family accompanying pt     Patient reassessed at: 0935       Upon arrival to floor: patient oriented to room, call bell in reach and bed in lowest position

## 2023-11-28 VITALS
OXYGEN SATURATION: 98 % | DIASTOLIC BLOOD PRESSURE: 78 MMHG | HEIGHT: 61 IN | TEMPERATURE: 98 F | WEIGHT: 224.88 LBS | RESPIRATION RATE: 18 BRPM | HEART RATE: 84 BPM | SYSTOLIC BLOOD PRESSURE: 146 MMHG | BODY MASS INDEX: 42.46 KG/M2

## 2023-11-28 PROCEDURE — 25000003 PHARM REV CODE 250: Performed by: OBSTETRICS & GYNECOLOGY

## 2023-11-28 PROCEDURE — 25000003 PHARM REV CODE 250: Performed by: SPECIALIST

## 2023-11-28 RX ADMIN — HYDRALAZINE HYDROCHLORIDE 50 MG: 25 TABLET, FILM COATED ORAL at 06:11

## 2023-11-28 RX ADMIN — NIFEDIPINE 60 MG: 30 TABLET, FILM COATED, EXTENDED RELEASE ORAL at 08:11

## 2023-11-28 NOTE — DISCHARGE SUMMARY
"[facility]  Obstetrics & Gynecology  Discharge Summary    Patient Name: Zoran Dubose  MRN: [id number]  Admission Date: 11/25/2023  Hospital Length of Stay: 1 days  Discharge Date and Time:  11/28/2023 8:04 AM  Attending Physician: Marya Spain MD   Discharging Provider: Marya Spain MD  Primary Care Provider: June Zaragoza MD    HPI:  No notes on file    Hospital Course:  No notes on file    Goals of Care Treatment Preferences:  Code Status: Full Code      * No surgery found *     Consults (From admission, onward)          Status Ordering Provider     Inpatient consult to Obstetrics / Gynecology  Once        Provider:  Neville Palmer MD    Acknowledged LADONNA PARKER            Significant Diagnostic Studies: Labs: CMP No results for input(s): "NA", "K", "CL", "CO2", "GLU", "BUN", "CREATININE", "CALCIUM", "PROT", "ALBUMIN", "BILITOT", "ALKPHOS", "AST", "ALT", "ANIONGAP", "ESTGFRAFRICA", "EGFRNONAA" in the last 48 hours. and CBC No results for input(s): "WBC", "HGB", "HCT", "PLT" in the last 48 hours.  Lab Results   Component Value Date    GROUPTRH A POS 11/19/2023     Physical exam:  Patient feeling well no headaches   Blood pressure is running 130s to 140s over 60s to low 90s occasionally   No physical exam changes     Assessment and plan:   Postpartum preeclampsia status post magnesium.  Patient is doing well now with hydralazine 50 mg 3 times a day and Procardia XL 60 mg daily.  I discussed with the patient about how to take this medication so that she is spacing it out to 4 times a day dosing over the 2 medications    Pending Diagnostic Studies:       None          Final Active Diagnoses:    Diagnosis Date Noted POA    PRINCIPAL PROBLEM:  Preeclampsia in postpartum period [O14.95] 11/27/2023 Unknown      Problems Resolved During this Admission:        Discharged Condition: good    Disposition: Home or Self Care    Follow Up:    Patient Instructions:      Diet Adult Regular "     No driving until:   Order Comments: 14 days for vaginal delivery  28 days for  Section     No dressing needed     Leave dressing on - Keep it clean, dry, and intact until clinic visit     Activity as tolerated   Order Comments: Pelvic rest x 6 weeks     Medications:  Reconciled Home Medications:      Medication List        ASK your doctor about these medications      acetaminophen 650 MG Tbsr  Commonly known as: TYLENOL  Take 1 tablet (650 mg total) by mouth every 8 (eight) hours.     dexchlorphen-phenylephrine-DM 1-5-10 mg/5 mL Syrp  Commonly known as: POLYTUSSIN DM(DEXCHLORPHENRMN)  Take 10 mLs by mouth every 4 (four) hours as needed.     fluticasone propionate 50 mcg/actuation nasal spray  Commonly known as: FLONASE  1 spray (50 mcg total) by Each Nostril route once daily.     hydrALAZINE 50 MG tablet  Commonly known as: APRESOLINE  Take 1 tablet (50 mg total) by mouth every 8 (eight) hours.     ibuprofen 600 MG tablet  Commonly known as: ADVIL,MOTRIN  Take 1 tablet (600 mg total) by mouth every 6 (six) hours as needed for Pain.     NIFEdipine 30 MG (OSM) 24 hr tablet  Commonly known as: PROCARDIA-XL  Take 1 tablet (30 mg total) by mouth once daily.     ondansetron 4 MG tablet  Commonly known as: ZOFRAN  Take 1 tablet (4 mg total) by mouth every 6 (six) hours.     oxyCODONE-acetaminophen 5-325 mg per tablet  Commonly known as: PERCOCET  Take 1 tablet by mouth every 4 (four) hours as needed for Pain.     PRENATAL 1+1 ORAL  Take 1 tablet by mouth once daily.     promethazine 25 MG tablet  Commonly known as: PHENERGAN  Take 1 tablet (25 mg total) by mouth every 6 (six) hours as needed for Nausea.              Marya Spain MD  Obstetrics & Gynecology  [facility]

## 2023-11-28 NOTE — PLAN OF CARE
Problem: Adult Inpatient Plan of Care  Goal: Plan of Care Review  Outcome: Met  Goal: Patient-Specific Goal (Individualized)  Outcome: Met  Goal: Absence of Hospital-Acquired Illness or Injury  Outcome: Met  Goal: Optimal Comfort and Wellbeing  Outcome: Met  Goal: Readiness for Transition of Care  Outcome: Met     Problem: Bariatric Environmental Safety  Goal: Safety Maintained with Care  Outcome: Met     Problem:  Fall Injury Risk  Goal: Absence of Fall, Infant Drop and Related Injury  Outcome: Met     Problem: Hypertensive Disorders in Pregnancy  Goal: Maternal-Fetal Stabilization  Outcome: Met

## 2023-11-28 NOTE — PLAN OF CARE
POC discussed. Patient in no apparent distress. VSS. Ambulating and voiding without difficulty. No acute events this shift. No additional needs or concerns at this time.

## 2023-11-28 NOTE — NURSING
Thorough discharge and education provided to patient. Patient verbalized understanding of education, follow up appointments, how to start/resume medications, where to  medications. No questions or concerns. Discharge instructions placed in white folder. IV and tele disconnected.

## 2024-02-19 PROBLEM — O13.3 GESTATIONAL HYPERTENSION, THIRD TRIMESTER: Status: RESOLVED | Noted: 2023-11-19 | Resolved: 2024-02-19

## 2024-04-08 LAB
C TRACH RRNA SPEC QL PROBE: NEGATIVE
HBV SURFACE AG SERPL QL IA: NEGATIVE
HCV AB SERPL QL IA: NEGATIVE
HIV 1+2 AB+HIV1 P24 AG SERPL QL IA: NEGATIVE
N GONORRHOEAE, AMPLIFIED DNA: NEGATIVE
RPR: NORMAL
RUBELLA IMMUNE STATUS: NORMAL

## 2024-08-21 LAB — GLUCOSE SERPL-MCNC: 92 MG/DL

## 2024-09-13 DIAGNOSIS — I10 HYPERTENSION: Primary | ICD-10-CM

## 2024-09-16 ENCOUNTER — HOSPITAL ENCOUNTER (OUTPATIENT)
Facility: HOSPITAL | Age: 24
Discharge: HOME | End: 2024-09-16
Attending: SPECIALIST | Admitting: SPECIALIST
Payer: MEDICAID

## 2024-09-16 VITALS — DIASTOLIC BLOOD PRESSURE: 68 MMHG | HEART RATE: 88 BPM | SYSTOLIC BLOOD PRESSURE: 121 MMHG

## 2024-09-16 DIAGNOSIS — I10 HYPERTENSION: ICD-10-CM

## 2024-09-16 PROCEDURE — 59025 FETAL NON-STRESS TEST: CPT

## 2024-09-16 NOTE — DISCHARGE INSTRUCTIONS
Keep your scheduled appointment with your provider.    Call your Doctor if you have any of the following:  Temperature above 100 degrees  Nausea, vomiting and/or diarrhea  Severe headache, dizziness, or blurred vision  Notable increase in swelling of hands or feet  Notable swelling of face and lips  Difficulty, pain or burning with urination  Foul smelling vaginal discharge  Decreased fetal movement    Come to the hospital if you have any of the following symptoms:  Your water breaks  More than 4-6 contractions in 1 hour for 2 or more hours  Vaginal bleeding like a period    After 28 weeks, you should feel 10 distinct fetal movements within a 2 hour period.    It is recommended that you drink 1/2 a gallon of water each day.  Tea, Soda and Juice are  in addition to this.    Labor and Delivery Phone number: [phone]    If you need to be seen on Labor and delivery between the hours of 6pm and 7am, please enter through the Emergency room entrance.

## 2024-09-16 NOTE — PROGRESS NOTES
09/16/24 1651   Vital Signs   /68   Pulse 88   Non Stress Test - General   $ NST Procedure Charge Completed   Indications/Pt Reported Complaint CHTN   Test Duration (min) 40 min   Number of Fetuses 1   Acoustic Stimulator No   Contractions Not present   Nonstress Test Baby A   Variability Moderate   Decels None   Baseline    Interpretation Baby A   Nonstress Test Interpretation Reactive   Overall Impression Reassuring   Comments Dr. Spain reviewed pt strip.

## 2024-09-30 ENCOUNTER — HOSPITAL ENCOUNTER (OUTPATIENT)
Facility: HOSPITAL | Age: 24
Discharge: HOME | End: 2024-09-30
Attending: SPECIALIST | Admitting: SPECIALIST
Payer: MEDICAID

## 2024-09-30 VITALS — DIASTOLIC BLOOD PRESSURE: 66 MMHG | SYSTOLIC BLOOD PRESSURE: 128 MMHG | HEART RATE: 88 BPM

## 2024-09-30 DIAGNOSIS — I10 HYPERTENSION: ICD-10-CM

## 2024-09-30 PROCEDURE — 59025 FETAL NON-STRESS TEST: CPT

## 2024-09-30 NOTE — DISCHARGE INSTRUCTIONS
Keep your scheduled appointment with your provider.    Call your Doctor if you have any of the following:  Temperature above 100 degrees  Nausea, vomiting and/or diarrhea  Severe headache, dizziness, or blurred vision  Notable increase in swelling of hands or feet  Notable swelling of face and lips  Difficulty, pain or burning with urination  Foul smelling vaginal discharge  Decreased fetal movement    Come to the hospital if you have any of the following symptoms:  Your water breaks  More than 4-6 contractions in 1 hour for 2 or more hours  Vaginal bleeding like a period    After 28 weeks, you should feel 10 distinct fetal movements within a 2 hour period.    It is recommended that you drink 1/2 a gallon of water each day.  Tea, Soda and Juice are  in addition to this.    Labor and Delivery Phone number: [phone]    If you need to be seen on Labor and delivery between the hours of 6pm and 7am, please enter through the Emergency room entrance.

## 2024-10-03 ENCOUNTER — HOSPITAL ENCOUNTER (OUTPATIENT)
Facility: HOSPITAL | Age: 24
Discharge: HOME | End: 2024-10-03
Attending: SPECIALIST | Admitting: SPECIALIST
Payer: MEDICAID

## 2024-10-03 VITALS — SYSTOLIC BLOOD PRESSURE: 128 MMHG | HEART RATE: 84 BPM | DIASTOLIC BLOOD PRESSURE: 70 MMHG

## 2024-10-03 DIAGNOSIS — I10 HYPERTENSION: ICD-10-CM

## 2024-10-03 PROCEDURE — 59025 FETAL NON-STRESS TEST: CPT

## 2024-10-03 NOTE — DISCHARGE INSTRUCTIONS
Keep your scheduled appointment with your provider.    Call your Doctor if you have any of the following:  Temperature above 100 degrees  Nausea, vomiting and/or diarrhea  Severe headache, dizziness, or blurred vision  Notable increase in swelling of hands or feet  Notable swelling of face and lips  Difficulty, pain or burning with urination  Foul smelling vaginal discharge  Decreased fetal movement    Come to the hospital if you have any of the following symptoms:  Your water breaks  More than 4-6 contractions in 1 hour for 2 or more hours  Vaginal bleeding like a period    After 28 weeks, you should feel 10 distinct fetal movements within a 2 hour period.    It is recommended that you drink 1/2 a gallon of water each day.  Tea, Soda and Juice are  in addition to this.    Labor and Delivery Phone number: [phone]    If you need to be seen on Labor and delivery between the hours of 6pm and 7am, please enter through the Emergency room entrance.

## 2024-10-04 DIAGNOSIS — Z98.891 PREVIOUS CESAREAN SECTION: Primary | ICD-10-CM

## 2024-10-10 ENCOUNTER — HOSPITAL ENCOUNTER (OUTPATIENT)
Facility: HOSPITAL | Age: 24
Discharge: HOME | End: 2024-10-10
Attending: SPECIALIST | Admitting: SPECIALIST
Payer: MEDICAID

## 2024-10-10 VITALS — DIASTOLIC BLOOD PRESSURE: 71 MMHG | HEART RATE: 96 BPM | SYSTOLIC BLOOD PRESSURE: 139 MMHG

## 2024-10-10 DIAGNOSIS — I10 HYPERTENSION: ICD-10-CM

## 2024-10-10 PROCEDURE — 59025 FETAL NON-STRESS TEST: CPT

## 2024-10-10 NOTE — DISCHARGE INSTRUCTIONS
Keep your scheduled appointment with your provider.    Call your Doctor if you have any of the following:  Temperature above 100 degrees  Nausea, vomiting and/or diarrhea  Severe headache, dizziness, or blurred vision  Notable increase in swelling of hands or feet  Notable swelling of face and lips  Difficulty, pain or burning with urination  Foul smelling vaginal discharge  Decreased fetal movement    Come to the hospital if you have any of the following symptoms:  Your water breaks  More than 4-6 contractions in 1 hour for 2 or more hours  Vaginal bleeding like a period    After 28 weeks, you should feel 10 distinct fetal movements within a 2 hour period.    It is recommended that you drink 1/2 a gallon of water each day.  Tea, Soda and Juice are  in addition to this.    Labor and Delivery Phone number: [phone]    If you need to be seen on Labor and delivery between the hours of 6pm and 7am, please enter through the Emergency room entrance.

## 2024-10-11 ENCOUNTER — HOSPITAL ENCOUNTER (OUTPATIENT)
Facility: HOSPITAL | Age: 24
Discharge: HOME | End: 2024-10-12
Attending: SPECIALIST | Admitting: SPECIALIST
Payer: MEDICAID

## 2024-10-11 DIAGNOSIS — R10.9 ABDOMINAL PAIN: ICD-10-CM

## 2024-10-11 LAB
CREAT UR-MCNC: 155.9 MG/DL (ref 15–325)
PROT UR-MCNC: 48 MG/DL (ref 6–15)
PROT/CREAT UR: 0.31 MG/G{CREAT} (ref 0–0.2)

## 2024-10-11 PROCEDURE — 82570 ASSAY OF URINE CREATININE: CPT | Performed by: SPECIALIST

## 2024-10-11 PROCEDURE — 25000003 PHARM REV CODE 250: Performed by: SPECIALIST

## 2024-10-11 PROCEDURE — 99285 EMERGENCY DEPT VISIT HI MDM: CPT

## 2024-10-11 PROCEDURE — 80307 DRUG TEST PRSMV CHEM ANLYZR: CPT | Mod: 91 | Performed by: SPECIALIST

## 2024-10-11 PROCEDURE — 84156 ASSAY OF PROTEIN URINE: CPT | Performed by: SPECIALIST

## 2024-10-11 PROCEDURE — 80307 DRUG TEST PRSMV CHEM ANLYZR: CPT | Performed by: SPECIALIST

## 2024-10-11 RX ORDER — ACETAMINOPHEN 500 MG
1000 TABLET ORAL EVERY 6 HOURS PRN
Status: DISCONTINUED | OUTPATIENT
Start: 2024-10-11 | End: 2024-10-12 | Stop reason: HOSPADM

## 2024-10-11 RX ADMIN — ACETAMINOPHEN 1000 MG: 500 TABLET, FILM COATED ORAL at 11:10

## 2024-10-12 VITALS
SYSTOLIC BLOOD PRESSURE: 134 MMHG | BODY MASS INDEX: 45.31 KG/M2 | HEIGHT: 61 IN | TEMPERATURE: 98 F | OXYGEN SATURATION: 99 % | WEIGHT: 240 LBS | HEART RATE: 82 BPM | RESPIRATION RATE: 18 BRPM | DIASTOLIC BLOOD PRESSURE: 72 MMHG

## 2024-10-12 PROCEDURE — 63600175 PHARM REV CODE 636 W HCPCS: Performed by: SPECIALIST

## 2024-10-12 PROCEDURE — 99211 OFF/OP EST MAY X REQ PHY/QHP: CPT

## 2024-10-12 RX ORDER — BETAMETHASONE SODIUM PHOSPHATE AND BETAMETHASONE ACETATE 3; 3 MG/ML; MG/ML
12 INJECTION, SUSPENSION INTRA-ARTICULAR; INTRALESIONAL; INTRAMUSCULAR; SOFT TISSUE ONCE
Status: COMPLETED | OUTPATIENT
Start: 2024-10-12 | End: 2024-10-12

## 2024-10-12 RX ADMIN — BETAMETHASONE ACETATE AND BETAMETHASONE SODIUM PHOSPHATE 12 MG: 3; 3 INJECTION, SUSPENSION INTRA-ARTICULAR; INTRALESIONAL; INTRAMUSCULAR; SOFT TISSUE at 12:10
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
## 2024-10-12 NOTE — DISCHARGE INSTRUCTIONS
Keep your scheduled appointment with your provider.    Call your Doctor if you have any of the following:  Temperature above 100 degrees  Nausea, vomiting and/or diarrhea  Severe headache, dizziness, or blurred vision  Notable increase in swelling of hands or feet  Notable swelling of face and lips  Difficulty, pain or burning with urination  Foul smelling vaginal discharge  Decreased fetal movement    Come to the hospital if you have any of the following symptoms:  Your water breaks  More than 4-6 contractions in 1 hour for 2 or more hours  Vaginal bleeding like a period    After 28 weeks, you should feel 10 distinct fetal movements within a 2 hour period.    It is recommended that you drink 1/2 a gallon of water each day.  Tea, Soda and Juice are  in addition to this.    Labor and Delivery Phone number: [phone]    If you need to be seen on Labor and delivery between the hours of 6pm and 7am, please enter through the Emergency room entrance.

## 2024-10-14 ENCOUNTER — HOSPITAL ENCOUNTER (INPATIENT)
Facility: HOSPITAL | Age: 24
LOS: 6 days | Discharge: HOME | End: 2024-10-20
Attending: SPECIALIST | Admitting: SPECIALIST
Payer: MEDICAID

## 2024-10-14 DIAGNOSIS — O13.3 GESTATIONAL HYPERTENSION, THIRD TRIMESTER: ICD-10-CM

## 2024-10-14 DIAGNOSIS — I10 HYPERTENSION: ICD-10-CM

## 2024-10-14 DIAGNOSIS — O14.13 PREECLAMPSIA, SEVERE, THIRD TRIMESTER: Primary | ICD-10-CM

## 2024-10-14 LAB
ALBUMIN SERPL BCP-MCNC: 3.2 G/DL (ref 3.5–5.2)
ALP SERPL-CCNC: 108 U/L (ref 55–135)
ALT SERPL W/O P-5'-P-CCNC: 11 U/L (ref 10–44)
ANION GAP SERPL CALC-SCNC: 4 MMOL/L (ref 8–16)
AST SERPL-CCNC: 16 U/L (ref 10–40)
BASOPHILS # BLD AUTO: 0.05 K/UL (ref 0–0.2)
BASOPHILS NFR BLD: 0.4 % (ref 0–1.9)
BILIRUB SERPL-MCNC: 0.2 MG/DL (ref 0.1–1)
BUN SERPL-MCNC: 4 MG/DL (ref 6–20)
CALCIUM SERPL-MCNC: 8.8 MG/DL (ref 8.7–10.5)
CHLORIDE SERPL-SCNC: 108 MMOL/L (ref 95–110)
CO2 SERPL-SCNC: 22 MMOL/L (ref 23–29)
CREAT SERPL-MCNC: 0.6 MG/DL (ref 0.5–1.4)
DIFFERENTIAL METHOD BLD: ABNORMAL
EOSINOPHIL # BLD AUTO: 0.2 K/UL (ref 0–0.5)
EOSINOPHIL NFR BLD: 1.4 % (ref 0–8)
ERYTHROCYTE [DISTWIDTH] IN BLOOD BY AUTOMATED COUNT: 16.4 % (ref 11.5–14.5)
EST. GFR  (NO RACE VARIABLE): >60 ML/MIN/1.73 M^2
GLUCOSE SERPL-MCNC: 107 MG/DL (ref 70–110)
HCT VFR BLD AUTO: 27.4 % (ref 37–48.5)
HGB BLD-MCNC: 8.4 G/DL (ref 12–16)
IMM GRANULOCYTES # BLD AUTO: 0.11 K/UL (ref 0–0.04)
IMM GRANULOCYTES NFR BLD AUTO: 0.9 % (ref 0–0.5)
LYMPHOCYTES # BLD AUTO: 2.6 K/UL (ref 1–4.8)
LYMPHOCYTES NFR BLD: 20 % (ref 18–48)
MCH RBC QN AUTO: 21.6 PG (ref 27–31)
MCHC RBC AUTO-ENTMCNC: 30.7 G/DL (ref 32–36)
MCV RBC AUTO: 71 FL (ref 82–98)
MONOCYTES # BLD AUTO: 0.9 K/UL (ref 0.3–1)
MONOCYTES NFR BLD: 7.1 % (ref 4–15)
NEUTROPHILS # BLD AUTO: 9 K/UL (ref 1.8–7.7)
NEUTROPHILS NFR BLD: 70.2 % (ref 38–73)
NRBC BLD-RTO: 0 /100 WBC
PLATELET # BLD AUTO: 418 K/UL (ref 150–450)
PMV BLD AUTO: 10.5 FL (ref 9.2–12.9)
POTASSIUM SERPL-SCNC: 3.9 MMOL/L (ref 3.5–5.1)
PROT 24H UR-MRATE: 324 MG/SPEC (ref 6–100)
PROT SERPL-MCNC: 6.3 G/DL (ref 6–8.4)
PROT UR-MCNC: 18 MG/DL (ref 0–15)
RBC # BLD AUTO: 3.88 M/UL (ref 4–5.4)
SODIUM SERPL-SCNC: 134 MMOL/L (ref 136–145)
URATE SERPL-MCNC: 4.1 MG/DL (ref 2.4–5.7)
URINE COLLECTION DURATION: 24 HR
URINE VOLUME: 1800 ML
WBC # BLD AUTO: 12.88 K/UL (ref 3.9–12.7)

## 2024-10-14 PROCEDURE — 25000003 PHARM REV CODE 250: Performed by: SPECIALIST

## 2024-10-14 PROCEDURE — 63600175 PHARM REV CODE 636 W HCPCS: Performed by: SPECIALIST

## 2024-10-14 PROCEDURE — 96372 THER/PROPH/DIAG INJ SC/IM: CPT

## 2024-10-14 PROCEDURE — 36415 COLL VENOUS BLD VENIPUNCTURE: CPT | Performed by: SPECIALIST

## 2024-10-14 PROCEDURE — 85025 COMPLETE CBC W/AUTO DIFF WBC: CPT | Performed by: SPECIALIST

## 2024-10-14 PROCEDURE — 80053 COMPREHEN METABOLIC PANEL: CPT | Performed by: SPECIALIST

## 2024-10-14 PROCEDURE — 99211 OFF/OP EST MAY X REQ PHY/QHP: CPT | Mod: 25

## 2024-10-14 PROCEDURE — 12000002 HC ACUTE/MED SURGE SEMI-PRIVATE ROOM

## 2024-10-14 PROCEDURE — 84550 ASSAY OF BLOOD/URIC ACID: CPT | Performed by: SPECIALIST

## 2024-10-14 PROCEDURE — 84156 ASSAY OF PROTEIN URINE: CPT | Performed by: SPECIALIST

## 2024-10-14 RX ORDER — HYDROXYZINE 50 MG/ML
25 INJECTION, SOLUTION INTRAMUSCULAR ONCE
Status: COMPLETED | OUTPATIENT
Start: 2024-10-14 | End: 2024-10-14

## 2024-10-14 RX ORDER — BETAMETHASONE SODIUM PHOSPHATE AND BETAMETHASONE ACETATE 3; 3 MG/ML; MG/ML
12 INJECTION, SUSPENSION INTRA-ARTICULAR; INTRALESIONAL; INTRAMUSCULAR; SOFT TISSUE ONCE
Status: COMPLETED | OUTPATIENT
Start: 2024-10-14 | End: 2024-10-14

## 2024-10-14 RX ORDER — TRAMADOL HYDROCHLORIDE 50 MG/1
50 TABLET ORAL EVERY 6 HOURS PRN
Status: DISCONTINUED | OUTPATIENT
Start: 2024-10-14 | End: 2024-10-20 | Stop reason: HOSPADM

## 2024-10-14 RX ORDER — ACETAMINOPHEN 500 MG
1000 TABLET ORAL ONCE
Status: COMPLETED | OUTPATIENT
Start: 2024-10-14 | End: 2024-10-14

## 2024-10-14 RX ORDER — NAPROXEN SODIUM 220 MG/1
81 TABLET, FILM COATED ORAL DAILY
Status: ON HOLD | COMMUNITY
End: 2024-10-20 | Stop reason: HOSPADM

## 2024-10-14 RX ADMIN — HYDROXYZINE HYDROCHLORIDE 25 MG: 50 INJECTION, SOLUTION INTRAMUSCULAR at 07:10

## 2024-10-14 RX ADMIN — BETAMETHASONE ACETATE AND BETAMETHASONE SODIUM PHOSPHATE 12 MG: 3; 3 INJECTION, SUSPENSION INTRA-ARTICULAR; INTRALESIONAL; INTRAMUSCULAR; SOFT TISSUE at 06:10

## 2024-10-14 RX ADMIN — TRAMADOL HYDROCHLORIDE 50 MG: 50 TABLET, COATED ORAL at 08:10

## 2024-10-14 RX ADMIN — ACETAMINOPHEN 1000 MG: 500 TABLET, FILM COATED ORAL at 06:10

## 2024-10-14 NOTE — NURSING
Pt presented to unit to drop off 24 hr urine after missing NST today d/t transportation issues. After arrival, pt states she was supposed to return for steroid shot. 2nd dose was due early Sunday morning (12 AM) but pt did not return to unit on Sunday. 12 mg celestone IM given at 1804. While administering injection, pt complaining of HA and nausea. Recommended pt stay to be triaged, pt agrees. Transferred to triage room.

## 2024-10-14 NOTE — NURSING
[facility]  Department of Obstetrics and Gynecology  Labor & Delivery Triage Assessment    PATIENT NAME: Tye Dubose  MRN: [id number]  TODAY'S DATE: 10/14/2024    CHIEF COMPLAINT: steroid shot  Pt came in for celestone and to drop off 24 hr urine. While on unit c/o HA and nausea.    OB History    Para Term  AB Living   2 1 1 0 0 1   SAB IAB Ectopic Multiple Live Births   0 0 0 0 1      # Outcome Date GA Lbr Zack/2nd Weight Sex Type Anes PTL Lv   2 Current            1 Term 24 39w2d  3.55 kg (7 lb 13.2 oz) M CS-LTranv Gen, EPI N HARI      Name: DEBBY,BOY TYE      Apgar1: 1  Apgar5: 5     Past Medical History:   Diagnosis Date    Hypertension      Past Surgical History:   Procedure Laterality Date     SECTION N/A 2023    Procedure:  SECTION;  Surgeon: Marya Spain MD;  Location: [facility] L&D;  Service: OB/GYN;  Laterality: N/A;         VITAL SIGNS - ABNORMAL VITALS INCLUDE TEMP >100.4,RR <12 or >26, SUSTAINED MATERNAL PULSE <60 or >120     VITAL SIGNS (Most Recent)       VITAL SIGNS  BP elevated  HEADACHE    yes     VOMITING    nausea  VISUAL DISTURBANCES  no  EPIGASTRIC PAIN        no  PROTEINURIA 2+ or MORE             24 hr urine sent    EDEMA FACE/EXTREMITIES            no    FETAL MOVEMENT     FETAL MOVEMENT: Decreased  FETAL HEART RATE BASELINE =  150  normal  FETAL HEART RATE VARIABILITY:  Moderate  FETAL HEART RATE ACCELERATIONS FOR GESTATIONAL AGE: present  FETAL HEART RATE DECELERATIONS: none    ABDOMINAL PAIN/CRAMPING/CONTRACTIONS     Patient is not complaining of abdominal pain/cramping/contractions.    RUPTURE OF MEMBRANES OR LEAKING OF AMNIOTIC FLUID     Patient denies ROM or leaking of amniotic fluid.    VAGINAL BLEEDING     Patient denies vaginal bleeding.    VAGINAL EXAM     DILATION:  na  STATION:  na  EFFACEMENT:  na  PRESENTATION:  na    VAGINAL EXAM DEFERRED DUE TO:  Not in Labor    PAIN PRESENT ON ARRIVAL     ONSET:  "  today  LOCATION:  head  PAIN SCALE (0-10):  7  DESCRIPTION: "Headache not relieved by BC powder"    Interventions     Oral fluids given and tolerated.      PATIENT DISPOSITION     Report given to  Malika Pelayo RN  [facility]  10/14/2024              "

## 2024-10-15 LAB
ALBUMIN SERPL BCP-MCNC: 3.3 G/DL (ref 3.5–5.2)
ALP SERPL-CCNC: 108 U/L (ref 55–135)
ALT SERPL W/O P-5'-P-CCNC: 11 U/L (ref 10–44)
ANION GAP SERPL CALC-SCNC: 7 MMOL/L (ref 8–16)
AST SERPL-CCNC: 17 U/L (ref 10–40)
BASOPHILS # BLD AUTO: 0.02 K/UL (ref 0–0.2)
BASOPHILS NFR BLD: 0.1 % (ref 0–1.9)
BILIRUB SERPL-MCNC: 0.3 MG/DL (ref 0.1–1)
BUN SERPL-MCNC: 4 MG/DL (ref 6–20)
CALCIUM SERPL-MCNC: 9 MG/DL (ref 8.7–10.5)
CHLORIDE SERPL-SCNC: 108 MMOL/L (ref 95–110)
CO2 SERPL-SCNC: 21 MMOL/L (ref 23–29)
CREAT SERPL-MCNC: 0.6 MG/DL (ref 0.5–1.4)
DIFFERENTIAL METHOD BLD: ABNORMAL
EOSINOPHIL # BLD AUTO: 0 K/UL (ref 0–0.5)
EOSINOPHIL NFR BLD: 0 % (ref 0–8)
ERYTHROCYTE [DISTWIDTH] IN BLOOD BY AUTOMATED COUNT: 16.2 % (ref 11.5–14.5)
EST. GFR  (NO RACE VARIABLE): >60 ML/MIN/1.73 M^2
GLUCOSE SERPL-MCNC: 128 MG/DL (ref 70–110)
HCT VFR BLD AUTO: 28.4 % (ref 37–48.5)
HGB BLD-MCNC: 8.6 G/DL (ref 12–16)
IMM GRANULOCYTES # BLD AUTO: 0.13 K/UL (ref 0–0.04)
IMM GRANULOCYTES NFR BLD AUTO: 0.8 % (ref 0–0.5)
LYMPHOCYTES # BLD AUTO: 1.7 K/UL (ref 1–4.8)
LYMPHOCYTES NFR BLD: 10.8 % (ref 18–48)
MCH RBC QN AUTO: 21.5 PG (ref 27–31)
MCHC RBC AUTO-ENTMCNC: 30.3 G/DL (ref 32–36)
MCV RBC AUTO: 71 FL (ref 82–98)
MONOCYTES # BLD AUTO: 0.5 K/UL (ref 0.3–1)
MONOCYTES NFR BLD: 3 % (ref 4–15)
NEUTROPHILS # BLD AUTO: 13.4 K/UL (ref 1.8–7.7)
NEUTROPHILS NFR BLD: 85.3 % (ref 38–73)
NRBC BLD-RTO: 0 /100 WBC
PLATELET # BLD AUTO: 440 K/UL (ref 150–450)
PMV BLD AUTO: 10.9 FL (ref 9.2–12.9)
POTASSIUM SERPL-SCNC: 4.6 MMOL/L (ref 3.5–5.1)
PROT SERPL-MCNC: 6.8 G/DL (ref 6–8.4)
RBC # BLD AUTO: 4 M/UL (ref 4–5.4)
SODIUM SERPL-SCNC: 136 MMOL/L (ref 136–145)
URATE SERPL-MCNC: 4.2 MG/DL (ref 2.4–5.7)
WBC # BLD AUTO: 15.68 K/UL (ref 3.9–12.7)

## 2024-10-15 PROCEDURE — 80053 COMPREHEN METABOLIC PANEL: CPT | Performed by: SPECIALIST

## 2024-10-15 PROCEDURE — 84550 ASSAY OF BLOOD/URIC ACID: CPT | Performed by: SPECIALIST

## 2024-10-15 PROCEDURE — 36415 COLL VENOUS BLD VENIPUNCTURE: CPT | Performed by: SPECIALIST

## 2024-10-15 PROCEDURE — G0378 HOSPITAL OBSERVATION PER HR: HCPCS

## 2024-10-15 PROCEDURE — 25000003 PHARM REV CODE 250: Performed by: SPECIALIST

## 2024-10-15 PROCEDURE — 63600175 PHARM REV CODE 636 W HCPCS: Performed by: SPECIALIST

## 2024-10-15 PROCEDURE — 85025 COMPLETE CBC W/AUTO DIFF WBC: CPT | Performed by: SPECIALIST

## 2024-10-15 PROCEDURE — 12000002 HC ACUTE/MED SURGE SEMI-PRIVATE ROOM

## 2024-10-15 RX ORDER — LABETALOL 100 MG/1
100 TABLET, FILM COATED ORAL EVERY 12 HOURS
Status: DISCONTINUED | OUTPATIENT
Start: 2024-10-15 | End: 2024-10-16

## 2024-10-15 RX ORDER — BUTALBITAL, ACETAMINOPHEN AND CAFFEINE 50; 325; 40 MG/1; MG/1; MG/1
1 TABLET ORAL EVERY 8 HOURS PRN
Status: DISCONTINUED | OUTPATIENT
Start: 2024-10-15 | End: 2024-10-20 | Stop reason: HOSPADM

## 2024-10-15 RX ORDER — CALCIUM CARBONATE 200(500)MG
500 TABLET,CHEWABLE ORAL 2 TIMES DAILY PRN
Status: DISCONTINUED | OUTPATIENT
Start: 2024-10-15 | End: 2024-10-20 | Stop reason: HOSPADM

## 2024-10-15 RX ADMIN — SODIUM CHLORIDE 125 MG: 9 INJECTION, SOLUTION INTRAVENOUS at 07:10

## 2024-10-15 RX ADMIN — BUTALBITAL, ACETAMINOPHEN AND CAFFEINE 1 TABLET: 325; 50; 40 TABLET ORAL at 10:10

## 2024-10-15 RX ADMIN — LABETALOL HYDROCHLORIDE 100 MG: 100 TABLET, FILM COATED ORAL at 09:10

## 2024-10-15 RX ADMIN — CALCIUM CARBONATE (ANTACID) CHEW TAB 500 MG 500 MG: 500 CHEW TAB at 09:10

## 2024-10-15 RX ADMIN — TRAMADOL HYDROCHLORIDE 50 MG: 50 TABLET, COATED ORAL at 07:10

## 2024-10-15 RX ADMIN — TRAMADOL HYDROCHLORIDE 50 MG: 50 TABLET, COATED ORAL at 05:10

## 2024-10-15 NOTE — NURSING
0982- Notified Dr. Spain that pt has not had iron infusion or MRI yet d/t to no  for pt's infant child that is at bedside. Orders given by Dr. Spain to transfer pt to 2100.     1123- Report given to TAVON An

## 2024-10-15 NOTE — SUBJECTIVE & OBJECTIVE
Obstetric HPI:  24-year-old  2 para 1 at 34 weeks 4 days gestation presents for nonstress test yesterday.  She brought her 24 hour urine.  She started having labile elevated blood pressures.  And then was complaining of headache.  Patient was admitted as observation and labs were drawn.  Patient received hydroxyzine and tramadol and continues to have a headache this morning.  It is somewhat less but still present.      Patient has some noncompliance and this pregnancy with late prenatal care and missing at nonstress test.  Patient has a history of severe uncontrolled blood pressure at 39 weeks last pregnancy and her last baby was born about than a year ago.  She reports she just has difficulty getting to the hospital and getting childcare    OB History    Para Term  AB Living   2 1 1 0 0 1   SAB IAB Ectopic Multiple Live Births   0 0 0 0 1      # Outcome Date GA Lbr Zack/2nd Weight Sex Type Anes PTL Lv   2 Current            1 Term 24 39w2d  3.55 kg (7 lb 13.2 oz) M CS-LTranv Gen, EPI N HARI      Name: CHAO GUARDADO      Apgar1: 1  Apgar5: 5     Past Medical History:   Diagnosis Date    Hypertension      Past Surgical History:   Procedure Laterality Date     SECTION N/A 2023    Procedure:  SECTION;  Surgeon: Marya Spain MD;  Location: [facility] L&D;  Service: OB/GYN;  Laterality: N/A;       PTA Medications   Medication Sig    aspirin 81 MG Chew Take 81 mg by mouth once daily.    prenatal 25/iron/folate 6/dha (PRENA1 ORAL) Take by mouth.    acetaminophen (TYLENOL) 650 MG TbSR Take 1 tablet (650 mg total) by mouth every 8 (eight) hours.    dexchlorphen-phenylephrine-DM (POLYTUSSIN DM) 1-5-10 mg/5 mL Syrp Take 10 mLs by mouth every 4 (four) hours as needed.    fluticasone propionate (FLONASE) 50 mcg/actuation nasal spray 1 spray (50 mcg total) by Each Nostril route once daily.    hydrALAZINE (APRESOLINE) 50 MG tablet Take 1 tablet (50 mg total) by mouth every 8 (eight)  hours.    hydrALAZINE (APRESOLINE) 50 MG tablet Take 1 tablet (50 mg total) by mouth 3 (three) times daily.    ibuprofen (ADVIL,MOTRIN) 600 MG tablet Take 1 tablet (600 mg total) by mouth every 6 (six) hours as needed for Pain.    NIFEdipine (PROCARDIA XL) 60 MG (OSM) 24 hr tablet Take 1 tablet (60 mg total) by mouth once daily.    NIFEdipine (PROCARDIA-XL) 30 MG (OSM) 24 hr tablet Take 1 tablet (30 mg total) by mouth once daily.    ondansetron (ZOFRAN) 4 MG tablet Take 1 tablet (4 mg total) by mouth every 6 (six) hours.    oxyCODONE-acetaminophen (PERCOCET) 5-325 mg per tablet Take 1 tablet by mouth every 4 (four) hours as needed for Pain.    prenatal vit/iron fum/folic ac (PRENATAL 1+1 ORAL) Take 1 tablet by mouth once daily.    promethazine (PHENERGAN) 25 MG tablet Take 1 tablet (25 mg total) by mouth every 6 (six) hours as needed for Nausea.       Review of patient's allergies indicates:  No Known Allergies     Family History       Problem Relation (Age of Onset)    Diabetes Maternal Grandmother, Paternal Grandmother    Hypertension Maternal Grandmother, Paternal Grandmother, Paternal Grandfather    No Known Problems Mother    Seizures Father          Tobacco Use    Smoking status: Never    Smokeless tobacco: Not on file   Substance and Sexual Activity    Alcohol use: No    Drug use: Not Currently    Sexual activity: Yes     Review of Systems   Objective:     Vital Signs (Most Recent):  Temp: 98.1 °F (36.7 °C) (10/15/24 0747)  Pulse: 84 (10/15/24 0801)  Resp: 16 (10/15/24 0747)  BP: (!) 99/55 (10/15/24 0747)  SpO2: 98 % (10/15/24 0801) Vital Signs (24h Range):  Temp:  [98.1 °F (36.7 °C)-98.3 °F (36.8 °C)] 98.1 °F (36.7 °C)  Pulse:  [] 84  Resp:  [16-18] 16  SpO2:  [93 %-100 %] 98 %  BP: ()/() 99/55     Weight: 108.9 kg (240 lb)  Body mass index is 43.9 kg/m².    FHT:  140s with accelerations no decelerations  TOCO:  No contractions     Physical Exam  Patient is alert and oriented still having a  frontal headache   Blood pressures overnight have been absolutely beautiful actually ranging from  over 50s and low 70s.  She did have elevated blood pressures last night ranging from 130-170 over 70s to low 100s.  Patient never received any blood pressure medicine last night because blood pressures came down.      Patient is afebrile  Abdomen is gravid nontender palpation   Extremities have no edema and reflexes are 1+          Significant Labs:  Lab Results   Component Value Date    GROUPTRH A POS 11/19/2023    STREPBCULT unknown 11/19/2023       CBC:   Recent Labs   Lab 10/15/24  0251   WBC 15.68*   RBC 4.00   HGB 8.6*   HCT 28.4*      MCV 71*   MCH 21.5*   MCHC 30.3*     CMP:   Recent Labs   Lab 10/15/24  0251   *   CALCIUM 9.0   ALBUMIN 3.3*   PROT 6.8      K 4.6   CO2 21*      BUN 4*   CREATININE 0.6   ALKPHOS 108   ALT 11   AST 17   BILITOT 0.3     I have personallly reviewed all pertinent lab results from the last 24 hours.

## 2024-10-15 NOTE — ASSESSMENT & PLAN NOTE
Intrauterine pregnancy at 3 4 weeks 4 days with gestational hypertension and headache   Proteinuria is 324 mg of protein   Patient has a history of uncontrolled elevated severe blood pressures at 39 weeks gestation previous pregnancy which was a year ago.  Blood pressure is currently look good patient continues to have headache.  I am going to order an MRI of the brain.  Patient has anemia and she has trouble getting to appointments some going to go ahead and order an iron infusion today here as I do not suspect she will be able to get this done outpatient   CBC and CMP are normal as far as liver functions and platelets.  Patient will receive Fioricet this morning for headache and see if that helps better than the hydroxyzine and tramadol that was previously ordered.

## 2024-10-15 NOTE — PLAN OF CARE
[facility]  Initial Discharge Assessment       Primary Care Provider: June Zaragoza MD       met with Pt at bedside to complete discharge assessment. Pt AAOx4s. Demographics, PCP, and insurance verified. No home health. No dialysis. Pt reports ability to complete ADLs without assistance. Pt verbalized plan to discharge home via family transport. Pt has no other needs to be addressed at this time.    Pt can't have procedure completed until someone comes to  her toddler.    Admission Diagnosis: Previous  section [Z98.891]  Hypertension [I10]    Admission Date: 10/14/2024  Expected Discharge Date:     Transition of Care Barriers: None    Payor: MEDICAID / Plan: [facility] / Product Type: Managed Medicaid /     Extended Emergency Contact Information  Primary Emergency Contact: Koffi Tucker  Address: [address] [place] [address] [place]  Mobile Phone: [phone]  Relation: Other  Secondary Emergency Contact: Raymond Olivas  Address: [address] [place] [address] [facility]  Home Phone: [phone]  Mobile Phone: [phone]  Relation: Father    Discharge Plan A: Home with family  Discharge Plan B: Home      [facility] [place], [place] - [address] [place]  [address] [facility] [address]  Phone: [phone] Fax: [phone]    [id number] [place], [place] - [address] AT [facility] & [address] [address]  Phone: [phone] Fax: [phone]      Initial Assessment (most recent)       Adult Discharge Assessment - 10/15/24 1449          Discharge Assessment    Assessment Type Discharge Planning Assessment     Confirmed/corrected address, phone number and insurance Yes     Confirmed Demographics Correct on Facesheet     Source of Information patient     When was your last doctors appointment?  --   Pt does not have a PCP but see her OBGYN    Communicated GALILEA with patient/caregiver No     Reason For Admission Gestational hypertension, third trimester     People in Home child(jennifer), dependent;friend(s)     Facility Arrived From: home     Do you expect to return to your current living situation? Yes     Do you have help at home or someone to help you manage your care at home? No     Prior to hospitilization cognitive status: Alert/Oriented     Current cognitive status: Alert/Oriented     Walking or Climbing Stairs Difficulty no     Dressing/Bathing Difficulty no     Home Accessibility not wheelchair accessible     Equipment Currently Used at Home none     Readmission within 30 days? Yes     Patient currently being followed by outpatient case management? No     Do you currently have service(s) that help you manage your care at home? No     Do you take prescription medications? No     Do you have prescription coverage? Yes     Coverage Payor: MEDICAID - [facility] ([facility]) -     Do you have any problems affording any of your prescribed medications? No     Is the patient taking medications as prescribed? no     If no, which medications is patient not taking? Pt is not currently prescribed any medication at this time.     Who is going to help you get home at discharge? Medical Transportation or Family     How do you get to doctors appointments? health plan transportation     Are you on dialysis? No     Do you take coumadin? No     Discharge Plan A Home with family     Discharge Plan B Home     DME Needed Upon Discharge  none     Discharge Plan discussed with: Patient     Transition of Care Barriers None        Physical Activity    On average, how many days per week do you engage in moderate to strenuous exercise (like a brisk walk)? 7 days     On average, how many minutes do you engage in exercise at this level? 60 min        Financial Resource Strain    How hard is it for you to pay for the  very basics like food, housing, medical care, and heating? Not hard at all        Housing Stability    In the last 12 months, was there a time when you were not able to pay the mortgage or rent on time? No   Pt lives with family and friends    At any time in the past 12 months, were you homeless or living in a shelter (including now)? No        Transportation Needs    Has the lack of transportation kept you from medical appointments, meetings, work or from getting things needed for daily living? Yes, it has kept me from medical appointments or from getting my medications.        Food Insecurity    Within the past 12 months, you worried that your food would run out before you got the money to buy more. Sometimes true     Within the past 12 months, the food you bought just didn't last and you didn't have money to get more. Sometimes true        Stress    Do you feel stress - tense, restless, nervous, or anxious, or unable to sleep at night because your mind is troubled all the time - these days? To some extent        Social Isolation    How often do you feel lonely or isolated from those around you?  Sometimes        Alcohol Use    Q1: How often do you have a drink containing alcohol? Never     Q2: How many drinks containing alcohol do you have on a typical day when you are drinking? Patient does not drink     Q3: How often do you have six or more drinks on one occasion? Never        Utilities    In the past 12 months has the electric, gas, oil, or water company threatened to shut off services in your home? No        Health Literacy    How often do you need to have someone help you when you read instructions, pamphlets, or other written material from your doctor or pharmacy? Never

## 2024-10-15 NOTE — PLAN OF CARE
10/15/24 1449   Discharge Assessment   Assessment Type Discharge Planning Assessment   Confirmed/corrected address, phone number and insurance Yes   Confirmed Demographics Correct on Facesheet   Source of Information patient   When was your last doctors appointment?   (Pt does not have a PCP but see her OBGYN)   Communicated GALILEA with patient/caregiver No   Reason For Admission Gestational hypertension, third trimester   People in Home child(jennifer), dependent;friend(s)   Facility Arrived From: home   Do you expect to return to your current living situation? Yes   Do you have help at home or someone to help you manage your care at home? No   Prior to hospitilization cognitive status: Alert/Oriented   Current cognitive status: Alert/Oriented   Walking or Climbing Stairs Difficulty no   Dressing/Bathing Difficulty no   Home Accessibility not wheelchair accessible   Equipment Currently Used at Home none   Readmission within 30 days? Yes   Patient currently being followed by outpatient case management? No   Do you currently have service(s) that help you manage your care at home? No   Do you take prescription medications? No   Do you have prescription coverage? Yes   Coverage Payor: MEDICAID - [facility] ([facility]) -   Do you have any problems affording any of your prescribed medications? No   Is the patient taking medications as prescribed? no   If no, which medications is patient not taking? Pt is not currently prescribed any medication at this time.   Who is going to help you get home at discharge? Medical Transportation or Family   How do you get to doctors appointments? health plan transportation   Are you on dialysis? No   Do you take coumadin? No   Discharge Plan A Home with family   Discharge Plan B Home   DME Needed Upon Discharge  none   Discharge Plan discussed with: Patient   Transition of Care Barriers None   Physical Activity   On average, how many days per week do you engage in moderate to  strenuous exercise (like a brisk walk)? 7 days   On average, how many minutes do you engage in exercise at this level? 60 min   Financial Resource Strain   How hard is it for you to pay for the very basics like food, housing, medical care, and heating? Not hard   Housing Stability   In the last 12 months, was there a time when you were not able to pay the mortgage or rent on time? N  (Pt lives with family and friends)   At any time in the past 12 months, were you homeless or living in a shelter (including now)? N   Transportation Needs   Has the lack of transportation kept you from medical appointments, meetings, work or from getting things needed for daily living? Yes, it has kept me from medical appointments or from getting my medications.   Food Insecurity   Within the past 12 months, you worried that your food would run out before you got the money to buy more. Sometimes   Within the past 12 months, the food you bought just didn't last and you didn't have money to get more. Sometimes   Stress   Do you feel stress - tense, restless, nervous, or anxious, or unable to sleep at night because your mind is troubled all the time - these days? To some exte   Social Isolation   How often do you feel lonely or isolated from those around you?  Sometimes   Alcohol Use   Q1: How often do you have a drink containing alcohol? Never   Q2: How many drinks containing alcohol do you have on a typical day when you are drinking? None   Q3: How often do you have six or more drinks on one occasion? Never   Utilities   In the past 12 months has the electric, gas, oil, or water company threatened to shut off services in your home? No   Health Literacy   How often do you need to have someone help you when you read instructions, pamphlets, or other written material from your doctor or pharmacy? Never

## 2024-10-15 NOTE — H&P
[facility]  Obstetrics  History & Physical    Patient Name: Zoran Guardado  MRN: [id number]  Admission Date: 10/14/2024  Primary Care Provider: June Zaragoza MD    Subjective:     Principal Problem:Gestational hypertension, third trimester    History of Present Illness:  No notes on file    Obstetric HPI:  24-year-old  2 para 1 at 34 weeks 4 days gestation presents for nonstress test yesterday.  She brought her 24 hour urine.  She started having labile elevated blood pressures.  And then was complaining of headache.  Patient was admitted as observation and labs were drawn.  Patient received hydroxyzine and tramadol and continues to have a headache this morning.  It is somewhat less but still present.      Patient has some noncompliance and this pregnancy with late prenatal care and missing at nonstress test.  Patient has a history of severe uncontrolled blood pressure at 39 weeks last pregnancy and her last baby was born about than a year ago.  She reports she just has difficulty getting to the hospital and getting childcare    OB History    Para Term  AB Living   2 1 1 0 0 1   SAB IAB Ectopic Multiple Live Births   0 0 0 0 1      # Outcome Date GA Lbr Zack/2nd Weight Sex Type Anes PTL Lv   2 Current            1 Term 24 39w2d  3.55 kg (7 lb 13.2 oz) M CS-LTranv Gen, EPI N HARI      Name: CHAO GUARDADO      Apgar1: 1  Apgar5: 5     Past Medical History:   Diagnosis Date    Hypertension      Past Surgical History:   Procedure Laterality Date     SECTION N/A 2023    Procedure:  SECTION;  Surgeon: Marya Spain MD;  Location: [facility]&D;  Service: OB/GYN;  Laterality: N/A;       PTA Medications   Medication Sig    aspirin 81 MG Chew Take 81 mg by mouth once daily.    prenatal 25/iron/folate 6/dha (PRENA1 ORAL) Take by mouth.    acetaminophen (TYLENOL) 650 MG TbSR Take 1 tablet (650 mg total) by mouth every 8 (eight) hours.     dexchlorphen-phenylephrine-DM (POLYTUSSIN DM) 1-5-10 mg/5 mL Syrp Take 10 mLs by mouth every 4 (four) hours as needed.    fluticasone propionate (FLONASE) 50 mcg/actuation nasal spray 1 spray (50 mcg total) by Each Nostril route once daily.    hydrALAZINE (APRESOLINE) 50 MG tablet Take 1 tablet (50 mg total) by mouth every 8 (eight) hours.    hydrALAZINE (APRESOLINE) 50 MG tablet Take 1 tablet (50 mg total) by mouth 3 (three) times daily.    ibuprofen (ADVIL,MOTRIN) 600 MG tablet Take 1 tablet (600 mg total) by mouth every 6 (six) hours as needed for Pain.    NIFEdipine (PROCARDIA XL) 60 MG (OSM) 24 hr tablet Take 1 tablet (60 mg total) by mouth once daily.    NIFEdipine (PROCARDIA-XL) 30 MG (OSM) 24 hr tablet Take 1 tablet (30 mg total) by mouth once daily.    ondansetron (ZOFRAN) 4 MG tablet Take 1 tablet (4 mg total) by mouth every 6 (six) hours.    oxyCODONE-acetaminophen (PERCOCET) 5-325 mg per tablet Take 1 tablet by mouth every 4 (four) hours as needed for Pain.    prenatal vit/iron fum/folic ac (PRENATAL 1+1 ORAL) Take 1 tablet by mouth once daily.    promethazine (PHENERGAN) 25 MG tablet Take 1 tablet (25 mg total) by mouth every 6 (six) hours as needed for Nausea.       Review of patient's allergies indicates:  No Known Allergies     Family History       Problem Relation (Age of Onset)    Diabetes Maternal Grandmother, Paternal Grandmother    Hypertension Maternal Grandmother, Paternal Grandmother, Paternal Grandfather    No Known Problems Mother    Seizures Father          Tobacco Use    Smoking status: Never    Smokeless tobacco: Not on file   Substance and Sexual Activity    Alcohol use: No    Drug use: Not Currently    Sexual activity: Yes     Review of Systems   Objective:     Vital Signs (Most Recent):  Temp: 98.1 °F (36.7 °C) (10/15/24 0747)  Pulse: 84 (10/15/24 0801)  Resp: 16 (10/15/24 0747)  BP: (!) 99/55 (10/15/24 0747)  SpO2: 98 % (10/15/24 0801) Vital Signs (24h Range):  Temp:  [98.1 °F (36.7  °C)-98.3 °F (36.8 °C)] 98.1 °F (36.7 °C)  Pulse:  [] 84  Resp:  [16-18] 16  SpO2:  [93 %-100 %] 98 %  BP: ()/() 99/55     Weight: 108.9 kg (240 lb)  Body mass index is 43.9 kg/m².    FHT:  140s with accelerations no decelerations  TOCO:  No contractions     Physical Exam  Patient is alert and oriented still having a frontal headache   Blood pressures overnight have been absolutely beautiful actually ranging from  over 50s and low 70s.  She did have elevated blood pressures last night ranging from 130-170 over 70s to low 100s.  Patient never received any blood pressure medicine last night because blood pressures came down.      Patient is afebrile  Abdomen is gravid nontender palpation   Extremities have no edema and reflexes are 1+          Significant Labs:  Lab Results   Component Value Date    GROUPTRH A POS 2023    STREPBCULT unknown 2023       CBC:   Recent Labs   Lab 10/15/24  0251   WBC 15.68*   RBC 4.00   HGB 8.6*   HCT 28.4*      MCV 71*   MCH 21.5*   MCHC 30.3*     CMP:   Recent Labs   Lab 10/15/24  0251   *   CALCIUM 9.0   ALBUMIN 3.3*   PROT 6.8      K 4.6   CO2 21*      BUN 4*   CREATININE 0.6   ALKPHOS 108   ALT 11   AST 17   BILITOT 0.3     I have personallly reviewed all pertinent lab results from the last 24 hours.  Assessment/Plan:     24 y.o. female  at 34w4d for:    * Gestational hypertension, third trimester  Intrauterine pregnancy at 3 4 weeks 4 days with gestational hypertension and headache   Proteinuria is 324 mg of protein   Patient has a history of uncontrolled elevated severe blood pressures at 39 weeks gestation previous pregnancy which was a year ago.  Blood pressure is currently look good patient continues to have headache.  I am going to order an MRI of the brain.  Patient has anemia and she has trouble getting to appointments some going to go ahead and order an iron infusion today here as I do not suspect she will  be able to get this done outpatient   CBC and CMP are normal as far as liver functions and platelets.  Patient will receive Fioricet this morning for headache and see if that helps better than the hydroxyzine and tramadol that was previously ordered.    Patient is status post Celestone x2 at this point for fetal lung maturity    Marya Spain MD  Obstetrics  [facility]

## 2024-10-16 ENCOUNTER — ANESTHESIA EVENT (OUTPATIENT)
Dept: OBSTETRICS AND GYNECOLOGY | Facility: HOSPITAL | Age: 24
End: 2024-10-16
Payer: MEDICAID

## 2024-10-16 ENCOUNTER — ANESTHESIA (OUTPATIENT)
Dept: OBSTETRICS AND GYNECOLOGY | Facility: HOSPITAL | Age: 24
End: 2024-10-16
Payer: MEDICAID

## 2024-10-16 LAB
ABO + RH BLD: NORMAL
ALBUMIN SERPL BCP-MCNC: 3.3 G/DL (ref 3.5–5.2)
ALP SERPL-CCNC: 107 U/L (ref 55–135)
ALT SERPL W/O P-5'-P-CCNC: 10 U/L (ref 10–44)
AMPHET+METHAMPHET UR QL: NEGATIVE
ANION GAP SERPL CALC-SCNC: 8 MMOL/L (ref 8–16)
AST SERPL-CCNC: 15 U/L (ref 10–40)
BARBITURATES UR QL SCN>200 NG/ML: NEGATIVE
BASOPHILS # BLD AUTO: 0.02 K/UL (ref 0–0.2)
BASOPHILS NFR BLD: 0.1 % (ref 0–1.9)
BENZODIAZ UR QL SCN>200 NG/ML: NEGATIVE
BILIRUB SERPL-MCNC: 0.2 MG/DL (ref 0.1–1)
BLD GP AB SCN CELLS X3 SERPL QL: NORMAL
BUN SERPL-MCNC: 5 MG/DL (ref 6–20)
BUPRENORPHINE UR QL: NEGATIVE
BZE UR QL SCN: NEGATIVE
CALCIUM SERPL-MCNC: 8.8 MG/DL (ref 8.7–10.5)
CANNABINOIDS UR QL SCN: NEGATIVE
CHLORIDE SERPL-SCNC: 106 MMOL/L (ref 95–110)
CO2 SERPL-SCNC: 21 MMOL/L (ref 23–29)
CREAT SERPL-MCNC: 0.6 MG/DL (ref 0.5–1.4)
CREAT UR-MCNC: 146.9 MG/DL (ref 15–325)
DIFFERENTIAL METHOD BLD: ABNORMAL
EOSINOPHIL # BLD AUTO: 0.1 K/UL (ref 0–0.5)
EOSINOPHIL NFR BLD: 0.4 % (ref 0–8)
ERYTHROCYTE [DISTWIDTH] IN BLOOD BY AUTOMATED COUNT: 16.3 % (ref 11.5–14.5)
ERYTHROCYTE [DISTWIDTH] IN BLOOD BY AUTOMATED COUNT: 16.5 % (ref 11.5–14.5)
EST. GFR  (NO RACE VARIABLE): >60 ML/MIN/1.73 M^2
FENTANYL UR QL SCN: NORMAL
GLUCOSE SERPL-MCNC: 117 MG/DL (ref 70–110)
HCT VFR BLD AUTO: 27.6 % (ref 37–48.5)
HCT VFR BLD AUTO: 30.5 % (ref 37–48.5)
HGB BLD-MCNC: 8.2 G/DL (ref 12–16)
HGB BLD-MCNC: 9 G/DL (ref 12–16)
IMM GRANULOCYTES # BLD AUTO: 0.34 K/UL (ref 0–0.04)
IMM GRANULOCYTES NFR BLD AUTO: 2.1 % (ref 0–0.5)
LYMPHOCYTES # BLD AUTO: 3.2 K/UL (ref 1–4.8)
LYMPHOCYTES NFR BLD: 19.7 % (ref 18–48)
MCH RBC QN AUTO: 20.8 PG (ref 27–31)
MCH RBC QN AUTO: 20.9 PG (ref 27–31)
MCHC RBC AUTO-ENTMCNC: 29.5 G/DL (ref 32–36)
MCHC RBC AUTO-ENTMCNC: 29.7 G/DL (ref 32–36)
MCV RBC AUTO: 70 FL (ref 82–98)
MCV RBC AUTO: 70 FL (ref 82–98)
MONOCYTES # BLD AUTO: 1.2 K/UL (ref 0.3–1)
MONOCYTES NFR BLD: 7.2 % (ref 4–15)
NEUTROPHILS # BLD AUTO: 11.6 K/UL (ref 1.8–7.7)
NEUTROPHILS NFR BLD: 70.5 % (ref 38–73)
NRBC BLD-RTO: 1 /100 WBC
OPIATES UR QL SCN: NEGATIVE
PCP UR QL SCN>25 NG/ML: NEGATIVE
PLATELET # BLD AUTO: 439 K/UL (ref 150–450)
PLATELET # BLD AUTO: 460 K/UL (ref 150–450)
PMV BLD AUTO: 10.4 FL (ref 9.2–12.9)
PMV BLD AUTO: 10.6 FL (ref 9.2–12.9)
POTASSIUM SERPL-SCNC: 3.6 MMOL/L (ref 3.5–5.1)
PROT SERPL-MCNC: 6.7 G/DL (ref 6–8.4)
RBC # BLD AUTO: 3.92 M/UL (ref 4–5.4)
RBC # BLD AUTO: 4.33 M/UL (ref 4–5.4)
SODIUM SERPL-SCNC: 135 MMOL/L (ref 136–145)
TOXICOLOGY INFORMATION: NORMAL
TREPONEMA PALLIDUM IGG+IGM AB [PRESENCE] IN SERUM OR PLASMA BY IMMUNOASSAY: NONREACTIVE
WBC # BLD AUTO: 16.46 K/UL (ref 3.9–12.7)
WBC # BLD AUTO: 20.14 K/UL (ref 3.9–12.7)

## 2024-10-16 PROCEDURE — 80053 COMPREHEN METABOLIC PANEL: CPT | Performed by: SPECIALIST

## 2024-10-16 PROCEDURE — 25000003 PHARM REV CODE 250: Performed by: STUDENT IN AN ORGANIZED HEALTH CARE EDUCATION/TRAINING PROGRAM

## 2024-10-16 PROCEDURE — 86593 SYPHILIS TEST NON-TREP QUANT: CPT | Performed by: SPECIALIST

## 2024-10-16 PROCEDURE — 12000002 HC ACUTE/MED SURGE SEMI-PRIVATE ROOM

## 2024-10-16 PROCEDURE — 25000003 PHARM REV CODE 250: Performed by: SPECIALIST

## 2024-10-16 PROCEDURE — 27000670 HC SET COMBINED SPINAL AND EPIDURAL: Performed by: ANESTHESIOLOGY

## 2024-10-16 PROCEDURE — 36415 COLL VENOUS BLD VENIPUNCTURE: CPT | Performed by: SPECIALIST

## 2024-10-16 PROCEDURE — 71000033 HC RECOVERY, INTIAL HOUR: Performed by: SPECIALIST

## 2024-10-16 PROCEDURE — 86900 BLOOD TYPING SEROLOGIC ABO: CPT | Performed by: SPECIALIST

## 2024-10-16 PROCEDURE — 37000008 HC ANESTHESIA 1ST 15 MINUTES: Performed by: SPECIALIST

## 2024-10-16 PROCEDURE — 63600175 PHARM REV CODE 636 W HCPCS: Performed by: SPECIALIST

## 2024-10-16 PROCEDURE — 63600175 PHARM REV CODE 636 W HCPCS: Performed by: ANESTHESIOLOGY

## 2024-10-16 PROCEDURE — 27201423 OPTIME MED/SURG SUP & DEVICES STERILE SUPPLY: Performed by: SPECIALIST

## 2024-10-16 PROCEDURE — 3E0234Z INTRODUCTION OF SERUM, TOXOID AND VACCINE INTO MUSCLE, PERCUTANEOUS APPROACH: ICD-10-PCS | Performed by: OBSTETRICS & GYNECOLOGY

## 2024-10-16 PROCEDURE — 90715 TDAP VACCINE 7 YRS/> IM: CPT | Performed by: SPECIALIST

## 2024-10-16 PROCEDURE — 85025 COMPLETE CBC W/AUTO DIFF WBC: CPT | Performed by: SPECIALIST

## 2024-10-16 PROCEDURE — 25000003 PHARM REV CODE 250: Performed by: ANESTHESIOLOGY

## 2024-10-16 PROCEDURE — 90471 IMMUNIZATION ADMIN: CPT | Performed by: SPECIALIST

## 2024-10-16 PROCEDURE — 63600175 PHARM REV CODE 636 W HCPCS: Performed by: STUDENT IN AN ORGANIZED HEALTH CARE EDUCATION/TRAINING PROGRAM

## 2024-10-16 PROCEDURE — 63600175 PHARM REV CODE 636 W HCPCS: Mod: JZ,JG | Performed by: ANESTHESIOLOGY

## 2024-10-16 PROCEDURE — 36004725 HC OB OR TIME LEV III - EA ADD 15 MIN: Performed by: SPECIALIST

## 2024-10-16 PROCEDURE — 86850 RBC ANTIBODY SCREEN: CPT | Performed by: SPECIALIST

## 2024-10-16 PROCEDURE — 37000009 HC ANESTHESIA EA ADD 15 MINS: Performed by: SPECIALIST

## 2024-10-16 PROCEDURE — 71000039 HC RECOVERY, EACH ADD'L HOUR: Performed by: SPECIALIST

## 2024-10-16 PROCEDURE — 51702 INSERT TEMP BLADDER CATH: CPT

## 2024-10-16 PROCEDURE — 85027 COMPLETE CBC AUTOMATED: CPT | Performed by: SPECIALIST

## 2024-10-16 PROCEDURE — 36004724 HC OB OR TIME LEV III - 1ST 15 MIN: Performed by: SPECIALIST

## 2024-10-16 PROCEDURE — C1751 CATH, INF, PER/CENT/MIDLINE: HCPCS | Performed by: ANESTHESIOLOGY

## 2024-10-16 RX ORDER — MUPIROCIN 20 MG/G
OINTMENT TOPICAL 2 TIMES DAILY
Status: DISCONTINUED | OUTPATIENT
Start: 2024-10-16 | End: 2024-10-17

## 2024-10-16 RX ORDER — LABETALOL HYDROCHLORIDE 5 MG/ML
40 INJECTION, SOLUTION INTRAVENOUS ONCE AS NEEDED
Status: DISCONTINUED | OUTPATIENT
Start: 2024-10-16 | End: 2024-10-20 | Stop reason: HOSPADM

## 2024-10-16 RX ORDER — ONDANSETRON HYDROCHLORIDE 2 MG/ML
4 INJECTION, SOLUTION INTRAVENOUS ONCE
Status: COMPLETED | OUTPATIENT
Start: 2024-10-16 | End: 2024-10-16

## 2024-10-16 RX ORDER — HYDRALAZINE HYDROCHLORIDE 20 MG/ML
10 INJECTION INTRAMUSCULAR; INTRAVENOUS ONCE AS NEEDED
Status: DISCONTINUED | OUTPATIENT
Start: 2024-10-16 | End: 2024-10-20 | Stop reason: HOSPADM

## 2024-10-16 RX ORDER — LABETALOL HYDROCHLORIDE 5 MG/ML
20 INJECTION, SOLUTION INTRAVENOUS ONCE AS NEEDED
Status: DISCONTINUED | OUTPATIENT
Start: 2024-10-16 | End: 2024-10-20 | Stop reason: HOSPADM

## 2024-10-16 RX ORDER — NALBUPHINE HYDROCHLORIDE 10 MG/ML
5 INJECTION, SOLUTION INTRAMUSCULAR; INTRAVENOUS; SUBCUTANEOUS EVERY 4 HOURS PRN
Status: DISCONTINUED | OUTPATIENT
Start: 2024-10-16 | End: 2024-10-20 | Stop reason: HOSPADM

## 2024-10-16 RX ORDER — OXYCODONE AND ACETAMINOPHEN 5; 325 MG/1; MG/1
1 TABLET ORAL EVERY 4 HOURS PRN
Status: DISCONTINUED | OUTPATIENT
Start: 2024-10-17 | End: 2024-10-20 | Stop reason: HOSPADM

## 2024-10-16 RX ORDER — DOCUSATE SODIUM 100 MG/1
200 CAPSULE, LIQUID FILLED ORAL 2 TIMES DAILY
Status: DISCONTINUED | OUTPATIENT
Start: 2024-10-16 | End: 2024-10-20 | Stop reason: HOSPADM

## 2024-10-16 RX ORDER — SODIUM CHLORIDE, SODIUM LACTATE, POTASSIUM CHLORIDE, CALCIUM CHLORIDE 600; 310; 30; 20 MG/100ML; MG/100ML; MG/100ML; MG/100ML
INJECTION, SOLUTION INTRAVENOUS CONTINUOUS
Status: DISCONTINUED | OUTPATIENT
Start: 2024-10-16 | End: 2024-10-17

## 2024-10-16 RX ORDER — CARBOPROST TROMETHAMINE 250 UG/ML
250 INJECTION, SOLUTION INTRAMUSCULAR
Status: DISCONTINUED | OUTPATIENT
Start: 2024-10-16 | End: 2024-10-20 | Stop reason: HOSPADM

## 2024-10-16 RX ORDER — ONDANSETRON HYDROCHLORIDE 2 MG/ML
INJECTION, SOLUTION INTRAVENOUS
Status: DISCONTINUED | OUTPATIENT
Start: 2024-10-16 | End: 2024-10-16

## 2024-10-16 RX ORDER — OXYCODONE HYDROCHLORIDE 5 MG/1
10 TABLET ORAL EVERY 4 HOURS PRN
Status: DISCONTINUED | OUTPATIENT
Start: 2024-10-16 | End: 2024-10-20 | Stop reason: HOSPADM

## 2024-10-16 RX ORDER — OXYTOCIN-SODIUM CHLORIDE 0.9% IV SOLN 30 UNIT/500ML 30-0.9/5 UT/ML-%
95 SOLUTION INTRAVENOUS CONTINUOUS PRN
Status: DISCONTINUED | OUTPATIENT
Start: 2024-10-16 | End: 2024-10-20 | Stop reason: HOSPADM

## 2024-10-16 RX ORDER — CEFAZOLIN SODIUM 2 G/50ML
2 SOLUTION INTRAVENOUS
Status: DISCONTINUED | OUTPATIENT
Start: 2024-10-16 | End: 2024-10-20 | Stop reason: HOSPADM

## 2024-10-16 RX ORDER — TRANEXAMIC ACID 10 MG/ML
1000 INJECTION, SOLUTION INTRAVENOUS EVERY 30 MIN PRN
Status: DISCONTINUED | OUTPATIENT
Start: 2024-10-16 | End: 2024-10-20 | Stop reason: HOSPADM

## 2024-10-16 RX ORDER — MAGNESIUM SULFATE HEPTAHYDRATE 40 MG/ML
2 INJECTION, SOLUTION INTRAVENOUS CONTINUOUS
Status: DISCONTINUED | OUTPATIENT
Start: 2024-10-16 | End: 2024-10-17

## 2024-10-16 RX ORDER — FENTANYL CITRATE 50 UG/ML
INJECTION, SOLUTION INTRAMUSCULAR; INTRAVENOUS
Status: DISCONTINUED | OUTPATIENT
Start: 2024-10-16 | End: 2024-10-16

## 2024-10-16 RX ORDER — DIPHENOXYLATE HYDROCHLORIDE AND ATROPINE SULFATE 2.5; .025 MG/1; MG/1
2 TABLET ORAL EVERY 6 HOURS PRN
Status: DISCONTINUED | OUTPATIENT
Start: 2024-10-16 | End: 2024-10-20 | Stop reason: HOSPADM

## 2024-10-16 RX ORDER — PROCHLORPERAZINE EDISYLATE 5 MG/ML
5 INJECTION INTRAMUSCULAR; INTRAVENOUS EVERY 6 HOURS PRN
Status: DISCONTINUED | OUTPATIENT
Start: 2024-10-16 | End: 2024-10-20 | Stop reason: HOSPADM

## 2024-10-16 RX ORDER — MISOPROSTOL 200 UG/1
800 TABLET ORAL ONCE AS NEEDED
Status: DISCONTINUED | OUTPATIENT
Start: 2024-10-16 | End: 2024-10-20 | Stop reason: HOSPADM

## 2024-10-16 RX ORDER — CALCIUM GLUCONATE 98 MG/ML
1 INJECTION, SOLUTION INTRAVENOUS
Status: DISCONTINUED | OUTPATIENT
Start: 2024-10-16 | End: 2024-10-20 | Stop reason: HOSPADM

## 2024-10-16 RX ORDER — BUPIVACAINE HYDROCHLORIDE 7.5 MG/ML
INJECTION, SOLUTION EPIDURAL; RETROBULBAR
Status: COMPLETED | OUTPATIENT
Start: 2024-10-16 | End: 2024-10-16

## 2024-10-16 RX ORDER — OXYTOCIN-SODIUM CHLORIDE 0.9% IV SOLN 30 UNIT/500ML 30-0.9/5 UT/ML-%
30 SOLUTION INTRAVENOUS ONCE
Status: DISCONTINUED | OUTPATIENT
Start: 2024-10-16 | End: 2024-10-17

## 2024-10-16 RX ORDER — LABETALOL HYDROCHLORIDE 5 MG/ML
80 INJECTION, SOLUTION INTRAVENOUS ONCE AS NEEDED
Status: DISCONTINUED | OUTPATIENT
Start: 2024-10-16 | End: 2024-10-20 | Stop reason: HOSPADM

## 2024-10-16 RX ORDER — DIPHENHYDRAMINE HYDROCHLORIDE 50 MG/ML
25 INJECTION INTRAMUSCULAR; INTRAVENOUS EVERY 4 HOURS PRN
Status: ACTIVE | OUTPATIENT
Start: 2024-10-16 | End: 2024-10-19

## 2024-10-16 RX ORDER — OXYTOCIN 10 [USP'U]/ML
10 INJECTION, SOLUTION INTRAMUSCULAR; INTRAVENOUS ONCE AS NEEDED
Status: DISCONTINUED | OUTPATIENT
Start: 2024-10-16 | End: 2024-10-20 | Stop reason: HOSPADM

## 2024-10-16 RX ORDER — ACETAMINOPHEN 10 MG/ML
INJECTION, SOLUTION INTRAVENOUS
Status: DISCONTINUED | OUTPATIENT
Start: 2024-10-16 | End: 2024-10-16

## 2024-10-16 RX ORDER — OXYTOCIN-SODIUM CHLORIDE 0.9% IV SOLN 30 UNIT/500ML 30-0.9/5 UT/ML-%
10 SOLUTION INTRAVENOUS ONCE AS NEEDED
Status: CANCELLED | OUTPATIENT
Start: 2024-10-16 | End: 2036-03-14

## 2024-10-16 RX ORDER — BUPIVACAINE HYDROCHLORIDE 5 MG/ML
24 INJECTION, SOLUTION EPIDURAL; INTRACAUDAL ONCE
Status: DISCONTINUED | OUTPATIENT
Start: 2024-10-16 | End: 2024-10-17

## 2024-10-16 RX ORDER — OXYTOCIN-SODIUM CHLORIDE 0.9% IV SOLN 30 UNIT/500ML 30-0.9/5 UT/ML-%
95 SOLUTION INTRAVENOUS CONTINUOUS PRN
Status: CANCELLED | OUTPATIENT
Start: 2024-10-16

## 2024-10-16 RX ORDER — HYDRALAZINE HYDROCHLORIDE 20 MG/ML
5 INJECTION INTRAMUSCULAR; INTRAVENOUS ONCE AS NEEDED
Status: DISCONTINUED | OUTPATIENT
Start: 2024-10-16 | End: 2024-10-20 | Stop reason: HOSPADM

## 2024-10-16 RX ORDER — ONDANSETRON 4 MG/1
8 TABLET, ORALLY DISINTEGRATING ORAL EVERY 8 HOURS PRN
Status: DISCONTINUED | OUTPATIENT
Start: 2024-10-16 | End: 2024-10-20 | Stop reason: HOSPADM

## 2024-10-16 RX ORDER — METHYLERGONOVINE MALEATE 0.2 MG/ML
200 INJECTION INTRAVENOUS
Status: DISCONTINUED | OUTPATIENT
Start: 2024-10-16 | End: 2024-10-17

## 2024-10-16 RX ORDER — SODIUM CITRATE AND CITRIC ACID MONOHYDRATE 334; 500 MG/5ML; MG/5ML
30 SOLUTION ORAL ONCE
Status: DISCONTINUED | OUTPATIENT
Start: 2024-10-16 | End: 2024-10-17

## 2024-10-16 RX ORDER — HYDROMORPHONE HYDROCHLORIDE 1 MG/ML
1.5 INJECTION, SOLUTION INTRAMUSCULAR; INTRAVENOUS; SUBCUTANEOUS EVERY 4 HOURS PRN
Status: DISCONTINUED | OUTPATIENT
Start: 2024-10-16 | End: 2024-10-20 | Stop reason: HOSPADM

## 2024-10-16 RX ORDER — MORPHINE SULFATE 0.5 MG/ML
INJECTION, SOLUTION EPIDURAL; INTRATHECAL; INTRAVENOUS
Status: DISCONTINUED | OUTPATIENT
Start: 2024-10-16 | End: 2024-10-16

## 2024-10-16 RX ORDER — SODIUM CITRATE AND CITRIC ACID MONOHYDRATE 334; 500 MG/5ML; MG/5ML
30 SOLUTION ORAL ONCE
Status: COMPLETED | OUTPATIENT
Start: 2024-10-16 | End: 2024-10-16

## 2024-10-16 RX ORDER — AMOXICILLIN 250 MG
1 CAPSULE ORAL NIGHTLY PRN
Status: DISCONTINUED | OUTPATIENT
Start: 2024-10-16 | End: 2024-10-20 | Stop reason: HOSPADM

## 2024-10-16 RX ORDER — ONDANSETRON HYDROCHLORIDE 2 MG/ML
4 INJECTION, SOLUTION INTRAVENOUS EVERY 6 HOURS PRN
Status: DISPENSED | OUTPATIENT
Start: 2024-10-16 | End: 2024-10-18

## 2024-10-16 RX ORDER — LABETALOL 100 MG/1
100 TABLET, FILM COATED ORAL EVERY 12 HOURS
Status: DISCONTINUED | OUTPATIENT
Start: 2024-10-16 | End: 2024-10-20 | Stop reason: HOSPADM

## 2024-10-16 RX ORDER — OXYTOCIN-SODIUM CHLORIDE 0.9% IV SOLN 30 UNIT/500ML 30-0.9/5 UT/ML-%
SOLUTION INTRAVENOUS
Status: DISCONTINUED | OUTPATIENT
Start: 2024-10-16 | End: 2024-10-16

## 2024-10-16 RX ORDER — OXYTOCIN-SODIUM CHLORIDE 0.9% IV SOLN 30 UNIT/500ML 30-0.9/5 UT/ML-%
95 SOLUTION INTRAVENOUS ONCE AS NEEDED
Status: DISCONTINUED | OUTPATIENT
Start: 2024-10-16 | End: 2024-10-20 | Stop reason: HOSPADM

## 2024-10-16 RX ORDER — METHYLERGONOVINE MALEATE 0.2 MG/ML
200 INJECTION INTRAVENOUS ONCE AS NEEDED
Status: DISCONTINUED | OUTPATIENT
Start: 2024-10-16 | End: 2024-10-20 | Stop reason: HOSPADM

## 2024-10-16 RX ORDER — OXYCODONE AND ACETAMINOPHEN 10; 325 MG/1; MG/1
1 TABLET ORAL EVERY 4 HOURS PRN
Status: DISCONTINUED | OUTPATIENT
Start: 2024-10-16 | End: 2024-10-20 | Stop reason: HOSPADM

## 2024-10-16 RX ORDER — SIMETHICONE 80 MG
1 TABLET,CHEWABLE ORAL EVERY 6 HOURS PRN
Status: DISCONTINUED | OUTPATIENT
Start: 2024-10-16 | End: 2024-10-20 | Stop reason: HOSPADM

## 2024-10-16 RX ORDER — MAGNESIUM SULFATE HEPTAHYDRATE 40 MG/ML
4 INJECTION, SOLUTION INTRAVENOUS ONCE
Status: DISCONTINUED | OUTPATIENT
Start: 2024-10-16 | End: 2024-10-17

## 2024-10-16 RX ORDER — ACETAMINOPHEN 10 MG/ML
1000 INJECTION, SOLUTION INTRAVENOUS ONCE
Status: DISCONTINUED | OUTPATIENT
Start: 2024-10-16 | End: 2024-10-17

## 2024-10-16 RX ORDER — PHENYLEPHRINE HYDROCHLORIDE 10 MG/ML
INJECTION INTRAVENOUS
Status: DISCONTINUED | OUTPATIENT
Start: 2024-10-16 | End: 2024-10-16

## 2024-10-16 RX ADMIN — DEXTROSE 2 G: 50 INJECTION, SOLUTION INTRAVENOUS at 12:10

## 2024-10-16 RX ADMIN — SODIUM CHLORIDE, SODIUM LACTATE, POTASSIUM CHLORIDE, AND CALCIUM CHLORIDE: .6; .31; .03; .02 INJECTION, SOLUTION INTRAVENOUS at 12:10

## 2024-10-16 RX ADMIN — FENTANYL CITRATE 85 MCG: 50 INJECTION, SOLUTION INTRAMUSCULAR; INTRAVENOUS at 01:10

## 2024-10-16 RX ADMIN — BUTALBITAL, ACETAMINOPHEN AND CAFFEINE 1 TABLET: 325; 50; 40 TABLET ORAL at 01:10

## 2024-10-16 RX ADMIN — PHENYLEPHRINE HYDROCHLORIDE 200 MCG: 10 INJECTION INTRAVENOUS at 12:10

## 2024-10-16 RX ADMIN — MUPIROCIN 1 G: 20 OINTMENT TOPICAL at 10:10

## 2024-10-16 RX ADMIN — LABETALOL HYDROCHLORIDE 100 MG: 100 TABLET, FILM COATED ORAL at 07:10

## 2024-10-16 RX ADMIN — BUPIVACAINE HYDROCHLORIDE 1.4 ML: 7.5 INJECTION, SOLUTION EPIDURAL; RETROBULBAR at 12:10

## 2024-10-16 RX ADMIN — TRAMADOL HYDROCHLORIDE 50 MG: 50 TABLET, COATED ORAL at 03:10

## 2024-10-16 RX ADMIN — CLOSTRIDIUM TETANI TOXOID ANTIGEN (FORMALDEHYDE INACTIVATED), CORYNEBACTERIUM DIPHTHERIAE TOXOID ANTIGEN (FORMALDEHYDE INACTIVATED), BORDETELLA PERTUSSIS TOXOID ANTIGEN (GLUTARALDEHYDE INACTIVATED), BORDETELLA PERTUSSIS FILAMENTOUS HEMAGGLUTININ ANTIGEN (FORMALDEHYDE INACTIVATED), BORDETELLA PERTUSSIS PERTACTIN ANTIGEN, AND BORDETELLA PERTUSSIS FIMBRIAE 2/3 ANTIGEN 0.5 ML: 5; 2; 2.5; 5; 3; 5 INJECTION, SUSPENSION INTRAMUSCULAR at 09:10

## 2024-10-16 RX ADMIN — MORPHINE SULFATE 2.5 MG: 0.5 INJECTION, SOLUTION EPIDURAL; INTRATHECAL; INTRAVENOUS at 01:10

## 2024-10-16 RX ADMIN — Medication 30 UNITS: at 12:10

## 2024-10-16 RX ADMIN — BUTALBITAL, ACETAMINOPHEN AND CAFFEINE 1 TABLET: 325; 50; 40 TABLET ORAL at 07:10

## 2024-10-16 RX ADMIN — ONDANSETRON 4 MG: 2 INJECTION INTRAMUSCULAR; INTRAVENOUS at 12:10

## 2024-10-16 RX ADMIN — ACETAMINOPHEN 1000 MG: 10 INJECTION, SOLUTION INTRAVENOUS at 12:10

## 2024-10-16 RX ADMIN — OXYCODONE HYDROCHLORIDE AND ACETAMINOPHEN 1 TABLET: 10; 325 TABLET ORAL at 09:10

## 2024-10-16 RX ADMIN — ONDANSETRON 4 MG: 2 INJECTION INTRAMUSCULAR; INTRAVENOUS at 08:10

## 2024-10-16 RX ADMIN — SODIUM CITRATE AND CITRIC ACID MONOHYDRATE 30 ML: 500; 334 SOLUTION ORAL at 12:10

## 2024-10-16 RX ADMIN — HYDROMORPHONE HYDROCHLORIDE 1.5 MG: 1 INJECTION, SOLUTION INTRAMUSCULAR; INTRAVENOUS; SUBCUTANEOUS at 04:10

## 2024-10-16 RX ADMIN — OXYCODONE HYDROCHLORIDE 10 MG: 5 TABLET ORAL at 03:10

## 2024-10-16 RX ADMIN — SIMETHICONE 80 MG: 80 TABLET, CHEWABLE ORAL at 09:10

## 2024-10-16 RX ADMIN — DOCUSATE SODIUM 200 MG: 100 CAPSULE, LIQUID FILLED ORAL at 10:10

## 2024-10-16 RX ADMIN — FENTANYL CITRATE 15 MCG: 50 INJECTION, SOLUTION INTRAMUSCULAR; INTRAVENOUS at 12:10

## 2024-10-16 NOTE — TRANSFER OF CARE
"Anesthesia Transfer of Care Note    Patient: Zoran Dubose    Procedure(s) Performed: Procedure(s) (LRB):   SECTION (N/A)    Patient location: Labor and Delivery    Anesthesia Type: CSE    Transport from OR: Transported from OR on room air with adequate spontaneous ventilation    Post pain: adequate analgesia    Post assessment: no apparent anesthetic complications and tolerated procedure well    Post vital signs: stable    Level of consciousness: awake and alert    Nausea/Vomiting: no nausea/vomiting    Complications: none    Transfer of care protocol was followed    Last vitals: Visit Vitals  /66 (Patient Position: Lying)   Pulse 76   Temp 36.4 °C (97.5 °F) (Oral)   Resp 17   Ht 5' 2" (1.575 m)   Wt 108.9 kg (240 lb)   LMP 2023 (Approximate)   SpO2 100%   Breastfeeding No   BMI 43.90 kg/m²     "

## 2024-10-16 NOTE — L&D DELIVERY NOTE
[facility]   Section   Operative Note    SUMMARY     Date of Procedure: 10/16/2024     Procedure: Procedure(s) (LRB):   SECTION (N/A)    Surgeons and Role:     * Marya Spain MD - Primary    Assisting Surgeon:  Preethi Walden MD    Pre-Operative Diagnosis: Preeclampsia, severe, third trimester [O14.13] previous  section    Post-Operative Diagnosis: Post-Op Diagnosis Codes:     * Preeclampsia, severe, third trimester [O14.13] previous  section    Anesthesia: Epidural    Technical Procedures Used:  Repeat low transverse  section           Description of the Findings of the Procedure:   The risks, benefits, indication, and alternatives of the procedure were discussed with the patient and informed consent was obtained.  She was taken to the operating room where spinal anesthesia was found to be adequate. She was prepped and draped in the usual sterile fashion.  A Leigh catheter was inserted. SCD hose were placed and she received antibiotic prophylaxis before any incision was made.      A Pfannenstiel skin incision was made with the knife, and was carried down to the fascia. The fascial incision was excised transversely, and then both   both superiorly and inferiorly off of the muscle. The muscle was  in the midline and the peritoneum was identified and entered bluntly. The peritoneal incision was extended superiorly and inferiorly with good visualization of bladder. The bladder blade was placed and vesicouterine peritoneum was incised in the midline. This incision was extended laterally and the bladder flap was created manually and with sharp dissection.     The uterus had a very large very thin window with minimal tissue.  The patient had had a previous  section less than a year ago.    The uterine incision was started with the knife, and this incision was extended laterally and bluntly also. The membranes were ruptured upon entry and is  clear. The bladder blade was removed and the infant was int the cephalic presentation.  The head was delivered, the nares and mouth were suctioned, then the shoulders cleared easily, followed by the rest of the body without difficulty. It is a viable female infant.  The cord was clamped and cut, and the infant was handed off to  nurse. The uterus was exteriorized and the placenta was completely removed, then the uterus was exteriorized and wiped clean x 2.The uterine incision was repaired with 0 Maxon. In a running locking fashion. The posterior aspect of the uterus was irrigated.  Both tubes and ovaries are inspected and appear normal.    The uterus was returned into the abdomen, and the pelvis was irrigated copiously with warm normal saline.  Small amount of bleeders at the hysterotomy required a few figure-of-eight sutures of 0 and 2-0 chromic for hemostasis.  The uterine incision and lower uterine segment were again checked for hemostasis, and Frank was placed over the area for added hemostasis.  All instruments and lap were removed from the abdomen and pelvis, and counts are correct.     The peritoneum was closed with a 2-0 Vicryl in a running fashion. The rectus muscles are hemostatic, and closed with a U stitch of 2-0 Maxon.  The fascia was closed with 0 Maxon in a running fashion. The subcuticular fat was irrigated with normal saline and hemostasis was achieved using the Bovie. Clovis's fascia was reapproximated using 2-0 Vicryl in a running fashion. The skin was closed using 4-0 Monocryl in a subcuticular fashion and a Mepilex dressing was placed over the incision. The patient tolerated procedure well. Sponge lap needle and instrument counts were correct ×2. She was taken recovery in stable condition. The fernandez continues to drain clear urine.     EBL: 400 ml    Complications: None                 Specimens:   Specimen (24h ago, onward)      None          Apgars 8 and then 5 and then 8 at 10  minutes  Condition: Good    Disposition: PACU - hemodynamically stable.    Attestation: Good         Delivery Information for Girl Zoran Dubose    Birth information:  YOB: 2023   Time of birth: 12:54 PM   Sex: female   Head Delivery Date/Time:     Delivery type:    Gestational Age: 34w5d       Delivery Providers    Delivering clinician:            Measurements    Weight:   Length:          Apgars    Living status:   Apgar Component Scores:  1 min.:  5 min.:  10 min.:  15 min.:  20 min.:    Skin color:         Heart rate:         Reflex irritability:         Muscle tone:         Respiratory effort:         Total:                                  Interventions/Resuscitation           Cord    No data filed       Placenta    Placenta delivery date/time:   Placenta removal:            Labor Events:       labor:       Labor Onset Date/Time:         Dilation Complete Date/Time:         Start Pushing Date/Time:         Start Pushing Date/Time:       Rupture Date/Time:            Rupture type:          Fluid Amount:       Fluid Color:                steroids:       Antibiotics given for GBS:       Induction:       Indications for induction:        Augmentation:       Indications for augmentation:       Labor complications:       Additional complications:          Cervical ripening:                     Delivery:      Episiotomy:       Indication for Episiotomy:       Perineal Lacerations:   Repaired:      Periurethral Laceration:   Repaired:     Labial Laceration:   Repaired:     Sulcus Laceration:   Repaired:     Vaginal Laceration:   Repaired:     Cervical Laceration:   Repaired:     Repair suture:       Repair # of packets:       Last Value - EBL - Nursing (mL):       Sum - EBL - Nursing (mL): 0     Last Value - EBL - Anesthesia (mL):      Calculated QBL (mL):       Running total QBL (mL):       Vaginal Sweep Performed:       Surgicount Correct:         Other providers:             Details (if applicable):  Trial of Labor      Categorization:      Priority:     Indications for :     Incision Type:       Additional  information:  Forceps:    Vacuum:    Breech:    Observed anomalies    Other (Comments):

## 2024-10-16 NOTE — LACTATION NOTE
10/16/24 1530   Maternal Assessment   Breast Density Bilateral:;soft   Areola Bilateral:;elastic   Nipples Bilateral:;everted   Equipment Type   Breast Pump Type double electric, hospital grade   Breast Pump Flange Type hard   Breast Pump Flange Size 24 mm   Breast Pumping   Breast Pumping Interventions early pumping promoted;frequent pumping encouraged   Breast Pumping double electric breast pump utilized     Assisted to initiate pumping for baby in NICU at this time. Reviewed NICU pumping instructions and emphasized importance of pumping at least eight times in 24 hours to stimulate adequate milk production. Encouraged to request assistance from nursing staff with pumping during the night. Patient verbalizes understanding of all instructions with good recall.    [place] pump, tubing, collections containers and labels brought to bedside.  Discussed proper pump setting of initiation phase.  Instructed on proper usage of pump and to adjust suction according to maximum comfort level.  Verified appropriate flange fit.  Educated on the frequency and duration of pumping in order to promote and maintain a full milk supply.  Hands on pumping technique reviewed.  Encouraged hand expression after pumping.  Instructed on cleaning of breast pump parts.  Written instructions also given.  Pt verbalized understanding and appropriate recall for proper milk handling, collection, labeling, storage and transportation.

## 2024-10-16 NOTE — NURSING
2055- Patient returned from MRI. Results in process. Patient requests to stay throughout night and d/c tomorrow 10/16. Communicated with MD Grabiel on patient's POC. New orders in place.     0245- Communicated with MD Kj on patient's current headache pain regimen not being as effective. Instructed to allow prior dose time to kick in fully. No new orders.

## 2024-10-16 NOTE — ANESTHESIA PREPROCEDURE EVALUATION
10/16/2024  Zoran Dubose is a 24 y.o., female.      Pre-op Assessment    I have reviewed the Patient Summary Reports.     I have reviewed the Nursing Notes. I have reviewed the NPO Status.   I have reviewed the Medications.     Review of Systems  Anesthesia Hx:     Labor epidural was not adequate for emergency  and so she had to be put to sleep.           Denies Family Hx of Anesthesia complications.    Denies Personal Hx of Anesthesia complications.                    Social:  Non-Smoker, No Alcohol Use       Hematology/Oncology:  Hematology Normal   Oncology Normal                                   EENT/Dental:   Bilateral broken lower molars          Cardiovascular:     Hypertension (Questionable chronic hypertension)               Severe preeclampsia with severe headache and vision changes yesterday.  Blood pressure currently under control with oral labetalol.  Headache still present but much better and no vision changes today.                           Pulmonary:  Pulmonary Normal                       Renal/:  Renal/ Normal                 Hepatic/GI:        Patient had nausea this morning and then ate breakfast about 8:00 a.m..  She then vomited and now feels better.             Musculoskeletal:     Occasional low back pain with pregnancy            Neurological:  Neurology Normal                                      Endocrine:  Endocrine Normal            Psych:  Psychiatric Normal                    Physical Exam  General: Well nourished, Cooperative, Alert and Oriented    Airway:  Mallampati: III / II  Mouth Opening: Normal  TM Distance: > 6 cm  Tongue: Normal  Neck ROM: Normal ROM    Dental:  Intact    Chest/Lungs:  Clear to auscultation, Normal Respiratory Rate    Heart:  Rate: Normal  Rhythm: Regular Rhythm  Sounds: Normal        Anesthesia Plan  Type of Anesthesia, risks & benefits  discussed:    Anesthesia Type: CSE  Intra-op Monitoring Plan: Standard ASA Monitors  Post Op Pain Control Plan: multimodal analgesia  Informed Consent: Informed consent signed with the Patient and all parties understand the risks and agree with anesthesia plan.  All questions answered.   ASA Score: 3 Emergent  Anesthesia Plan Notes: Epidural morphine, IV acetaminophen, Zofran, Bicitra    Ready For Surgery From Anesthesia Perspective.     .

## 2024-10-16 NOTE — ASSESSMENT & PLAN NOTE
Intrauterine pregnancy at 3 4 weeks 4 days with gestational hypertension and headache   Proteinuria is 324 mg of protein   Patient has a history of uncontrolled elevated severe blood pressures at 39 weeks gestation previous pregnancy which was a year ago.  Blood pressure is currently look good patient continues to have headache.  I am going to order an MRI of the brain.  Patient has anemia and she has trouble getting to appointments some going to go ahead and order an iron infusion today here as I do not suspect she will be able to get this done outpatient   CBC and CMP are normal as far as liver functions and platelets.  Patient will receive Fioricet this morning for headache and see if that helps better than the hydroxyzine and tramadol that was previously ordered.    Proceeding to repeat  section secondary to elevated blood pressures into the severe range and unrelenting headache.  Patient did receive an IV iron infusion yesterday.  Known anemia.  We will plan magnesium prophylaxis postpartum

## 2024-10-16 NOTE — NURSING
[facility]  Department of OBGYN  OB Summary        PATIENT NAME: Zoran Dubose                                                                                                                                                                       AGE: 24 y.o.                                                                                          MRN: [id number]  PATIENT LOCATION:  [facility]/2302-A  ADMIT DATE: 10/14/2024  TODAY'S DATE: 10/16/2024 Hospital Day: 3  GALILEA: Estimated Date of Delivery: 24  GA: 34w5d     OB HISTORY:    OB History    Para Term  AB Living   2 2 1 1   2   SAB IAB Ectopic Multiple Live Births         0 2      # Outcome Date GA Lbr Zack/2nd Weight Sex Type Anes PTL Lv   2  10/16/24 34w5d  2.78 kg (6 lb 2.1 oz) F CS-LTranv Spinal, EPI  HARI   1 Term 23 39w2d  3.55 kg (7 lb 13.2 oz) M CS-LTranv Gen, EPI N HARI       PRE-PROCEDURE DIAGNOSIS: Gestational hypertension, third trimester    PROCEDURE:   Procedure(s) (LRB):   SECTION (N/A)       MATERNAL LABS   Lab Results   Component Value Date    GROUPTRH A POS 10/16/2024    HGB 8.2 (L) 10/16/2024    HCT 27.6 (L) 10/16/2024     (H) 10/16/2024    RUBELLAIMMUN immune 2023    [id number] negative 2023    RPR Non-reactive 2023    STREPBCULT unknown 2023       Fentanyl, Urine   Date Value Ref Range Status   10/11/2024 Negative @MAIKEL Negative Final     Comment:     The cut-off value is 5.0 ng/mL. This is a screening test. If results   do not   correlate with clinical presentation then a confirmatory send out   test is   advised. This result is intended for use in clinical management. It   is not   intended for use in employment related testing.         Benzodiazepines   Date Value Ref Range Status   10/11/2024 Negative Negative Final     Cocaine (Metab.)   Date Value Ref Range Status   10/11/2024 Negative Negative Final     Opiate Scrn, Ur   Date Value Ref Range Status    10/11/2024 Negative Negative Final     Barbiturate Screen, Ur   Date Value Ref Range Status   10/11/2024 Negative Negative Final     Amphetamine Screen, Ur   Date Value Ref Range Status   10/11/2024 Negative Negative Final     THC   Date Value Ref Range Status   10/11/2024 Negative Negative Final     Phencyclidine   Date Value Ref Range Status   10/11/2024 Negative Negative Final     BUPRENORPHINE   Date Value Ref Range Status   10/11/2024 Negative  Final     Comment:     Buprenorphine urine screening threshold: 5 ng/mL.    This report is intended for use in clinical monitoring and management   of   patients only.          SPECIMENS  Specimen (24h ago, onward)      None             Antibiotics (From admission, onward)      Start     Stop Route Frequency Ordered    10/16/24 2100  mupirocin 2 % ointment         10/21/24 2059 Nasl 2 times daily 10/16/24 1336    10/16/24 1050  cefazolin (ANCEF) 2 gram in dextrose 5% 50 mL IVPB (premix)  (Scheduled  Section Antibiotics)         -- IV On Call Procedure 10/16/24 1057            INPATIENT MEDICATIONS  Current Facility-Administered Medications   Medication Dose Route Frequency Provider Last Rate Last Admin    acetaminophen 1,000 mg/100 mL (10 mg/mL) injection 1,000 mg  1,000 mg Intravenous Once Marya Spain MD        BUPivacaine (PF) 0.5% (5 mg/mL) injection 120 mg  24 mL Infiltration Once Marya Spain MD        BUPivacaine liposome (PF) 1.3 % (13.3 mg/mL) suspension 266 mg  20 mL Infiltration Once Marya Spain MD        butalbital-acetaminophen-caffeine -40 mg per tablet 1 tablet  1 tablet Oral Q8H PRN Marya Spain MD   1 tablet at 10/16/24 0748    calcium carbonate 200 mg calcium (500 mg) chewable tablet 500 mg  500 mg Oral BID PRN Carlos Underwood MD   500 mg at 10/15/24 2113    calcium gluconate 100 mg/mL (10%) injection 1 g  1 g Intravenous PRN Marya Spain MD        carboprost injection 250 mcg  250 mcg Intramuscular Q15 Min PRN Grabiel  MD Marya        cefazolin (ANCEF) 2 gram in dextrose 5% 50 mL IVPB (premix)  2 g Intravenous On Call Procedure Marya Spain MD        diphenhydrAMINE injection 25 mg  25 mg Intravenous Q4H PRN Saravanan Boston MD        diphenoxylate-atropine 2.5-0.025 mg per tablet 2 tablet  2 tablet Oral Q6H PRN Marya Spain MD        docusate sodium capsule 200 mg  200 mg Oral BID Marya Spain MD        hydrALAZINE injection 10 mg  10 mg Intravenous Once PRN Marya Spain MD        hydrALAZINE injection 5 mg  5 mg Intravenous Once PRN Marya Spain MD        HYDROmorphone injection 1.5 mg  1.5 mg Intravenous Q4H PRN Saravanan Boston MD   1.5 mg at 10/16/24 1656    ibuprofen tablet 600 mg  600 mg Oral Q6H Marya Spain MD        labetaloL injection 20 mg  20 mg Intravenous Once PRN Marya Spain MD        labetaloL injection 40 mg  40 mg Intravenous Once PRN Marya Spain MD        labetaloL injection 40 mg  40 mg Intravenous Once PRN Marya Spain MD        labetaloL injection 80 mg  80 mg Intravenous Once PRN Marya Spain MD        labetaloL tablet 100 mg  100 mg Oral Q12H Marya Spain MD        lactated ringers bolus 1,000 mL  1,000 mL Intravenous PRN Marya Spain MD        lactated ringers infusion   Intravenous Continuous Marya Spain MD        magnesium sulfate in water 40 gram/1,000 mL (4 %) bolus from bag 4 g 100 mL  4 g Intravenous Once Marya Spain MD        And    lactated ringers infusion   Intravenous Continuous Marya Spain MD        lanolin cream   Topical (Top) PRN Marya Spain MD        magnesium sulfate in water 40 gram/1,000 mL (4 %) infusion  2 g/hr Intravenous Continuous Marya Spain MD        measles, mumps and rubella vaccine 1,000-12,500 TCID50/0.5 mL injection 0.5 mL  0.5 mL Subcutaneous vaccine x 1 dose Marya Spain MD        methylergonovine injection 200 mcg  200 mcg Intramuscular On Call Procedure Marya Spain MD        methylergonovine injection 200  mcg  200 mcg Intramuscular Once PRN Marya Spain MD        miSOPROStoL tablet 800 mcg  800 mcg Oral Once PRN Marya Spain MD        mupirocin 2 % ointment   Nasal BID Marya Spain MD        nalbuphine injection 5 mg  5 mg Intravenous Q4H PRN Saravanan Boston MD        ondansetron disintegrating tablet 8 mg  8 mg Oral Q8H PRN Marya Spain MD        ondansetron injection 4 mg  4 mg Intravenous Q6H PRN Saravanan Boston MD        oxyCODONE immediate release tablet 10 mg  10 mg Oral Q4H PRN Saravanan Boston MD   10 mg at 10/16/24 1542    oxyCODONE-acetaminophen  mg per tablet 1 tablet  1 tablet Oral Q4H PRN Marya Spain MD        [START ON 10/17/2024] oxyCODONE-acetaminophen 5-325 mg per tablet 1 tablet  1 tablet Oral Q4H PRN Marya Spain MD        oxytocin 30 units/500 mL (60 milliunits/mL) in 0.9% NaCl (non-titrating)  95 merrick-units/min Intravenous Continuous PRN Marya Spain MD        oxytocin 30 units/500 mL (60 milliunits/mL) in 0.9% NaCl (non-titrating)  95 merrick-units/min Intravenous Once PRN Marya Spain MD        oxytocin 30 units/500 mL (60 milliunits/mL) in 0.9% NaCl IV bolus from bag  30 Units Intravenous Once Marya Spain MD        oxytocin injection 10 Units  10 Units Intramuscular Once PRN aMrya Spain MD        prochlorperazine injection Soln 5 mg  5 mg Intravenous Q6H PRN Saravanan Boston MD        senna-docusate 8.6-50 mg per tablet 1 tablet  1 tablet Oral Nightly PRN Marya Spain MD        simethicone chewable tablet 80 mg  1 tablet Oral Q6H PRN Marya Spain MD        sodium citrate-citric acid 500-334 mg/5 ml solution 30 mL  30 mL Oral Once Saravanan Boston MD        Tdap vaccine injection 0.5 mL  0.5 mL Intramuscular vaccine x 1 dose Marya Spain MD        traMADoL tablet 50 mg  50 mg Oral Q6H PRN Neville Palmer MD   50 mg at 10/16/24 0329    tranexamic acid in NaCl,iso-os IVPB 1,000 mg  1,000 mg Intravenous Q30 Min PRN Marya Spain MD      "      OUTPATIENT MEDICATIONS  Current Outpatient Medications   Medication Instructions    acetaminophen (TYLENOL) 650 mg, Oral, Every 8 hours    aspirin 81 mg, Daily    dexchlorphen-phenylephrine-DM (POLYTUSSIN DM) 1-5-10 mg/5 mL Syrp 10 mLs, Oral, Every 4 hours PRN    fluticasone propionate (FLONASE) 50 mcg, Each Nostril, Daily    hydrALAZINE (APRESOLINE) 50 mg, Oral, Every 8 hours    hydrALAZINE (APRESOLINE) 50 mg, Oral, 3 times daily    ibuprofen (ADVIL,MOTRIN) 600 mg, Oral, Every 6 hours PRN    NIFEdipine (PROCARDIA XL) 60 mg, Oral, Daily    NIFEdipine (PROCARDIA-XL) 30 mg, Oral, Daily    ondansetron (ZOFRAN) 4 mg, Oral, Every 6 hours    oxyCODONE-acetaminophen (PERCOCET) 5-325 mg per tablet 1 tablet, Oral, Every 4 hours PRN    prenatal 25/iron/folate 6/dha (PRENA1 ORAL) Take by mouth.    prenatal vit/iron fum/folic ac (PRENATAL 1+1 ORAL) 1 tablet, Oral, Daily    promethazine (PHENERGAN) 25 mg, Oral, Every 6 hours PRN       VITAL SIGNS (Most Recent)  Temp: 98.1 °F (36.7 °C) (10/16/24 1349)  Pulse: 72 (10/16/24 1820)  Resp: 17 (10/16/24 1820)  BP: (!) 140/83 (10/16/24 1820)  SpO2: 100 % (10/16/24 0700)  Temp (36hrs), Av °F (36.7 °C), Min:97.5 °F (36.4 °C), Max:98.4 °F (36.9 °C)      Delivery Information for Girl Zoran Dubose    Birth information:  YOB: 2023   Time of birth: 12:54 PM   Sex: female   Head Delivery Date/Time: 10/16/2024 12:54 PM   Delivery type: , Low Transverse   Gestational Age: 34w5d       Delivery Providers    Delivering clinician: Marya Spain MD   Provider Role    Preethi Walden MD Assisting Surgeon    Aislinn Harding, JOSE Nurse Anesthetist    Saravanan Boston MD Anesthesiologist    Eleni Bush RN Registered Nurse    Elly Alba, NNP Nurse Practitioner    Nataliya Ba RN Registered Nurse    Aydee Ayala Surgical Tech              Measurements    Weight: 2780 g  Weight (lbs): 6 lb 2.1 oz  Length: 46.5 cm  Length (in): 18.31"   "       Apgars    Living status: Living  Apgar Component Scores:  1 min.:  5 min.:  10 min.:  15 min.:  20 min.:    Skin color:  0  0  1      Heart rate:  2  2  2      Reflex irritability:  2  2  2      Muscle tone:  2  1  1      Respiratory effort:  2  0  2      Total:  8  5  8      Apgars assigned by: RUDOLPH ESCOBAR         Operative Delivery    Forceps attempted?: No  Vacuum extractor attempted?: No         Shoulder Dystocia    Shoulder dystocia present?: No           Presentation    Presentation: Vertex  Position: Middle Occiput Anterior           Interventions/Resuscitation    Method: Bulb Suctioning, Tactile Stimulation, Deep Suctioning, CPAP, PPV       Cord    Vessels: 3 vessels  Complications: None  Delayed Cord Clamping?: Yes  Cord Clamped Date/Time: 10/16/2024 12:56 PM  Cord Blood Disposition: Sent with Baby  Gases Sent?: No  Stem Cell Collection (by MD): No       Placenta    Placenta delivery date/time: 10/16/2024 1256  Placenta removal: Manual removal  Placenta appearance: Intact  Placenta disposition: Discarded           Labor Events:       labor:       Labor Onset Date/Time:         Dilation Complete Date/Time:         Start Pushing Date/Time:       Rupture Date/Time:            Rupture type:          Fluid Amount:       Fluid Color:        steroids: Full Course     Antibiotics given for GBS: No     Induction:       Indications for induction:        Augmentation:       Indications for augmentation:       Labor complications: None     Additional complications:          Cervical ripening:                     Delivery:      Episiotomy:       Indication for Episiotomy:       Perineal Lacerations:   Repaired:      Periurethral Laceration:   Repaired:     Labial Laceration:   Repaired:     Sulcus Laceration:   Repaired:     Vaginal Laceration:   Repaired:     Cervical Laceration:   Repaired:     Repair suture:       Repair # of packets:       Last Value - EBL - Nursing (mL):       Sum -  EBL - Nursing (mL): 0     Last Value - EBL - Anesthesia (mL):      Calculated QBL (mL): 532     Running total QBL (mL): 532     Vaginal Sweep Performed:       Surgicount Correct:         Other providers:       Anesthesia    Method: Spinal, Epidural          Details (if applicable):  Trial of Labor No    Categorization: Repeat    Priority: Urgent   Indications for : Repeat Section   Incision Type: low transverse     Additional  information:  Forceps:    Vacuum:    Breech:    Observed anomalies    Other (Comments):           Time ruptured: rupture date, rupture time, delivery date, or delivery time have not been documented    SKIN TO SKIN                INFANT FEEDING       PAST MEDICAL HISTORY   Past Medical History:   Diagnosis Date    Hypertension         PAST SURGICAL HISTORY    Past Surgical History:   Procedure Laterality Date     SECTION N/A 2023    Procedure:  SECTION;  Surgeon: Marya Spain MD;  [facility]: [facility];  Service: OB/GYN;  Laterality: N/A;        SOCIAL HISTORY    Social History     Tobacco Use    Smoking status: Never   Substance Use Topics    Alcohol use: No    Drug use: Not Currently                     Nataliya Ba RN  Date of Service: 10/16/2024  1400 PM

## 2024-10-16 NOTE — SUBJECTIVE & OBJECTIVE
Obstetric HPI:  Patient continued to have headache yesterday unrelieved by tramadol and Fioricet.  Then she started having nausea last night and vomiting this morning.      Objective:     Vital Signs (Most Recent):  Temp: 97.5 °F (36.4 °C) (10/16/24 0700)  Pulse: 76 (10/16/24 0748)  Resp: 17 (10/16/24 0700)  BP: 121/66 (10/16/24 1130)  SpO2: 100 % (10/16/24 0700) Vital Signs (24h Range):  Temp:  [97.5 °F (36.4 °C)-98.4 °F (36.9 °C)] 97.5 °F (36.4 °C)  Pulse:  [] 76  Resp:  [17-18] 17  SpO2:  [97 %-100 %] 100 %  BP: (109-178)/() 121/66     Weight: 108.9 kg (240 lb)  Body mass index is 43.9 kg/m².    FHT:  140s with fair variability Cat 2 (reassuring)  TOCO:  No contractions noted this was just a short strip done before going back to the operating room    No intake or output data in the 24 hours ending 10/16/24 1219         Significant Labs:  Recent Lab Results         10/16/24  1121   10/16/24  0852        Albumin   3.3       ALP   107       ALT   10       Anion Gap   8       AST   15       Baso #   0.02       Basophil %   0.1       BILIRUBIN TOTAL   0.2  Comment: For infants and newborns, interpretation of results should be based  on gestational age, weight and in agreement with clinical  observations.    Premature Infant recommended reference ranges:  Up to 24 hours.............<8.0 mg/dL  Up to 48 hours............<12.0 mg/dL  3-5 days..................<15.0 mg/dL  6-29 days.................<15.0 mg/dL         BUN   5       Calcium   8.8       Chloride   106       CO2   21       Creatinine   0.6       Differential Method   Automated       eGFR   >60.0       Eos #   0.1       Eos %   0.4       Glucose   117       Gran # (ANC)   11.6       Gran %   70.5       Group & Rh A POS         Hematocrit   27.6       Hemoglobin   8.2       Immature Grans (Abs)   0.34  Comment: Mild elevation in immature granulocytes is non specific and   can be seen in a variety of conditions including stress response,   acute  inflammation, trauma and pregnancy. Correlation with other   laboratory and clinical findings is essential.         Immature Granulocytes   2.1       INDIRECT ANTHONY NEG         Lymph #   3.2       Lymph %   19.7       MCH   20.9       MCHC   29.7       MCV   70       Mono #   1.2       Mono %   7.2       MPV   10.4       nRBC   1       Platelet Count   460       Potassium   3.6       PROTEIN TOTAL   6.7       RBC   3.92       RDW   16.3       Sodium   135       WBC   16.46               Physical Exam  Patient continues to have headache   Blood pressures started to get elevated again overnight.  Most recent 170/110 on postpartum.  Ranging from 140s to 160s over .  Extremities no significant edema    Review of Systems

## 2024-10-16 NOTE — PLAN OF CARE
Problem: Adult Inpatient Plan of Care  Goal: Plan of Care Review  Outcome: Progressing  Goal: Patient-Specific Goal (Individualized)  Outcome: Progressing  Goal: Absence of Hospital-Acquired Illness or Injury  Outcome: Progressing  Goal: Optimal Comfort and Wellbeing  Outcome: Progressing  Goal: Readiness for Transition of Care  Outcome: Progressing     Problem: Bariatric Environmental Safety  Goal: Safety Maintained with Care  Outcome: Progressing     Problem:  Fall Injury Risk  Goal: Absence of Fall, Infant Drop and Related Injury  Outcome: Progressing     Problem: Infection  Goal: Absence of Infection Signs and Symptoms  Outcome: Progressing     Problem: Postpartum ( Delivery)  Goal: Successful Parent Role Transition  Outcome: Progressing  Goal: Hemostasis  Outcome: Progressing  Goal: Effective Bowel Elimination  Outcome: Progressing  Goal: Fluid and Electrolyte Balance  Outcome: Progressing  Goal: Absence of Infection Signs and Symptoms  Outcome: Progressing  Goal: Anesthesia/Sedation Recovery  Outcome: Progressing  Goal: Optimal Pain Control and Function  Outcome: Progressing  Goal: Nausea and Vomiting Relief  Outcome: Progressing  Goal: Effective Urinary Elimination  Outcome: Progressing  Goal: Effective Oxygenation and Ventilation  Outcome: Progressing     Problem: Wound  Goal: Optimal Coping  Outcome: Progressing  Goal: Optimal Functional Ability  Outcome: Progressing  Goal: Absence of Infection Signs and Symptoms  Outcome: Progressing  Goal: Improved Oral Intake  Outcome: Progressing  Goal: Optimal Pain Control and Function  Outcome: Progressing  Goal: Skin Health and Integrity  Outcome: Progressing  Goal: Optimal Wound Healing  Outcome: Progressing     Problem: Breastfeeding  Goal: Effective Breastfeeding  Outcome: Progressing

## 2024-10-16 NOTE — ANESTHESIA PROCEDURE NOTES
CSE    Patient location during procedure: OR  Start time: 10/16/2024 12:28 PM  Timeout: 10/16/2024 12:28 PM  End time: 10/16/2024 12:35 PM    Reason for block: at surgeon's request and post-op pain management    Staffing  Authorizing Provider: Saravanan Boston MD  Performing Provider: Saravanan Boston MD    Staffing  Performed by: Saravanan Boston MD  Authorized by: Saravanan Boston MD    Preanesthetic Checklist  Completed: patient identified, IV checked, site marked, risks and benefits discussed, surgical consent, monitors and equipment checked, pre-op evaluation and timeout performed  CSE  Patient position: sitting  Prep: Betadine  Patient monitoring: frequent blood pressure checks and cardiac monitor  Approach: midline  Spinal Needle  Needle type: pencil-tip   Needle gauge: 25 G  Needle length: 5 in  Epidural Needle  Injection technique: BRIA air  Needle type: Tuohy   Needle gauge: 17 G  Needle length: 3.5 in  Needle insertion depth: 9 cm  Location: L3-4  Needle localization: anatomical landmarks   Catheter  Catheter type: Invesdor  Catheter size: 19 G  Catheter at skin depth: 13 cm  Test dose: lidocaine 1.5% with Epi 1-to-200,000 and negative  Test dose: 3 mL  Additional Documentation: negative aspiration for CSF, negative aspiration for heme, no paresthesia on injection and negative test dose  Assessment  Sensory level: T4   Dermatomal levels determined by alcohol swab  Intrathecal Medications:   administered: primary anesthetic mcg of    Additional Notes  No paresthesia with epidural needle insertion.  Momentary left leg paresthesia with spinal needle insertion.  Clear CSF per spinal needle.  Spinal bupivacaine injected easily.  Epidural catheter inserted easily.  No paresthesia with epidural catheter insertion.  Patient tolerated procedure well.  Medications:    Medications: Intraspinal - bupivacaine (pf) (MARCAINE) injection 0.75% - Intraspinal   1.4 mL - 10/16/2024 12:33:00 PM

## 2024-10-16 NOTE — PROGRESS NOTES
[facility]  Obstetrics  Antepartum Progress Note    Patient Name: Zoran Dubose  MRN: [id number]  Admission Date: 10/14/2024  Hospital Length of Stay: 1 days  Attending Physician: Marya Spain MD  Primary Care Provider: June Zaragoza MD    Subjective:     Principal Problem:Gestational hypertension, third trimester    HPI:  No notes on file    Hospital Course:  No notes on file    Obstetric HPI:  Patient continued to have headache yesterday unrelieved by tramadol and Fioricet.  Then she started having nausea last night and vomiting this morning.      Objective:     Vital Signs (Most Recent):  Temp: 97.5 °F (36.4 °C) (10/16/24 0700)  Pulse: 76 (10/16/24 0748)  Resp: 17 (10/16/24 0700)  BP: 121/66 (10/16/24 1130)  SpO2: 100 % (10/16/24 0700) Vital Signs (24h Range):  Temp:  [97.5 °F (36.4 °C)-98.4 °F (36.9 °C)] 97.5 °F (36.4 °C)  Pulse:  [] 76  Resp:  [17-18] 17  SpO2:  [97 %-100 %] 100 %  BP: (109-178)/() 121/66     Weight: 108.9 kg (240 lb)  Body mass index is 43.9 kg/m².    FHT:  140s with fair variability Cat 2 (reassuring)  TOCO:  No contractions noted this was just a short strip done before going back to the operating room    No intake or output data in the 24 hours ending 10/16/24 1219         Significant Labs:  Recent Lab Results         10/16/24  1121   10/16/24  0852        Albumin   3.3       ALP   107       ALT   10       Anion Gap   8       AST   15       Baso #   0.02       Basophil %   0.1       BILIRUBIN TOTAL   0.2  Comment: For infants and newborns, interpretation of results should be based  on gestational age, weight and in agreement with clinical  observations.    Premature Infant recommended reference ranges:  Up to 24 hours.............<8.0 mg/dL  Up to 48 hours............<12.0 mg/dL  3-5 days..................<15.0 mg/dL  6-29 days.................<15.0 mg/dL         BUN   5       Calcium   8.8       Chloride   106       CO2   21       Creatinine   0.6        Differential Method   Automated       eGFR   >60.0       Eos #   0.1       Eos %   0.4       Glucose   117       Gran # (ANC)   11.6       Gran %   70.5       Group & Rh A POS         Hematocrit   27.6       Hemoglobin   8.2       Immature Grans (Abs)   0.34  Comment: Mild elevation in immature granulocytes is non specific and   can be seen in a variety of conditions including stress response,   acute inflammation, trauma and pregnancy. Correlation with other   laboratory and clinical findings is essential.         Immature Granulocytes   2.1       INDIRECT ANTHONY NEG         Lymph #   3.2       Lymph %   19.7       MCH   20.9       MCHC   29.7       MCV   70       Mono #   1.2       Mono %   7.2       MPV   10.4       nRBC   1       Platelet Count   460       Potassium   3.6       PROTEIN TOTAL   6.7       RBC   3.92       RDW   16.3       Sodium   135       WBC   16.46               Physical Exam  Patient continues to have headache   Blood pressures started to get elevated again overnight.  Most recent 170/110 on postpartum.  Ranging from 140s to 160s over .  Extremities no significant edema    Review of Systems  Assessment/Plan:     24 y.o. female  at 34w5d for:    * Gestational hypertension, third trimester  Intrauterine pregnancy at 3 4 weeks 4 days with gestational hypertension and headache   Proteinuria is 324 mg of protein   Patient has a history of uncontrolled elevated severe blood pressures at 39 weeks gestation previous pregnancy which was a year ago.  Blood pressure is currently look good patient continues to have headache.  I am going to order an MRI of the brain.  Patient has anemia and she has trouble getting to appointments some going to go ahead and order an iron infusion today here as I do not suspect she will be able to get this done outpatient   CBC and CMP are normal as far as liver functions and platelets.  Patient will receive Fioricet this morning for headache and see if that  helps better than the hydroxyzine and tramadol that was previously ordered.    Proceeding to repeat  section secondary to elevated blood pressures into the severe range and unrelenting headache.  Patient did receive an IV iron infusion yesterday.  Known anemia.  We will plan magnesium prophylaxis postpartum          Marya Spain MD  Obstetrics  [facility]

## 2024-10-17 PROCEDURE — 25000003 PHARM REV CODE 250: Performed by: SPECIALIST

## 2024-10-17 PROCEDURE — 12000002 HC ACUTE/MED SURGE SEMI-PRIVATE ROOM

## 2024-10-17 PROCEDURE — 63600175 PHARM REV CODE 636 W HCPCS: Performed by: ANESTHESIOLOGY

## 2024-10-17 PROCEDURE — 25000003 PHARM REV CODE 250: Performed by: ANESTHESIOLOGY

## 2024-10-17 PROCEDURE — 25000242 PHARM REV CODE 250 ALT 637 W/ HCPCS: Performed by: SPECIALIST

## 2024-10-17 RX ADMIN — LABETALOL HYDROCHLORIDE 100 MG: 100 TABLET, FILM COATED ORAL at 08:10

## 2024-10-17 RX ADMIN — IBUPROFEN 600 MG: 200 TABLET, FILM COATED ORAL at 06:10

## 2024-10-17 RX ADMIN — Medication: at 04:10

## 2024-10-17 RX ADMIN — IBUPROFEN 600 MG: 200 TABLET, FILM COATED ORAL at 12:10

## 2024-10-17 RX ADMIN — DOCUSATE SODIUM 200 MG: 100 CAPSULE, LIQUID FILLED ORAL at 10:10

## 2024-10-17 RX ADMIN — TRAMADOL HYDROCHLORIDE 50 MG: 50 TABLET, COATED ORAL at 08:10

## 2024-10-17 RX ADMIN — ONDANSETRON 4 MG: 2 INJECTION INTRAMUSCULAR; INTRAVENOUS at 06:10

## 2024-10-17 RX ADMIN — OXYCODONE HYDROCHLORIDE AND ACETAMINOPHEN 1 TABLET: 10; 325 TABLET ORAL at 07:10

## 2024-10-17 RX ADMIN — LABETALOL HYDROCHLORIDE 100 MG: 100 TABLET, FILM COATED ORAL at 10:10

## 2024-10-17 RX ADMIN — IBUPROFEN 600 MG: 200 TABLET, FILM COATED ORAL at 11:10

## 2024-10-17 RX ADMIN — HYDROMORPHONE HYDROCHLORIDE 1 MG: 1 INJECTION, SOLUTION INTRAMUSCULAR; INTRAVENOUS; SUBCUTANEOUS at 06:10

## 2024-10-17 RX ADMIN — DOCUSATE SODIUM 200 MG: 100 CAPSULE, LIQUID FILLED ORAL at 08:10

## 2024-10-17 RX ADMIN — OXYCODONE HYDROCHLORIDE 5 MG: 5 TABLET ORAL at 04:10

## 2024-10-17 NOTE — ASSESSMENT & PLAN NOTE
Intrauterine pregnancy at 3 4 weeks 4 days with gestational hypertension and headache   Proteinuria is 324 mg of protein   Patient has a history of uncontrolled elevated severe blood pressures at 39 weeks gestation previous pregnancy which was a year ago.  Blood pressure is currently look good patient continues to have headache.  I am going to order an MRI of the brain.  Patient has anemia and she has trouble getting to appointments some going to go ahead and order an iron infusion today here as I do not suspect she will be able to get this done outpatient   CBC and CMP are normal as far as liver functions and platelets.  Patient will receive Fioricet this morning for headache and see if that helps better than the hydroxyzine and tramadol that was previously ordered.    Proceeding to repeat  section secondary to elevated blood pressures into the severe range and unrelenting headache.  Patient did receive an IV iron infusion yesterday.  Known anemia.  We will plan magnesium prophylaxis postpartum    Postoperative day 1 repeat  section   Blood pressures improved on 100 of labetalol twice a day   Continue postoperative care  Rh positive

## 2024-10-17 NOTE — ANESTHESIA POSTPROCEDURE EVALUATION
Anesthesia Post Evaluation    Patient: Zoran Dubose    Procedure(s) Performed: Procedure(s) (LRB):   SECTION (N/A)    Final Anesthesia Type: CSE      Patient location during evaluation: floor  Patient participation: Yes- Able to Participate  Level of consciousness: awake and alert  Post-procedure vital signs: reviewed and stable  Pain management: adequate  Airway patency: patent    PONV status at discharge: No PONV  Anesthetic complications: no      Cardiovascular status: stable  Respiratory status: unassisted  Hydration status: euvolemic  Follow-up not needed.  Comments: Both lower extremities back to normal from neuraxial anesthesia.  No headache or back pain.  No nausea or severe itching currently.  Adequate analgesia.  No anesthesia complications.                  Vitals Value Taken Time   /86 10/17/24 1025   Temp 36.9 °C (98.5 °F) 10/17/24 0700   Pulse 72 10/17/24 1025   Resp 17 10/17/24 0700   SpO2 99 % 10/17/24 0700         Event Time   Out of Recovery 10/16/2024 15:45:00         Pain/Germán Score: Pain Rating Prior to Med Admin: 7 (10/17/2024  6:15 AM)  Pain Rating Post Med Admin: 0 (10/17/2024  1:21 AM)

## 2024-10-17 NOTE — NURSING TRANSFER
Nursing Transfer Note      10/16/2024   8:53 PM    Nurse giving handoff:Joanne  Nurse receiving handoff:Galina    Reason patient is being transferred: Delivered    Transfer To: 2118    Transfer via bed      Transported by TAVON Rubio    Transfer Vital Signs:  Blood Pressure:126/67  Heart Rate:67  O2:100  Temperature:98.1  Respirations:16    Order for Tele Monitor? No    Additional Lines: Leigh Catheter    Medicines sent: none    Any special needs or follow-up needed: pain, vaginal bleeding, fundal height    Patient belongings transferred with patient: Yes    Chart send with patient: Yes    Notified: spouse    Patient reassessed at: 10/16/24 @ 2050 (date, time)  1  Upon arrival to floor: patient oriented to room, call bell in reach, and bed in lowest position

## 2024-10-17 NOTE — HOSPITAL COURSE
24-year-old  2 para 2 now admitted secondary to elevated blood pressures and persistent headache.    Intrauterine pregnancy at 34 weeks 4 days with gestational hypertension and headache   Proteinuria is 324 mg of protein   Patient has a history of uncontrolled elevated severe blood pressures at 39 weeks gestation previous pregnancy which was a year ago.  Blood pressure is currently look good patient continues to have headache.  I am going to order an MRI of the brain.  Patient has anemia and she has trouble getting to appointments some going to go ahead and order an iron infusion today here as I do not suspect she will be able to get this done outpatient   CBC and CMP are normal as far as liver functions and platelets.  Patient will receive Fioricet this morning for headache and see if that helps better than the hydroxyzine and tramadol that was previously ordered.     Proceeding to repeat  section secondary to elevated blood pressures into the severe range and unrelenting headache.  Patient did receive an IV iron infusion yesterday.  Known anemia.  We will plan magnesium prophylaxis postpartum      Full workup was performed and blood pressures were labile and erratic.  Finally blood pressures started to trend upwards despite medication and patient had received her Celestone for fetal lung maturity and we proceeded to repeat  section secondary to severe criteria with persistent headache and elevated blood pressure.  Postoperatively the patient is improved with blood pressures she is on labetalol 100 mg twice a day

## 2024-10-17 NOTE — PROGRESS NOTES
[facility]  Obstetrics  Postpartum Progress Note    Patient Name: Zoran Dubose  MRN: [id number]  Admission Date: 10/14/2024  Hospital Length of Stay: 2 days  Attending Physician: Marya Spain MD  Primary Care Provider: June Zaragoza MD    Subjective:     Principal Problem:Gestational hypertension, third trimester    Hospital Course:  24-year-old  2 para 2 now admitted secondary to elevated blood pressures and persistent headache.  Full workup was performed and blood pressures were labile and erratic.  Finally blood pressures started to trend upwards despite medication and patient had received her Celestone for fetal lung maturity and we proceeded to repeat  section secondary to severe criteria with persistent headache and elevated blood pressure.  Postoperatively the patient is improved with blood pressures she is on labetalol 100 mg twice a day    Interval History:  Status post repeat  section for severe preeclampsia    She is doing well this morning. She is tolerating a regular diet without nausea or vomiting. She is voiding spontaneously. She is ambulating. She has passed flatus, and has not a BM. Vaginal bleeding is mild. She denies fever or chills. Abdominal pain is mild and controlled with oral medications. She Is breastfeeding. She desires circumcision for her male baby: not applicable.    Objective:     Vital Signs (Most Recent):  Temp: 98.5 °F (36.9 °C) (10/17/24 0700)  Pulse: 63 (10/17/24 0700)  Resp: 17 (10/17/24 0700)  BP: 118/85 (10/17/24 0700)  SpO2: 99 % (10/17/24 0700) Vital Signs (24h Range):  Temp:  [98.1 °F (36.7 °C)-98.6 °F (37 °C)] 98.5 °F (36.9 °C)  Pulse:  [] 63  Resp:  [16-20] 17  SpO2:  [97 %-100 %] 99 %  BP: (116-178)/() 118/85     Weight: 108.9 kg (240 lb)  Body mass index is 43.9 kg/m².      Intake/Output Summary (Last 24 hours) at 10/17/2024 0861  Last data filed at 10/17/2024 0630  Gross per 24 hour   Intake 1650 ml    Output 1832 ml   Net -182 ml         Significant Labs:  Lab Results   Component Value Date    GROUPTRH A POS 10/16/2024    HEPBSAG Negative 2024    STREPBCULT unknown 2023     Recent Labs   Lab 10/16/24  1926   HGB 9.0*   HCT 30.5*       CBC:   Recent Labs   Lab 10/16/24  1926   WBC 20.14*   RBC 4.33   HGB 9.0*   HCT 30.5*      MCV 70*   MCH 20.8*   MCHC 29.5*     I have personallly reviewed all pertinent lab results from the last 24 hours.    Physical Exam  Patient doing much better today headache is spontaneously relieved with delivery   118/85 patient is on 100 of labetalol twice a day blood pressures are stable with that   Dressing dry   Extremities no Homans or edema    Review of Systems  Assessment/Plan:     24 y.o. female  for:    * Gestational hypertension, third trimester  Intrauterine pregnancy at 3 4 weeks 4 days with gestational hypertension and headache   Proteinuria is 324 mg of protein   Patient has a history of uncontrolled elevated severe blood pressures at 39 weeks gestation previous pregnancy which was a year ago.  Blood pressure is currently look good patient continues to have headache.  I am going to order an MRI of the brain.  Patient has anemia and she has trouble getting to appointments some going to go ahead and order an iron infusion today here as I do not suspect she will be able to get this done outpatient   CBC and CMP are normal as far as liver functions and platelets.  Patient will receive Fioricet this morning for headache and see if that helps better than the hydroxyzine and tramadol that was previously ordered.    Proceeding to repeat  section secondary to elevated blood pressures into the severe range and unrelenting headache.  Patient did receive an IV iron infusion yesterday.  Known anemia.  We will plan magnesium prophylaxis postpartum    Postoperative day 1 repeat  section   Blood pressures improved on 100 of labetalol twice a day    Continue postoperative care  Rh positive        Disposition: As patient meets milestones, will plan to discharge 1-2 day.    Marya Spain MD  [facility]

## 2024-10-17 NOTE — PLAN OF CARE
[facility]  Discharge Assessment    Primary Care Provider: June Zaragoza MD     OB Screen completed using health record, no needs anticipated at this time, and no consult(s).    OB Screen (most recent)       OB Screen - 10/17/24 1023          OB SCREEN    Assessment Type Discharge Planning Assessment     Source of Information patient     Received Prenatal Care Yes     Any indications/suspicions for None     Is this a teen pregnancy No     Is the baby in NICU Yes     Indication for adoption/[facility] Haven No     Indication for DME/post-acute needs No     HIV (+) No     Any congenital  disorders No     Fetal demise/ death No

## 2024-10-17 NOTE — NURSING
OBGYN LABS ENTERED INTO RESULTS CONSOLE      1st Trimester Labs Entered Into Results Console     [] AOBRH   [x] Rubella Immune   [x] RPR   [x] HBsAg   [x] HIV   [x] Chlamydia   [x] Gonorrhea   [] Cell-Free DNA   [x] Hep-C   [] Varicella    2nd Trimester Labs Entered Into Results Console     [x]OB Glucose Screen      3rd Trimester Labs Entered Into Results Console      [] GBS   [] RPR    Drug Screen Entered Into Results Console     [] Benzodiazes   [] Methadone   [] Cocaine (Metab)   [] Opiate   [] Amphetamine   [] Marijuana   [] Creatinine   [] Amphetamines-Beaker   [] Barbituates-Beaker   [] Benzodiazepine-Beaker   [] Cannabinoids-Beaker   [] Cocaine-Beaker   [] Fentanyl-Beaker   [] MDMA-Beaker   [] Opiates-Beaker   [] Phencyclidine-Beaker        Results Entered by Joanne Moreno, RN    Results Verified for Manual Entry Accuracy by Louise Vo RN

## 2024-10-17 NOTE — SUBJECTIVE & OBJECTIVE
Interval History:  Status post repeat  section for severe preeclampsia    She is doing well this morning. She is tolerating a regular diet without nausea or vomiting. She is voiding spontaneously. She is ambulating. She has passed flatus, and has not a BM. Vaginal bleeding is mild. She denies fever or chills. Abdominal pain is mild and controlled with oral medications. She Is breastfeeding. She desires circumcision for her male baby: not applicable.    Objective:     Vital Signs (Most Recent):  Temp: 98.5 °F (36.9 °C) (10/17/24 0700)  Pulse: 63 (10/17/24 0700)  Resp: 17 (10/17/24 0700)  BP: 118/85 (10/17/24 0700)  SpO2: 99 % (10/17/24 0700) Vital Signs (24h Range):  Temp:  [98.1 °F (36.7 °C)-98.6 °F (37 °C)] 98.5 °F (36.9 °C)  Pulse:  [] 63  Resp:  [16-20] 17  SpO2:  [97 %-100 %] 99 %  BP: (116-178)/() 118/85     Weight: 108.9 kg (240 lb)  Body mass index is 43.9 kg/m².      Intake/Output Summary (Last 24 hours) at 10/17/2024 0822  Last data filed at 10/17/2024 0630  Gross per 24 hour   Intake 1650 ml   Output 1832 ml   Net -182 ml         Significant Labs:  Lab Results   Component Value Date    GROUPTRH A POS 10/16/2024    HEPBSAG Negative 2024    STREPBCULT unknown 2023     Recent Labs   Lab 10/16/24  1926   HGB 9.0*   HCT 30.5*       CBC:   Recent Labs   Lab 10/16/24  1926   WBC 20.14*   RBC 4.33   HGB 9.0*   HCT 30.5*      MCV 70*   MCH 20.8*   MCHC 29.5*     I have personallly reviewed all pertinent lab results from the last 24 hours.    Physical Exam  Patient doing much better today headache is spontaneously relieved with delivery   118/85 patient is on 100 of labetalol twice a day blood pressures are stable with that   Dressing dry   Extremities no Homans or edema    Review of Systems

## 2024-10-18 PROCEDURE — 12000002 HC ACUTE/MED SURGE SEMI-PRIVATE ROOM

## 2024-10-18 PROCEDURE — 25000003 PHARM REV CODE 250: Performed by: SPECIALIST

## 2024-10-18 RX ADMIN — DOCUSATE SODIUM 200 MG: 100 CAPSULE, LIQUID FILLED ORAL at 08:10

## 2024-10-18 RX ADMIN — IBUPROFEN 600 MG: 200 TABLET, FILM COATED ORAL at 06:10

## 2024-10-18 RX ADMIN — OXYCODONE HYDROCHLORIDE AND ACETAMINOPHEN 1 TABLET: 10; 325 TABLET ORAL at 02:10

## 2024-10-18 RX ADMIN — SENNOSIDES AND DOCUSATE SODIUM 1 TABLET: 50; 8.6 TABLET ORAL at 04:10

## 2024-10-18 RX ADMIN — LABETALOL HYDROCHLORIDE 100 MG: 100 TABLET, FILM COATED ORAL at 08:10

## 2024-10-18 RX ADMIN — OXYCODONE HYDROCHLORIDE AND ACETAMINOPHEN 1 TABLET: 10; 325 TABLET ORAL at 08:10

## 2024-10-18 RX ADMIN — DOCUSATE SODIUM 200 MG: 100 CAPSULE, LIQUID FILLED ORAL at 09:10

## 2024-10-18 RX ADMIN — LABETALOL HYDROCHLORIDE 100 MG: 100 TABLET, FILM COATED ORAL at 09:10

## 2024-10-18 RX ADMIN — IBUPROFEN 600 MG: 200 TABLET, FILM COATED ORAL at 12:10

## 2024-10-18 RX ADMIN — OXYCODONE HYDROCHLORIDE AND ACETAMINOPHEN 1 TABLET: 10; 325 TABLET ORAL at 03:10

## 2024-10-18 RX ADMIN — OXYCODONE HYDROCHLORIDE AND ACETAMINOPHEN 1 TABLET: 10; 325 TABLET ORAL at 09:10

## 2024-10-18 RX ADMIN — SIMETHICONE 80 MG: 80 TABLET, CHEWABLE ORAL at 08:10

## 2024-10-18 NOTE — LACTATION NOTE
10/18/24 1250   Breast Pumping   Breast Pumping Interventions frequent pumping encouraged;post-feed pumping encouraged   Breast Pumping double electric breast pump utilized     Mother currently using her Aiyln Motif to pump. Denies pain with pumping. She reports she is getting 1-2 ml of colostrum with each pumping session. Reinforced pumping frequency of q3 hrs, washing pump parts after each use, and sanitizing q 24 hrs. Instructed to call me with breastfeeding needs, questions concerns. Mother verbalizes understanding of all above instructions with good recall.

## 2024-10-18 NOTE — SUBJECTIVE & OBJECTIVE
Interval History: PPD #2    Regular diet, ambulating,  Passing gas  Abd-good bowel sounds,  Extr +1 edema    Objective:     Vital Signs (Most Recent):  Temp: 97.5 °F (36.4 °C) (10/18/24 1156)  Pulse: 88 (10/18/24 1156)  Resp: 18 (10/18/24 1156)  BP: (!) 132/90 (notified nurse) (10/18/24 1156)  SpO2: 100 % (10/18/24 1156) Vital Signs (24h Range):  Temp:  [97.5 °F (36.4 °C)-98.8 °F (37.1 °C)] 97.5 °F (36.4 °C)  Pulse:  [] 88  Resp:  [16-18] 18  SpO2:  [99 %-100 %] 100 %  BP: (117-140)/(76-90) 132/90     Weight: 108.9 kg (240 lb)  Body mass index is 43.9 kg/m².    No intake or output data in the 24 hours ending 10/18/24 1427      Significant Labs:  Lab Results   Component Value Date    GROUPTRH A POS 10/16/2024    HEPBSAG Negative 04/08/2024    STREPBCULT unknown 11/19/2023     Recent Labs   Lab 10/16/24  1926   HGB 9.0*   HCT 30.5*       I have personallly reviewed all pertinent lab results from the last 24 hours.    Physical Exam    Review of Systems

## 2024-10-18 NOTE — LACTATION NOTE
This note was copied from a baby's chart.     10/17/24 1700   Maternal Assessment   Breast Size Issue none   Breast Shape round   Breast Density soft   Areola elastic   Nipples everted   Maternal Infant Feeding   Maternal Emotional State assist needed   Infant Positioning clutch/football   Signs of Milk Transfer audible swallow;infant jaw motion present   Equipment Type   Breast Pump Type double electric, hospital grade   Breast Pump Flange Type hard   Breast Pump Flange Size 24 mm   Breast Pumping   Breast Pumping Interventions frequent pumping encouraged;post-feed pumping encouraged   Breast Pumping double electric breast pump utilized       Mom @ baby's beside in NICU. Assisted with position & latch. Baby reluctant to latch on at first but after a few minutes was able to get baby to latch on. Baby has uncoordinated suck & swallow. Lots of stimulation to keep baby awake to nurse. Baby nursed for 5 minutes & released the breast & refused to latch back on. Placed baby skin to skin on mom's chest.     Instructed mom to pump after the visiting baby in the NICU. Mom reports pumping every 3 hours. Reinforced cleaning pump parts after every pumping session & sanitizing pump parts once every 24 hours. Mom verbalized understanding

## 2024-10-18 NOTE — ASSESSMENT & PLAN NOTE
Postoperative day 2 repeat  section   Blood pressures improved on 100 of labetalol twice a day   Continue postoperative care  Rh positive

## 2024-10-18 NOTE — PROGRESS NOTES
[facility]  Obstetrics  Postpartum Progress Note    Patient Name: Zoran Dubose  MRN: [id number]  Admission Date: 10/14/2024  Hospital Length of Stay: 3 days  Attending Physician: Marya Spain MD  Primary Care Provider: June Zaragoza MD    Subjective:     Principal Problem:Gestational hypertension, third trimester    Hospital Course:  24-year-old  2 para 2 now admitted secondary to elevated blood pressures and persistent headache.    Intrauterine pregnancy at 34 weeks 4 days with gestational hypertension and headache   Proteinuria is 324 mg of protein   Patient has a history of uncontrolled elevated severe blood pressures at 39 weeks gestation previous pregnancy which was a year ago.  Blood pressure is currently look good patient continues to have headache.  I am going to order an MRI of the brain.  Patient has anemia and she has trouble getting to appointments some going to go ahead and order an iron infusion today here as I do not suspect she will be able to get this done outpatient   CBC and CMP are normal as far as liver functions and platelets.  Patient will receive Fioricet this morning for headache and see if that helps better than the hydroxyzine and tramadol that was previously ordered.     Proceeding to repeat  section secondary to elevated blood pressures into the severe range and unrelenting headache.  Patient did receive an IV iron infusion yesterday.  Known anemia.  We will plan magnesium prophylaxis postpartum      Full workup was performed and blood pressures were labile and erratic.  Finally blood pressures started to trend upwards despite medication and patient had received her Celestone for fetal lung maturity and we proceeded to repeat  section secondary to severe criteria with persistent headache and elevated blood pressure.  Postoperatively the patient is improved with blood pressures she is on labetalol 100 mg twice a day    Interval History:  PPD #2    Regular diet, ambulating,  Passing gas  Abd-good bowel sounds,  Extr +1 edema    Objective:     Vital Signs (Most Recent):  Temp: 97.5 °F (36.4 °C) (10/18/24 1156)  Pulse: 88 (10/18/24 1156)  Resp: 18 (10/18/24 1156)  BP: (!) 132/90 (notified nurse) (10/18/24 1156)  SpO2: 100 % (10/18/24 1156) Vital Signs (24h Range):  Temp:  [97.5 °F (36.4 °C)-98.8 °F (37.1 °C)] 97.5 °F (36.4 °C)  Pulse:  [] 88  Resp:  [16-18] 18  SpO2:  [99 %-100 %] 100 %  BP: (117-140)/(76-90) 132/90     Weight: 108.9 kg (240 lb)  Body mass index is 43.9 kg/m².    No intake or output data in the 24 hours ending 10/18/24 1427      Significant Labs:  Lab Results   Component Value Date    GROUPTRH A POS 10/16/2024    HEPBSAG Negative 2024    STREPBCULT unknown 2023     Recent Labs   Lab 10/16/24  1926   HGB 9.0*   HCT 30.5*       I have personallly reviewed all pertinent lab results from the last 24 hours.    Physical Exam    Review of Systems  Assessment/Plan:     24 y.o. female  for:    * Gestational hypertension, third trimester  Postoperative day 2 repeat  section   Blood pressures improved on 100 of labetalol twice a day   Continue postoperative care  Rh positive        Disposition: As patient meets milestones, will plan to discharge when ready.    Adele Patel MD  Obstetrics  [facility]

## 2024-10-19 PROCEDURE — 12000002 HC ACUTE/MED SURGE SEMI-PRIVATE ROOM

## 2024-10-19 PROCEDURE — 25000003 PHARM REV CODE 250: Performed by: ANESTHESIOLOGY

## 2024-10-19 PROCEDURE — 25000003 PHARM REV CODE 250: Performed by: SPECIALIST

## 2024-10-19 RX ADMIN — DOCUSATE SODIUM 200 MG: 100 CAPSULE, LIQUID FILLED ORAL at 08:10

## 2024-10-19 RX ADMIN — LABETALOL HYDROCHLORIDE 100 MG: 100 TABLET, FILM COATED ORAL at 08:10

## 2024-10-19 RX ADMIN — OXYCODONE HYDROCHLORIDE 10 MG: 5 TABLET ORAL at 08:10

## 2024-10-19 RX ADMIN — IBUPROFEN 600 MG: 200 TABLET, FILM COATED ORAL at 01:10

## 2024-10-19 RX ADMIN — OXYCODONE HYDROCHLORIDE AND ACETAMINOPHEN 1 TABLET: 10; 325 TABLET ORAL at 09:10

## 2024-10-19 RX ADMIN — IBUPROFEN 600 MG: 200 TABLET, FILM COATED ORAL at 08:10

## 2024-10-19 RX ADMIN — LABETALOL HYDROCHLORIDE 100 MG: 100 TABLET, FILM COATED ORAL at 09:10

## 2024-10-19 RX ADMIN — DOCUSATE SODIUM 200 MG: 100 CAPSULE, LIQUID FILLED ORAL at 09:10

## 2024-10-19 RX ADMIN — OXYCODONE HYDROCHLORIDE AND ACETAMINOPHEN 1 TABLET: 10; 325 TABLET ORAL at 05:10

## 2024-10-19 RX ADMIN — OXYCODONE HYDROCHLORIDE AND ACETAMINOPHEN 1 TABLET: 10; 325 TABLET ORAL at 02:10

## 2024-10-19 NOTE — SUBJECTIVE & OBJECTIVE
"Interval History: s/p repeat c/section    She is doing well this morning. She is tolerating a regular diet without nausea or vomiting. She is voiding spontaneously. She is ambulating. She has passed flatus, and has not a BM. Vaginal bleeding is mild. She denies fever or chills. Abdominal pain is mild and controlled with oral medications. She Is breastfeeding. She desires circumcision for her male baby: not applicable.    Objective:     Vital Signs (Most Recent):  Temp: 98.2 °F (36.8 °C) (10/19/24 0734)  Pulse: 95 (10/19/24 0734)  Resp: 18 (10/19/24 0854)  BP: 139/81 (10/19/24 0734)  SpO2: 99 % (10/19/24 0734) Vital Signs (24h Range):  Temp:  [98.2 °F (36.8 °C)-98.5 °F (36.9 °C)] 98.2 °F (36.8 °C)  Pulse:  [] 95  Resp:  [16-20] 18  SpO2:  [97 %-99 %] 99 %  BP: (133-144)/(72-81) 139/81     Weight: 108.9 kg (240 lb)  Body mass index is 43.9 kg/m².    No intake or output data in the 24 hours ending 10/19/24 1219      Significant Labs:  Lab Results   Component Value Date    GROUPTRH A POS 10/16/2024    HEPBSAG Negative 04/08/2024    STREPBCULT unknown 11/19/2023     No results for input(s): "HGB", "HCT" in the last 48 hours.    I have personallly reviewed all pertinent lab results from the last 24 hours.    Physical Exam  Gen - NAD  Uterus - firm below umbilicus, non tender  Ext - no edema, no calf tenderness  Incision - mepilex in place, dry     Review of Systems  "

## 2024-10-19 NOTE — ASSESSMENT & PLAN NOTE
S/p repeat c/section POD#3  Severe preeclampsia - BPs normal to mildly elevated, continue labetalol 100 mg bid  Baby doing well in NICU  Continue postpartum care

## 2024-10-19 NOTE — PROGRESS NOTES
[facility]  Obstetrics  Postpartum Progress Note    Patient Name: Zoran Dubose  MRN: [id number]  Admission Date: 10/14/2024  Hospital Length of Stay: 4 days  Attending Physician: Marya Spain MD  Primary Care Provider: June Zaragoza MD    Subjective:     Principal Problem:Gestational hypertension, third trimester    Hospital Course:  24-year-old  2 para 2 now admitted secondary to elevated blood pressures and persistent headache.    Intrauterine pregnancy at 34 weeks 4 days with gestational hypertension and headache   Proteinuria is 324 mg of protein   Patient has a history of uncontrolled elevated severe blood pressures at 39 weeks gestation previous pregnancy which was a year ago.  Blood pressure is currently look good patient continues to have headache.  I am going to order an MRI of the brain.  Patient has anemia and she has trouble getting to appointments some going to go ahead and order an iron infusion today here as I do not suspect she will be able to get this done outpatient   CBC and CMP are normal as far as liver functions and platelets.  Patient will receive Fioricet this morning for headache and see if that helps better than the hydroxyzine and tramadol that was previously ordered.     Proceeding to repeat  section secondary to elevated blood pressures into the severe range and unrelenting headache.  Patient did receive an IV iron infusion yesterday.  Known anemia.  We will plan magnesium prophylaxis postpartum      Full workup was performed and blood pressures were labile and erratic.  Finally blood pressures started to trend upwards despite medication and patient had received her Celestone for fetal lung maturity and we proceeded to repeat  section secondary to severe criteria with persistent headache and elevated blood pressure.  Postoperatively the patient is improved with blood pressures she is on labetalol 100 mg twice a day    Interval History:  "s/p repeat c/section    She is doing well this morning. She is tolerating a regular diet without nausea or vomiting. She is voiding spontaneously. She is ambulating. She has passed flatus, and has not a BM. Vaginal bleeding is mild. She denies fever or chills. Abdominal pain is mild and controlled with oral medications. She Is breastfeeding. She desires circumcision for her male baby: not applicable.    Objective:     Vital Signs (Most Recent):  Temp: 98.2 °F (36.8 °C) (10/19/24 0734)  Pulse: 95 (10/19/24 07)  Resp: 18 (10/19/24 0854)  BP: 139/81 (10/19/24 0734)  SpO2: 99 % (10/19/24 0734) Vital Signs (24h Range):  Temp:  [98.2 °F (36.8 °C)-98.5 °F (36.9 °C)] 98.2 °F (36.8 °C)  Pulse:  [] 95  Resp:  [16-20] 18  SpO2:  [97 %-99 %] 99 %  BP: (133-144)/(72-81) 139/81     Weight: 108.9 kg (240 lb)  Body mass index is 43.9 kg/m².    No intake or output data in the 24 hours ending 10/19/24 1219      Significant Labs:  Lab Results   Component Value Date    GROUPTRH A POS 10/16/2024    HEPBSAG Negative 2024    STREPBCULT unknown 2023     No results for input(s): "HGB", "HCT" in the last 48 hours.    I have personallly reviewed all pertinent lab results from the last 24 hours.    Physical Exam  Gen - NAD  Uterus - firm below umbilicus, non tender  Ext - no edema, no calf tenderness  Incision - mepilex in place, dry     Review of Systems  Assessment/Plan:     24 y.o. female  for:    * Gestational hypertension, third trimester  S/p repeat c/section POD#3  Severe preeclampsia - BPs normal to mildly elevated, continue labetalol 100 mg bid  Baby doing well in NICU  Continue postpartum care        .    Xenia Vallejo MD  Obstetrics  [facility]      "

## 2024-10-20 VITALS
SYSTOLIC BLOOD PRESSURE: 135 MMHG | BODY MASS INDEX: 44.16 KG/M2 | OXYGEN SATURATION: 100 % | DIASTOLIC BLOOD PRESSURE: 89 MMHG | RESPIRATION RATE: 16 BRPM | WEIGHT: 240 LBS | TEMPERATURE: 98 F | HEART RATE: 86 BPM | HEIGHT: 62 IN

## 2024-10-20 PROCEDURE — 63600175 PHARM REV CODE 636 W HCPCS: Performed by: SPECIALIST

## 2024-10-20 PROCEDURE — 90471 IMMUNIZATION ADMIN: CPT | Performed by: SPECIALIST

## 2024-10-20 PROCEDURE — 3E0234Z INTRODUCTION OF SERUM, TOXOID AND VACCINE INTO MUSCLE, PERCUTANEOUS APPROACH: ICD-10-PCS | Performed by: OBSTETRICS & GYNECOLOGY

## 2024-10-20 PROCEDURE — 90715 TDAP VACCINE 7 YRS/> IM: CPT | Performed by: SPECIALIST

## 2024-10-20 PROCEDURE — 25000003 PHARM REV CODE 250: Performed by: SPECIALIST

## 2024-10-20 RX ORDER — OXYCODONE AND ACETAMINOPHEN 5; 325 MG/1; MG/1
1-2 TABLET ORAL EVERY 4 HOURS PRN
Qty: 30 TABLET | Refills: 0 | Status: ON HOLD | OUTPATIENT
Start: 2024-10-20 | End: 2024-10-22 | Stop reason: CLARIF

## 2024-10-20 RX ORDER — IBUPROFEN 600 MG/1
600 TABLET ORAL EVERY 6 HOURS PRN
Qty: 30 TABLET | Refills: 0 | Status: ON HOLD | OUTPATIENT
Start: 2024-10-20 | End: 2024-10-22 | Stop reason: CLARIF

## 2024-10-20 RX ORDER — LABETALOL 100 MG/1
100 TABLET, FILM COATED ORAL EVERY 12 HOURS
Qty: 60 TABLET | Refills: 1 | Status: SHIPPED | OUTPATIENT
Start: 2024-10-20 | End: 2025-10-20

## 2024-10-20 RX ADMIN — CLOSTRIDIUM TETANI TOXOID ANTIGEN (FORMALDEHYDE INACTIVATED), CORYNEBACTERIUM DIPHTHERIAE TOXOID ANTIGEN (FORMALDEHYDE INACTIVATED), BORDETELLA PERTUSSIS TOXOID ANTIGEN (GLUTARALDEHYDE INACTIVATED), BORDETELLA PERTUSSIS FILAMENTOUS HEMAGGLUTININ ANTIGEN (FORMALDEHYDE INACTIVATED), BORDETELLA PERTUSSIS PERTACTIN ANTIGEN, AND BORDETELLA PERTUSSIS FIMBRIAE 2/3 ANTIGEN 0.5 ML: 5; 2; 2.5; 5; 3; 5 INJECTION, SUSPENSION INTRAMUSCULAR at 05:10

## 2024-10-20 RX ADMIN — OXYCODONE HYDROCHLORIDE AND ACETAMINOPHEN 1 TABLET: 10; 325 TABLET ORAL at 08:10

## 2024-10-20 RX ADMIN — OXYCODONE HYDROCHLORIDE AND ACETAMINOPHEN 1 TABLET: 10; 325 TABLET ORAL at 03:10

## 2024-10-20 RX ADMIN — DOCUSATE SODIUM 200 MG: 100 CAPSULE, LIQUID FILLED ORAL at 08:10

## 2024-10-20 RX ADMIN — LABETALOL HYDROCHLORIDE 100 MG: 100 TABLET, FILM COATED ORAL at 08:10

## 2024-10-20 NOTE — PLAN OF CARE
10/20/24 1001   Final Note   Assessment Type Final Discharge Note   Anticipated Discharge Disposition Home   Post-Acute Status   Discharge Delays None known at this time     Patient cleared for discharge from case management standpoint.

## 2024-10-20 NOTE — DISCHARGE SUMMARY
[facility]  Discharge Summary  Obstetrics - Triage      Admit Date: 10/14/2024    Discharge Date and Time:  10/20/2024 9:42 AM    Attending Physician: Marya Spain MD     Discharge Provider: Xenia Valeljo    Reason for Admission:  IUP at 34.5, severe [facility]    Hospital Course (synopsis of major diagnoses, care, treatment, and services provided during the course of the hospital stay):     Zoran Dubose is a 24 y.o.  female who presented to labor and delivery at 34.5 weeks gestational age for elevated blood pressures.  She had a repeat  at 34.5 weeks secondary to severe gestational hypertension.  Her surgery was uncomplicated and the baby did well in the NICU.  .  Blood pressures were well-controlled with oral labetalol throughout her postpartum stay.  Her vital signs have been stable and she was afebrile.  On postop day 4. She was without complaints, her vaginal bleeding is minimal, she was passing gas tolerating a regular diet, pain is well-controlled with oral medication.  She will be discharged home with prescriptions for pain medication and labetalol, and preeclampsia precautions.  She will follow up with Dr. Spain in 1 week.      Gen - NAD  Uterus - firm below umbilicus, non tender  Ext - trace edema bilateral lower extremity, no calf tenderness  Incision - mepilex in place, dry     She was admitted to the labor and delivery triage area. Vital signs on admit were: Temp: 98.3 °F (36.8 °C), Pulse: (!) 112, Resp: 18, BP: (!) 143/63, SpO2: 100 %.      Final Diagnoses:    Principal Problem: Gestational hypertension, third trimester   Secondary Diagnoses:  none    Discharged Condition: good    Disposition: Home or Self Care    Follow Up/Patient Instructions:     Medications:  Reconciled Home Medications:      Medication List        START taking these medications      labetaloL 100 MG tablet  Commonly known as: NORMODYNE  Take 1 tablet (100 mg total) by mouth every 12 (twelve) hours.             CHANGE how you take these medications      ibuprofen 600 MG tablet  Commonly known as: ADVIL,MOTRIN  Take 1 tablet (600 mg total) by mouth every 6 (six) hours as needed.  What changed: reasons to take this     oxyCODONE-acetaminophen 5-325 mg per tablet  Commonly known as: PERCOCET  Take 1-2 tablets by mouth every 4 (four) hours as needed for Pain.  What changed: how much to take            CONTINUE taking these medications      PRENA1 ORAL  Take by mouth.            STOP taking these medications      acetaminophen 650 MG Tbsr  Commonly known as: TYLENOL     aspirin 81 MG Chew     dexchlorphen-phenylephrine-DM 1-5-10 mg/5 mL Syrp  Commonly known as: POLYTUSSIN DM(DEXCHLORPHENRMN)     fluticasone propionate 50 mcg/actuation nasal spray  Commonly known as: FLONASE     hydrALAZINE 50 MG tablet  Commonly known as: APRESOLINE     NIFEdipine 30 MG (OSM) 24 hr tablet  Commonly known as: PROCARDIA-XL     NIFEdipine 60 MG (OSM) 24 hr tablet  Commonly known as: PROCARDIA XL     ondansetron 4 MG tablet  Commonly known as: ZOFRAN     PRENATAL 1+1 ORAL     promethazine 25 MG tablet  Commonly known as: PHENERGAN            Discharge Procedure Orders   Diet Adult Regular     Lifting restrictions   Scheduling Instructions: No lifting greater than 10 # for 6 weeks     No driving until:   Order Comments: No driving for 2 weeks      Follow-up Information       Marya Spain MD. Schedule an appointment as soon as possible for a visit in 1 week(s).    Specialties: Obstetrics, Obstetrics and Gynecology  Why: For follow up  Contact information:  [address]  [address]  [facility]  [place] [address]  [phone]

## 2024-10-20 NOTE — DISCHARGE INSTRUCTIONS
FOLLOW UP WITH YOUR DOCTOR IN 1 WEEK OR SOONER FOR ANY PROBLEMS.    Pelvic rest for 6 weeks (no sex, tampons, douching, nothing in the vagina)   You can experience vaginal bleeding on and off for up to 6 weeks, it will gradually get lighter and the color will change from bright red to a brownish discharge towards the end.     Activity:   NO strenuous activities or exercising for 6 weeks. Do not /lift anything over 15 pounds and no heavy housework or cleaning for 6 weeks. Limit stair climbing to twice a day during the first 2 weeks.   NO driving for 4 weeks. You may take short car trips but do not drive.   You may shower ONLY for the first 2 weeks, after 2 weeks you can soak in a bathtub. Use a mild soap, no heavy perfumes or fragrances to avoid irritation.   Walking frequently following a  delivery promotes healing and decreases pain associated with gas.   If constipation develops: You may take Colace (stool softener), Milk of Magnesia, Dulcolax or Miralax. All of these medications are sold over the counter.   Incision Care:   Clean your incision with mild soap & warm water only- do not scrub- let warm water run over it, then pat dry.     Pain Relief:   You may take Motrin for mild pain & uterine cramping.     Emotional Changes:   You may experience baby blues after delivery. You may feel let down, anxious and cry easily. This is normal. These feelings can begin 2-3 days after delivery and usually disappear in about a week or two. Prolonged sadness may indicate postpartum depression.       ***SEEK IMMEDIATE HELP FROM THE EMERGENCY ROOM IF YOU HAVE ANY CHEST PAIN OR DIFFICULTY BREATHING.    Call your doctor for any of the following:   Problems with any of your medications, inability to eat.   Foul smelling vaginal discharge.   If you notice pus-like drainage from your incision, if your incision or the area around it becomes hot or swollen, or if you notice a foul smelling odor.   Temperature above  100.4.   Heavy vaginal bleeding. All women bleed different after delivery and each delivery is different. Heavy bleeding consists of saturating a treva pad in a 1 hour time period. Passing clots are normal, if you pass a blood clot larger than the size of a golf ball call your doctor's office.   If you experience pain in your legs/calves, if one leg increases in size and becomes swollen or becomes hot to touch or discolored.   Crying or periods of sadness beyond 2 weeks.     If you are breast feeding:   Wash your breasts with mild soap and warm water.   You should wear a supportive bra.   You should continue to take a prenatal vitamin for 6 weeks or until breastfeeding is discontinued.   If nipples are sore, apply a few drops of breast milk after nursing and let air dry or you can use Lanolin cream.   If breasts are engorged, apply warmth and express milk.   [facility] lactation consultant is available at [phone] for your breastfeeding needs.    If you are not breastfeeding:   Wear a tight bra and do not stimulate your breasts. Avoid handling your breasts and do not express milk. You may apply ice packs or cabbage leaves to relieve discomfort from engorgement. If your breasts become warm to touch, reddened or lumps develop call your doctor.

## 2024-10-21 ENCOUNTER — HOSPITAL ENCOUNTER (INPATIENT)
Facility: HOSPITAL | Age: 24
LOS: 1 days | Discharge: HOME | DRG: 776 | End: 2024-10-23
Attending: EMERGENCY MEDICINE | Admitting: SPECIALIST
Payer: MEDICAID

## 2024-10-21 DIAGNOSIS — I10 HTN (HYPERTENSION): ICD-10-CM

## 2024-10-21 DIAGNOSIS — O16.9 HYPERTENSION AFFECTING PREGNANCY: ICD-10-CM

## 2024-10-21 DIAGNOSIS — G44.52 NEW DAILY PERSISTENT HEADACHE: ICD-10-CM

## 2024-10-21 DIAGNOSIS — R60.0 LOWER EXTREMITY EDEMA: ICD-10-CM

## 2024-10-21 DIAGNOSIS — O13.3 GESTATIONAL HYPERTENSION, THIRD TRIMESTER: ICD-10-CM

## 2024-10-21 DIAGNOSIS — R51.9 INTRACTABLE HEADACHE, UNSPECIFIED CHRONICITY PATTERN, UNSPECIFIED HEADACHE TYPE: Primary | ICD-10-CM

## 2024-10-21 DIAGNOSIS — R06.02 SOB (SHORTNESS OF BREATH): ICD-10-CM

## 2024-10-21 LAB
ALBUMIN SERPL BCP-MCNC: 3.4 G/DL (ref 3.5–5.2)
ALP SERPL-CCNC: 89 U/L (ref 55–135)
ALT SERPL W/O P-5'-P-CCNC: 17 U/L (ref 10–44)
ANION GAP SERPL CALC-SCNC: 8 MMOL/L (ref 8–16)
AST SERPL-CCNC: 18 U/L (ref 10–40)
BACTERIA #/AREA URNS HPF: ABNORMAL /HPF
BASOPHILS # BLD AUTO: 0.02 K/UL (ref 0–0.2)
BASOPHILS NFR BLD: 0.2 % (ref 0–1.9)
BILIRUB SERPL-MCNC: 0.2 MG/DL (ref 0.1–1)
BILIRUB UR QL STRIP: NEGATIVE
BNP SERPL-MCNC: 91 PG/ML (ref 0–99)
BUN SERPL-MCNC: 10 MG/DL (ref 6–20)
CALCIUM SERPL-MCNC: 9.1 MG/DL (ref 8.7–10.5)
CHLORIDE SERPL-SCNC: 105 MMOL/L (ref 95–110)
CLARITY UR: ABNORMAL
CO2 SERPL-SCNC: 24 MMOL/L (ref 23–29)
COLOR UR: ABNORMAL
CREAT SERPL-MCNC: 0.7 MG/DL (ref 0.5–1.4)
CREAT UR-MCNC: 131.8 MG/DL (ref 15–325)
DIFFERENTIAL METHOD BLD: ABNORMAL
EOSINOPHIL # BLD AUTO: 0.3 K/UL (ref 0–0.5)
EOSINOPHIL NFR BLD: 2.5 % (ref 0–8)
ERYTHROCYTE [DISTWIDTH] IN BLOOD BY AUTOMATED COUNT: 18.9 % (ref 11.5–14.5)
EST. GFR  (NO RACE VARIABLE): >60 ML/MIN/1.73 M^2
GLUCOSE SERPL-MCNC: 91 MG/DL (ref 70–110)
GLUCOSE UR QL STRIP: NEGATIVE
HCT VFR BLD AUTO: 30 % (ref 37–48.5)
HGB BLD-MCNC: 9 G/DL (ref 12–16)
HGB UR QL STRIP: ABNORMAL
HYALINE CASTS #/AREA URNS LPF: 0 /LPF
IMM GRANULOCYTES # BLD AUTO: 0.12 K/UL (ref 0–0.04)
IMM GRANULOCYTES NFR BLD AUTO: 1 % (ref 0–0.5)
KETONES UR QL STRIP: NEGATIVE
LDH SERPL L TO P-CCNC: 235 U/L (ref 110–260)
LEUKOCYTE ESTERASE UR QL STRIP: ABNORMAL
LYMPHOCYTES # BLD AUTO: 1.9 K/UL (ref 1–4.8)
LYMPHOCYTES NFR BLD: 15.5 % (ref 18–48)
MAGNESIUM SERPL-MCNC: 1.9 MG/DL (ref 1.6–2.6)
MCH RBC QN AUTO: 21.6 PG (ref 27–31)
MCHC RBC AUTO-ENTMCNC: 30 G/DL (ref 32–36)
MCV RBC AUTO: 72 FL (ref 82–98)
MICROSCOPIC COMMENT: ABNORMAL
MONOCYTES # BLD AUTO: 0.8 K/UL (ref 0.3–1)
MONOCYTES NFR BLD: 6.4 % (ref 4–15)
NEUTROPHILS # BLD AUTO: 9.1 K/UL (ref 1.8–7.7)
NEUTROPHILS NFR BLD: 74.4 % (ref 38–73)
NITRITE UR QL STRIP: NEGATIVE
NRBC BLD-RTO: 0 /100 WBC
PH UR STRIP: 8 [PH] (ref 5–8)
PHOSPHATE SERPL-MCNC: 4 MG/DL (ref 2.7–4.5)
PLATELET # BLD AUTO: 482 K/UL (ref 150–450)
PMV BLD AUTO: 9.9 FL (ref 9.2–12.9)
POTASSIUM SERPL-SCNC: 3.7 MMOL/L (ref 3.5–5.1)
PROT SERPL-MCNC: 6.9 G/DL (ref 6–8.4)
PROT UR QL STRIP: ABNORMAL
PROT UR-MCNC: 135 MG/DL (ref 6–15)
PROT/CREAT UR: 1.02 MG/G{CREAT} (ref 0–0.2)
RBC # BLD AUTO: 4.16 M/UL (ref 4–5.4)
RBC #/AREA URNS HPF: >100 /HPF (ref 0–4)
SODIUM SERPL-SCNC: 137 MMOL/L (ref 136–145)
SP GR UR STRIP: 1.02 (ref 1–1.03)
SQUAMOUS #/AREA URNS HPF: 3 /HPF
TROPONIN I SERPL HS-MCNC: <2.3 PG/ML (ref 0–14.9)
URN SPEC COLLECT METH UR: ABNORMAL
UROBILINOGEN UR STRIP-ACNC: ABNORMAL EU/DL
WBC # BLD AUTO: 12.16 K/UL (ref 3.9–12.7)
WBC #/AREA URNS HPF: 50 /HPF (ref 0–5)

## 2024-10-21 PROCEDURE — 93005 ELECTROCARDIOGRAM TRACING: CPT | Performed by: INTERNAL MEDICINE

## 2024-10-21 PROCEDURE — 84484 ASSAY OF TROPONIN QUANT: CPT | Performed by: EMERGENCY MEDICINE

## 2024-10-21 PROCEDURE — 84100 ASSAY OF PHOSPHORUS: CPT | Performed by: EMERGENCY MEDICINE

## 2024-10-21 PROCEDURE — 93010 ELECTROCARDIOGRAM REPORT: CPT | Mod: ,,, | Performed by: INTERNAL MEDICINE

## 2024-10-21 PROCEDURE — 83880 ASSAY OF NATRIURETIC PEPTIDE: CPT | Performed by: EMERGENCY MEDICINE

## 2024-10-21 PROCEDURE — 84156 ASSAY OF PROTEIN URINE: CPT | Performed by: EMERGENCY MEDICINE

## 2024-10-21 PROCEDURE — 81001 URINALYSIS AUTO W/SCOPE: CPT | Performed by: EMERGENCY MEDICINE

## 2024-10-21 PROCEDURE — 80053 COMPREHEN METABOLIC PANEL: CPT | Performed by: EMERGENCY MEDICINE

## 2024-10-21 PROCEDURE — 83615 LACTATE (LD) (LDH) ENZYME: CPT | Performed by: EMERGENCY MEDICINE

## 2024-10-21 PROCEDURE — 85025 COMPLETE CBC W/AUTO DIFF WBC: CPT | Performed by: EMERGENCY MEDICINE

## 2024-10-21 PROCEDURE — 83735 ASSAY OF MAGNESIUM: CPT | Performed by: EMERGENCY MEDICINE

## 2024-10-21 PROCEDURE — 87086 URINE CULTURE/COLONY COUNT: CPT | Performed by: EMERGENCY MEDICINE

## 2024-10-21 PROCEDURE — 25000003 PHARM REV CODE 250: Performed by: EMERGENCY MEDICINE

## 2024-10-21 PROCEDURE — 63600175 PHARM REV CODE 636 W HCPCS: Performed by: EMERGENCY MEDICINE

## 2024-10-21 PROCEDURE — 36415 COLL VENOUS BLD VENIPUNCTURE: CPT | Performed by: EMERGENCY MEDICINE

## 2024-10-21 RX ORDER — DIPHENHYDRAMINE HYDROCHLORIDE 50 MG/ML
12.5 INJECTION INTRAMUSCULAR; INTRAVENOUS
Status: COMPLETED | OUTPATIENT
Start: 2024-10-21 | End: 2024-10-21

## 2024-10-21 RX ORDER — LABETALOL 100 MG/1
100 TABLET, FILM COATED ORAL
Status: COMPLETED | OUTPATIENT
Start: 2024-10-21 | End: 2024-10-21

## 2024-10-21 RX ORDER — LABETALOL HYDROCHLORIDE 5 MG/ML
20 INJECTION, SOLUTION INTRAVENOUS ONCE
Status: DISCONTINUED | OUTPATIENT
Start: 2024-10-21 | End: 2024-10-23 | Stop reason: HOSPADM

## 2024-10-21 RX ORDER — ACETAMINOPHEN 325 MG/1
650 TABLET ORAL ONCE
Status: COMPLETED | OUTPATIENT
Start: 2024-10-21 | End: 2024-10-21

## 2024-10-21 RX ORDER — PROCHLORPERAZINE EDISYLATE 5 MG/ML
10 INJECTION INTRAMUSCULAR; INTRAVENOUS
Status: COMPLETED | OUTPATIENT
Start: 2024-10-21 | End: 2024-10-21

## 2024-10-21 RX ADMIN — PROCHLORPERAZINE EDISYLATE 10 MG: 5 INJECTION INTRAMUSCULAR; INTRAVENOUS at 09:10

## 2024-10-21 RX ADMIN — LABETALOL HYDROCHLORIDE 100 MG: 100 TABLET, FILM COATED ORAL at 05:10

## 2024-10-21 RX ADMIN — DIPHENHYDRAMINE HYDROCHLORIDE 12.5 MG: 50 INJECTION INTRAMUSCULAR; INTRAVENOUS at 09:10

## 2024-10-21 RX ADMIN — ACETAMINOPHEN 650 MG: 325 TABLET ORAL at 05:10

## 2024-10-21 NOTE — ED PROVIDER NOTES
Encounter Date: 10/21/2024       History     Chief Complaint   Patient presents with    hypertention after pregnancy     Headache since discharge yesterday. Pt is hypertensive, sob. No cp     HPI  Patient w/ PMHx gestational HTN, postpartum preE with first pregnancy 1 yr ago, , recently delivered at 34.5 wks 10/16/24 for severe gestational hypertension, discharged yesterday on labetalol 100 mg b.i.d. presents to ED with bifrontal headache that has been ongoing since her delivery, worse today unrelieved by home ibuprofen and mild exertional dyspnea.  Patient states she has not had oral labetalol since she was discharged around 12:00 p.m. yesterday and was concerned that her blood pressure could be high.  She denies any chest pain, visual disturbance, focal weakness, paresthesias.  She has mild lower abdominal pain in association with her .  She is having some vaginal bleeding that has been stable.  Also notes some pedal edema, right > left.  She denies any fevers, nausea or vomiting or other complaints.    Review of patient's allergies indicates:  No Known Allergies  Past Medical History:   Diagnosis Date    Hypertension      Past Surgical History:   Procedure Laterality Date     SECTION N/A 2023    Procedure:  SECTION;  Surgeon: Marya Spain MD;  Location: [facility] L&D;  Service: OB/GYN;  Laterality: N/A;     SECTION N/A 10/16/2024    Procedure:  SECTION;  Surgeon: Marya Spain MD;  Location: [facility] L&D;  Service: OB/GYN;  Laterality: N/A;     Family History   Problem Relation Name Age of Onset    No Known Problems Mother      Seizures Father      Hypertension Maternal Grandmother      Diabetes Maternal Grandmother      Hypertension Paternal Grandmother      Diabetes Paternal Grandmother      Hypertension Paternal Grandfather      Congenital heart disease Neg Hx      Arrhythmia Neg Hx      Pacemaker/defibrilator Neg Hx       Social History     Tobacco Use     Smoking status: Never   Substance Use Topics    Alcohol use: No    Drug use: Not Currently     Review of Systems   Constitutional:  Negative for fever.   HENT:  Negative for sore throat.    Respiratory:  Positive for shortness of breath.    Cardiovascular:  Positive for leg swelling. Negative for chest pain.   Gastrointestinal:  Negative for nausea.   Genitourinary:  Negative for dysuria.   Musculoskeletal:  Negative for back pain.   Skin:  Negative for rash.   Neurological:  Positive for headaches. Negative for weakness.   Hematological:  Does not bruise/bleed easily.       Physical Exam     Initial Vitals [10/21/24 1729]   BP Pulse Resp Temp SpO2   (!) 176/117 72 16 98.5 °F (36.9 °C) 99 %      MAP       --         Physical Exam    Nursing note and vitals reviewed.  Constitutional: She appears well-developed and well-nourished. No distress.   HENT:   Head: Normocephalic and atraumatic. Mouth/Throat: Oropharynx is clear and moist.   Eyes: EOM are normal. Pupils are equal, round, and reactive to light.   Neck: Neck supple.   Normal range of motion.  Cardiovascular:  Normal rate and regular rhythm.           Pulmonary/Chest: Breath sounds normal. No respiratory distress.   Abdominal: Abdomen is soft. There is no abdominal tenderness.   Surgical dressing in place.  No surrounding erythema or drainage noted. There is no rebound and no guarding.   Musculoskeletal:         General: No tenderness. Normal range of motion.      Cervical back: Normal range of motion and neck supple.      Comments: Trace pedal edema, no significant asymmetry noted     Neurological: She is alert and oriented to person, place, and time. She has normal strength. No cranial nerve deficit. GCS score is 15. GCS eye subscore is 4. GCS verbal subscore is 5. GCS motor subscore is 6.   Skin: Skin is warm and dry. Capillary refill takes less than 2 seconds. No rash noted.   Psychiatric: She has a normal mood and affect. Thought content normal.         ED  Course   Procedures  Labs Reviewed   PROTEIN / CREATININE RATIO, URINE - Abnormal       Result Value    Protein, Urine Random 135 (*)     Creatinine, Urine 131.8      Prot/Creat Ratio, Urine 1.02 (*)     Narrative:     Specimen Source->Urine   URINALYSIS, REFLEX TO URINE CULTURE - Abnormal    Specimen UA Urine, Clean Catch      Color, UA Orange (*)     Appearance, UA Hazy (*)     pH, UA 8.0      Specific Gravity, UA 1.025      Protein, UA 1+ (*)     Glucose, UA Negative      Ketones, UA Negative      Bilirubin (UA) Negative      Occult Blood UA 3+ (*)     Nitrite, UA Negative      Urobilinogen, UA 2.0-3.0 (*)     Leukocytes, UA 2+ (*)     Narrative:     Specimen Source->Urine   CBC W/ AUTO DIFFERENTIAL - Abnormal    WBC 12.16      RBC 4.16      Hemoglobin 9.0 (*)     Hematocrit 30.0 (*)     MCV 72 (*)     MCH 21.6 (*)     MCHC 30.0 (*)     RDW 18.9 (*)     Platelets 482 (*)     MPV 9.9      Immature Granulocytes 1.0 (*)     Gran # (ANC) 9.1 (*)     Immature Grans (Abs) 0.12 (*)     Lymph # 1.9      Mono # 0.8      Eos # 0.3      Baso # 0.02      nRBC 0      Gran % 74.4 (*)     Lymph % 15.5 (*)     Mono % 6.4      Eosinophil % 2.5      Basophil % 0.2      Differential Method Automated     COMPREHENSIVE METABOLIC PANEL - Abnormal    Sodium 137      Potassium 3.7      Chloride 105      CO2 24      Glucose 91      BUN 10      Creatinine 0.7      Calcium 9.1      Total Protein 6.9      Albumin 3.4 (*)     Total Bilirubin 0.2      Alkaline Phosphatase 89      AST 18      ALT 17      eGFR >60.0      Anion Gap 8     URINALYSIS MICROSCOPIC - Abnormal    RBC, UA >100 (*)     WBC, UA 50 (*)     Bacteria None      Squam Epithel, UA 3      Hyaline Casts, UA 0      Microscopic Comment SEE COMMENT      Narrative:     Specimen Source->Urine   CULTURE, URINE   B-TYPE NATRIURETIC PEPTIDE   CBC W/DIFF AND PLATELET COUNT   COMPREHENSIVE METABOLIC PANEL   MAGNESIUM   PHOSPHORUS   TROPONIN I HIGH SENSITIVITY   LACTATE DEHYDROGENASE    MAGNESIUM    Magnesium 1.9     PHOSPHORUS    Phosphorus 4.0     B-TYPE NATRIURETIC PEPTIDE    BNP 91     TROPONIN I HIGH SENSITIVITY    Troponin I High Sensitivity <2.3     LACTATE DEHYDROGENASE           EKG Readings: (Independently Interpreted)   Rhythm: Normal Sinus Rhythm. Ectopy: No Ectopy. Conduction: Normal. ST Segments: Normal ST Segments. T Waves: Normal. Clinical Impression: Normal Sinus Rhythm       Imaging Results              X-Ray Chest 1 View (Final result)  Result time 10/21/24 20:49:05      Final result by Adama Raman MD (10/21/24 20:49:05)                   Impression:      No convincing radiographic evidence of acute intrathoracic process on this single view.      Electronically signed by: Adama Raman MD  Date:    10/21/2024  Time:    20:49               Narrative:    EXAMINATION:  XR CHEST 1 VIEW    CLINICAL HISTORY:  Shortness of breath    TECHNIQUE:  Single frontal view of the chest was performed.    COMPARISON:  06/01/2009    FINDINGS:  Cardiac silhouette is upper limits of normal for size.  Lungs are symmetrically expanded without evidence of confluent airspace consolidation.  No significant volume of pleural fluid or pneumothorax identified.  Osseous structures appear intact.                                       US Lower Extremity Veins Bilateral (Final result)  Result time 10/21/24 19:48:19      Final result by Immanuel Olivia MD (10/21/24 19:48:19)                   Impression:      No evidence of acute deep venous thrombosis in the left or right lower extremity veins.      Electronically signed by: Sravan Olivia MD  Date:    10/21/2024  Time:    19:48               Narrative:    EXAMINATION:  US LOWER EXTREMITY VEINS BILATERAL    CLINICAL HISTORY:  Localized edema    TECHNIQUE:  Duplex and color flow Doppler and dynamic compression was performed of the bilateral lower extremity veins was performed.    COMPARISON:  None    FINDINGS:  Right thigh veins: The common  femoral, femoral, popliteal, upper greater saphenous, and deep femoral veins are patent and free of thrombus. The veins are normally compressible and have normal phasic flow and augmentation response.    Right calf veins: The visualized calf veins are patent.    Left thigh veins: The common femoral, femoral, popliteal, upper greater saphenous, and deep femoral veins are patent and free of thrombus. The veins are normally compressible and have normal phasic flow and augmentation response.    Left calf veins: The visualized calf veins are patent.    Miscellaneous: None                                       Medications   labetaloL injection 20 mg (0 mg Intravenous Hold 10/21/24 1800)   butalbital-acetaminophen-caffeine -40 mg per tablet 1 tablet (has no administration in time range)   magnesium oxide tablet 400 mg (has no administration in time range)   labetaloL tablet 100 mg (100 mg Oral Given 10/21/24 1752)   acetaminophen tablet 650 mg (650 mg Oral Given 10/21/24 1752)   prochlorperazine injection Soln 10 mg (10 mg Intravenous Given 10/21/24 2100)   diphenhydrAMINE injection 12.5 mg (12.5 mg Intravenous Given 10/21/24 2103)     Medical Decision Making             ED Course as of 10/22/24 0027   Mon Oct 21, 2024   1817 Discussed case with Dr. Roberson, OB on call.  Patient is now 136/87 with just her home dose oral labetalol prior to any IV medications.  As long as her workup is unremarkable and blood pressure stays stable in ED, Dr. Roberson feels she is stable for outpatient follow-up with her provider this week. [JA]   Tue Oct 22, 2024   0026 Updated Dr. Roberson with blood pressure readings and persistent headache after Tylenol, IV Compazine, IV Benadryl.  Will admit for observation.  No need for magnesium infusion at this time.  Will additionally give oral Fioricet and oral magnesium for headache now. [JA]      ED Course User Index  [JA] Katarzyna Strauss MD             Patient presents to ED as above.  Initial BP  176/116.  Once placed in room, repeat blood pressure prior to intervention was 156/89.  Differential includes not limited to gestational hypertension, postpartum preeclampsia, migraine versus hypertensive headache, less likely ICH.  Per chart review, had negative MRI of brain while inpatient.  Patient was given oral Tylenol, oral labetalol 100 mg tablet.  Her blood pressure improved to 130s over 80s.  Headache persisted.  In light of this, she was given IV Compazine 10 mg and IV Benadryl 12.5 mg x 1.  All labs reviewed and significant for normal WBC count, stable hemoglobin of 9, stable renal function and liver function, , negative troponin, normal BNP.  1+ urine protein.     Chest x-ray independently reviewed by me and shows no acute cardiopulmonary process per radiology read.  Bilateral DVT study read as negative by radiology.  On reexam, patient's blood pressure has been persistently under severe range.  She still has a persistent headache despite therapeutics here.  Updated Dr. Roberson who recommends admit to Dr. Spain for observation.  No IV magnesium at this time.  We will give oral Fioricet and oral magnesium now.  Breast pump was provided to bedside.      Clinical Impression:  Final diagnoses:  [R06.02] SOB (shortness of breath)  [R60.0] Lower extremity edema  [I10] HTN (hypertension)  [R51.9] Intractable headache, unspecified chronicity pattern, unspecified headache type (Primary)          ED Disposition Condition    Send to L&D            CRITICAL CARE:    The high probability of sudden, clinically significant deterioration in the patient's condition required the  highest level of my preparedness to intervene urgently.    The services I provided to this patient were to treat and/or prevent clinically significant deterioration  that could result in severe disability or death. Services included some or all of the following: chart data  review, reviewing nursing notes and/or old charts, documentation time,  consultant collaboration  regarding findings and treatment options, medication orders and management, direct patient care, re-  evaluations, vital sign assessments and ordering, interpreting and reviewing diagnostic studies/lab tests.    Aggregate critical care time was 60 minutes which includes only time during which I was engaged in  work directly related to the patient's care, as described above, whether at the bedside or elsewhere in  the Emergency Department. It did not include time spent performing other reported procedures or the  services of residents, students, nurses or midlevel providers.       Katarzyna Strauss MD  10/22/24 0032

## 2024-10-21 NOTE — ED NOTES
Patient states she was d/c from hospital yesterday after having a baby. Patient states she has had a HA since d/c. Patient took 800 mg IBU x2, last dose around 1300, no relief of HA. Patient states she has a hx of pre eclampsia with previous birth.

## 2024-10-22 PROBLEM — R51.9 INTRACTABLE HEADACHE: Status: ACTIVE | Noted: 2024-10-22

## 2024-10-22 PROBLEM — E66.01 MORBID OBESITY WITH BMI OF 45.0-49.9, ADULT: Status: ACTIVE | Noted: 2017-02-13

## 2024-10-22 PROBLEM — G44.52 NEW DAILY PERSISTENT HEADACHE: Status: ACTIVE | Noted: 2024-10-22

## 2024-10-22 PROBLEM — I10 PRIMARY HYPERTENSION: Status: ACTIVE | Noted: 2024-10-22

## 2024-10-22 PROCEDURE — 25000003 PHARM REV CODE 250: Performed by: INTERNAL MEDICINE

## 2024-10-22 PROCEDURE — 25000003 PHARM REV CODE 250: Performed by: SPECIALIST

## 2024-10-22 PROCEDURE — 63600175 PHARM REV CODE 636 W HCPCS: Performed by: INTERNAL MEDICINE

## 2024-10-22 PROCEDURE — 25000003 PHARM REV CODE 250: Performed by: EMERGENCY MEDICINE

## 2024-10-22 PROCEDURE — 12000002 HC ACUTE/MED SURGE SEMI-PRIVATE ROOM

## 2024-10-22 PROCEDURE — 25000003 PHARM REV CODE 250: Performed by: STUDENT IN AN ORGANIZED HEALTH CARE EDUCATION/TRAINING PROGRAM

## 2024-10-22 PROCEDURE — 99222 1ST HOSP IP/OBS MODERATE 55: CPT | Mod: ,,, | Performed by: STUDENT IN AN ORGANIZED HEALTH CARE EDUCATION/TRAINING PROGRAM

## 2024-10-22 RX ORDER — LABETALOL HYDROCHLORIDE 5 MG/ML
40 INJECTION, SOLUTION INTRAVENOUS ONCE AS NEEDED
Status: DISCONTINUED | OUTPATIENT
Start: 2024-10-22 | End: 2024-10-23 | Stop reason: HOSPADM

## 2024-10-22 RX ORDER — LANOLIN ALCOHOL/MO/W.PET/CERES
400 CREAM (GRAM) TOPICAL ONCE
Status: COMPLETED | OUTPATIENT
Start: 2024-10-22 | End: 2024-10-22

## 2024-10-22 RX ORDER — LABETALOL 100 MG/1
100 TABLET, FILM COATED ORAL EVERY 12 HOURS
Status: DISCONTINUED | OUTPATIENT
Start: 2024-10-22 | End: 2024-10-22

## 2024-10-22 RX ORDER — LABETALOL HYDROCHLORIDE 5 MG/ML
20 INJECTION, SOLUTION INTRAVENOUS ONCE AS NEEDED
Status: DISCONTINUED | OUTPATIENT
Start: 2024-10-22 | End: 2024-10-23 | Stop reason: HOSPADM

## 2024-10-22 RX ORDER — HYDRALAZINE HYDROCHLORIDE 20 MG/ML
10 INJECTION INTRAMUSCULAR; INTRAVENOUS ONCE AS NEEDED
Status: DISCONTINUED | OUTPATIENT
Start: 2024-10-22 | End: 2024-10-23 | Stop reason: HOSPADM

## 2024-10-22 RX ORDER — NIFEDIPINE 30 MG/1
30 TABLET, EXTENDED RELEASE ORAL DAILY
Status: DISCONTINUED | OUTPATIENT
Start: 2024-10-22 | End: 2024-10-23

## 2024-10-22 RX ORDER — BUTALBITAL, ACETAMINOPHEN AND CAFFEINE 50; 325; 40 MG/1; MG/1; MG/1
1 TABLET ORAL ONCE
Status: COMPLETED | OUTPATIENT
Start: 2024-10-22 | End: 2024-10-22

## 2024-10-22 RX ORDER — MAGNESIUM SULFATE HEPTAHYDRATE 40 MG/ML
2 INJECTION, SOLUTION INTRAVENOUS ONCE
Status: COMPLETED | OUTPATIENT
Start: 2024-10-22 | End: 2024-10-22

## 2024-10-22 RX ORDER — HYDRALAZINE HYDROCHLORIDE 20 MG/ML
5 INJECTION INTRAMUSCULAR; INTRAVENOUS ONCE AS NEEDED
Status: DISCONTINUED | OUTPATIENT
Start: 2024-10-22 | End: 2024-10-23 | Stop reason: HOSPADM

## 2024-10-22 RX ORDER — HYDROCODONE BITARTRATE AND ACETAMINOPHEN 5; 325 MG/1; MG/1
1 TABLET ORAL EVERY 4 HOURS PRN
Status: DISCONTINUED | OUTPATIENT
Start: 2024-10-22 | End: 2024-10-23 | Stop reason: HOSPADM

## 2024-10-22 RX ORDER — LABETALOL HYDROCHLORIDE 5 MG/ML
80 INJECTION, SOLUTION INTRAVENOUS ONCE AS NEEDED
Status: DISCONTINUED | OUTPATIENT
Start: 2024-10-22 | End: 2024-10-23 | Stop reason: HOSPADM

## 2024-10-22 RX ORDER — LABETALOL 100 MG/1
100 TABLET, FILM COATED ORAL ONCE
Status: COMPLETED | OUTPATIENT
Start: 2024-10-22 | End: 2024-10-22

## 2024-10-22 RX ORDER — LABETALOL 200 MG/1
200 TABLET, FILM COATED ORAL EVERY 12 HOURS
Status: DISCONTINUED | OUTPATIENT
Start: 2024-10-22 | End: 2024-10-22

## 2024-10-22 RX ORDER — SODIUM CHLORIDE 9 MG/ML
INJECTION, SOLUTION INTRAVENOUS CONTINUOUS
Status: DISCONTINUED | OUTPATIENT
Start: 2024-10-22 | End: 2024-10-23 | Stop reason: HOSPADM

## 2024-10-22 RX ORDER — LABETALOL 200 MG/1
200 TABLET, FILM COATED ORAL EVERY 12 HOURS
Status: DISCONTINUED | OUTPATIENT
Start: 2024-10-22 | End: 2024-10-23 | Stop reason: HOSPADM

## 2024-10-22 RX ADMIN — LABETALOL HYDROCHLORIDE 200 MG: 200 TABLET, FILM COATED ORAL at 10:10

## 2024-10-22 RX ADMIN — HYDROCODONE BITARTRATE AND ACETAMINOPHEN 1 TABLET: 5; 325 TABLET ORAL at 01:10

## 2024-10-22 RX ADMIN — Medication 400 MG: at 12:10

## 2024-10-22 RX ADMIN — MAGNESIUM SULFATE HEPTAHYDRATE 2 G: 40 INJECTION, SOLUTION INTRAVENOUS at 02:10

## 2024-10-22 RX ADMIN — BUTALBITAL, ACETAMINOPHEN AND CAFFEINE 1 TABLET: 325; 50; 40 TABLET ORAL at 12:10

## 2024-10-22 RX ADMIN — LABETALOL HYDROCHLORIDE 100 MG: 100 TABLET, FILM COATED ORAL at 10:10

## 2024-10-22 RX ADMIN — HYDROCODONE BITARTRATE AND ACETAMINOPHEN 1 TABLET: 5; 325 TABLET ORAL at 07:10

## 2024-10-22 RX ADMIN — LABETALOL HYDROCHLORIDE 100 MG: 100 TABLET, FILM COATED ORAL at 02:10

## 2024-10-22 RX ADMIN — NIFEDIPINE 30 MG: 30 TABLET, FILM COATED, EXTENDED RELEASE ORAL at 03:10

## 2024-10-22 RX ADMIN — SODIUM CHLORIDE: 9 INJECTION, SOLUTION INTRAVENOUS at 02:10

## 2024-10-22 NOTE — PLAN OF CARE
[facility]  Discharge Assessment    Primary Care Provider: June Zaragoza MD       Pt is a 24-year-old female/male who arrived from home with Gestational hypertension, third trimester  problem. Information verified as correct on facesheet. The assessment was completed at the patient's bedside.  Pt lives with in-laws. Pt does not have a Living Will or Advance Directive. The Pt is oriented to person, places, and times. Pt is not linked to PCP currently seeing OBGYN for treatment..  Pt denies Coumadin, dialysis, DME, HH. Pt has been readmitted into the hospital in the last thirty days.  Pt is capable of performing ADLs without assistance. Pt's in-laws drives her to and from medical appointments. Pt reports she takes medication as prescribed; pharmacy used [facility].  Pt verbalized plan to discharge home via family transport. CM submitted referrals to Crisis pregnancy help center, [facility], and SSI benefits for . Pt has no other needs to be addressed at this time. CM reviewed the chart and will continue to monitor.     Discharge Assessment (most recent)       BRIEF DISCHARGE ASSESSMENT - 10/22/24 1051          Discharge Planning    Assessment Type Discharge Planning Reassessment     Resource/Environmental Concerns none     Support Systems Spouse/significant other;Other (Comment)     Assistance Needed Crib for infant and carseat     Equipment Currently Used at Home none     Current Living Arrangements home     Patient/Family Anticipates Transition to home;home with family     Patient/Family Anticipated Services at Transition community agency     DME Needed Upon Discharge  none     Discharge Plan A Home with family     Discharge Plan B Home        Financial Resource Strain    How hard is it for you to pay for the very basics like food, housing, medical care, and heating? Not hard at all   PT lives with in-laws       Housing Stability    In the last 12 months, was there a time when  you were not able to pay the mortgage or rent on time? No     At any time in the past 12 months, were you homeless or living in a shelter (including now)? No        Transportation Needs    Has the lack of transportation kept you from medical appointments, meetings, work or from getting things needed for daily living? No        Food Insecurity    Within the past 12 months, you worried that your food would run out before you got the money to buy more. Sometimes true     Within the past 12 months, the food you bought just didn't last and you didn't have money to get more. Sometimes true        Stress    Do you feel stress - tense, restless, nervous, or anxious, or unable to sleep at night because your mind is troubled all the time - these days? To some extent        Social Isolation    How often do you feel lonely or isolated from those around you?  Never        Alcohol Use    Q1: How often do you have a drink containing alcohol? Never     Q2: How many drinks containing alcohol do you have on a typical day when you are drinking? Patient does not drink     Q3: How often do you have six or more drinks on one occasion? Never        Site Organicities    In the past 12 months has the electric, gas, oil, or water company threatened to shut off services in your home? No        Health Literacy    How often do you need to have someone help you when you read instructions, pamphlets, or other written material from your doctor or pharmacy? Never

## 2024-10-22 NOTE — PLAN OF CARE
10/22/24 1137   Discharge Reassessment   Assessment Type Discharge Planning Reassessment   Did the patient's condition or plan change since previous assessment? Yes   Discharge Plan discussed with: Patient   Communicated GALILEA with patient/caregiver Date not available/Unable to determine   Discharge Plan A Home with family   Discharge Plan B Home   DME Needed Upon Discharge  none   Transition of Care Barriers None   Post-Acute Status   Discharge Delays None known at this time     Pt is a 24-year-old female/male who arrived from home with Gestational hypertension, third trimester  problem. Information verified as correct on facesheet. The assessment was completed at the patient's bedside.  Pt lives with in-laws. Pt does not have a Living Will or Advance Directive. The Pt is oriented to person, places, and times. Pt is not linked to PCP currently seeing OBGYN for treatment..  Pt denies Coumadin, dialysis, DME, HH. Pt has been readmitted into the hospital in the last thirty days.  Pt is capable of performing ADLs without assistance. Pt's in-laws drives her to and from medical appointments. Pt reports she takes medication as prescribed; pharmacy used [facility].  Pt verbalized plan to discharge home via family transport. CM submitted referrals to [facility], [facility], and SSI benefits for . Pt has no other needs to be addressed at this time. CM reviewed the chart and will continue to monitor.

## 2024-10-22 NOTE — ASSESSMENT & PLAN NOTE
Most likely secondary to uncontrolled hypertension versus migraine.   MRI/ MRA/ MRV of brain negative for acute changes.   Neurologist following

## 2024-10-22 NOTE — HPI
Reason for consult:  Hypertension/ headache      This is a 34-year-old  female history of with hypertension, morbid obesity with a BMI 46.29 is admitted to labor and delivery for intractable headache and persistent hypertension.  Patient  had repeat  on 10/16/2024 secondary preE, patient was discharged with labetalol 100 mg b.i.d. however she never picked up this medication.  Now patient is presenting with bilateral frontal headache, not improved with OTC, associated with blurry vision.  No chest pain SOB, no leg edema.  Patient reported that she has long history of gestational hypertension/ primary hypertension from the last pregnancy, she was prescribed labetalol/ hydralazine prior however she stopped taking both medications prior to this pregnancy.  She uses marijuana occasionally.  She vaping occasionally.  Denies using other recreational drugs.  Denies drinking alcohol denies smoking cigarettes.  Patient also has history of migraine that occurred every menstruation improved with over-the-counter medications.   Patient has been evaluated by neurologist, MRI of brain/ MRV of brain/ MRA of the brain are negative for acute changes.  EKG:  Normal sinus rhythm no acute ST changes.  Patient has been placed on labetalol 200 mg b.i.d., Procardia 30 mg once a day.  BP improved.  Patient reported also improved.  Patient does not check her blood pressure at home routinely.

## 2024-10-22 NOTE — SUBJECTIVE & OBJECTIVE
Past Medical History:   Diagnosis Date    Hypertension        Past Surgical History:   Procedure Laterality Date     SECTION N/A 2023    Procedure:  SECTION;  Surgeon: Marya Spain MD;  Location: [facility] L&D;  Service: OB/GYN;  Laterality: N/A;     SECTION N/A 10/16/2024    Procedure:  SECTION;  Surgeon: Marya Spain MD;  Location: [facility] L&D;  Service: OB/GYN;  Laterality: N/A;       Review of patient's allergies indicates:  No Known Allergies    No current facility-administered medications on file prior to encounter.     Current Outpatient Medications on File Prior to Encounter   Medication Sig    labetaloL (NORMODYNE) 100 MG tablet Take 1 tablet (100 mg total) by mouth every 12 (twelve) hours.    prenatal 25/iron/folate 6/dha (PRENA1 ORAL) Take by mouth.    [DISCONTINUED] ibuprofen (ADVIL,MOTRIN) 600 MG tablet Take 1 tablet (600 mg total) by mouth every 6 (six) hours as needed.    [DISCONTINUED] oxyCODONE-acetaminophen (PERCOCET) 5-325 mg per tablet Take 1-2 tablets by mouth every 4 (four) hours as needed for Pain.     Family History       Problem Relation (Age of Onset)    Diabetes Maternal Grandmother, Paternal Grandmother    Hypertension Maternal Grandmother, Paternal Grandmother, Paternal Grandfather    No Known Problems Mother    Seizures Father          Tobacco Use    Smoking status: Never    Smokeless tobacco: Not on file   Substance and Sexual Activity    Alcohol use: No    Drug use: Not Currently    Sexual activity: Yes     Review of Systems   Neurological:  Positive for headaches.     Objective:     Vital Signs (Most Recent):  Temp: 98.5 °F (36.9 °C) (10/22/24 0123)  Pulse: 75 (10/22/24 1551)  Resp: 18 (10/22/24 0152)  BP: (!) 147/97 (10/22/24 1551)  SpO2: 100 % (10/22/24 1209) Vital Signs (24h Range):  Temp:  [98.5 °F (36.9 °C)] 98.5 °F (36.9 °C)  Pulse:  [54-98] 75  Resp:  [16-21] 18  SpO2:  [90 %-100 %] 100 %  BP: ()/() 147/97     Weight: 111.1 kg  (245 lb)  Body mass index is 46.29 kg/m².     Physical Exam  Vitals and nursing note reviewed.   Constitutional:       Appearance: She is well-developed.   HENT:      Head: Normocephalic and atraumatic.   Eyes:      General: No scleral icterus.        Right eye: No discharge.         Left eye: No discharge.   Neck:      Vascular: No JVD.   Cardiovascular:      Rate and Rhythm: Normal rate and regular rhythm.      Heart sounds: No murmur heard.     No friction rub. No gallop.   Pulmonary:      Effort: Pulmonary effort is normal.      Breath sounds: Normal breath sounds. No wheezing or rales.   Abdominal:      General: Bowel sounds are normal. There is no distension.      Palpations: Abdomen is soft.      Tenderness: There is no abdominal tenderness.   Musculoskeletal:         General: No tenderness.      Cervical back: Neck supple.   Lymphadenopathy:      Cervical: No cervical adenopathy.   Skin:     Findings: No erythema or rash.   Neurological:      Mental Status: She is alert and oriented to person, place, and time.      Cranial Nerves: No cranial nerve deficit.          Significant Labs: All pertinent labs within the past 24 hours have been reviewed.  CBC:   Recent Labs   Lab 10/21/24  1800   WBC 12.16   HGB 9.0*   HCT 30.0*   *     CMP:   Recent Labs   Lab 10/21/24  1800      K 3.7      CO2 24   GLU 91   BUN 10   CREATININE 0.7   CALCIUM 9.1   PROT 6.9   ALBUMIN 3.4*   BILITOT 0.2   ALKPHOS 89   AST 18   ALT 17   ANIONGAP 8       Significant Imaging: I have reviewed all pertinent imaging results/findings within the past 24 hours.  I have reviewed and interpreted all pertinent imaging results/findings within the past 24 hours.    Scheduled Meds:   labetalol  20 mg Intravenous Once    labetaloL  200 mg Oral Q12H    NIFEdipine  30 mg Oral Daily     Continuous Infusions:   0.9% NaCl   Intravenous Continuous 75 mL/hr at 10/22/24 1426 New Bag at 10/22/24 1426     PRN Meds:.  Current  Facility-Administered Medications:     hydrALAZINE, 10 mg, Intravenous, Once PRN    hydrALAZINE, 5 mg, Intravenous, Once PRN    HYDROcodone-acetaminophen, 1 tablet, Oral, Q4H PRN    labetalol, 20 mg, Intravenous, Once PRN    labetalol, 40 mg, Intravenous, Once PRN    labetalol, 80 mg, Intravenous, Once PRN    MRV Brain Without Contrast    Result Date: 10/22/2024  EXAMINATION: MRV BRAIN WITHOUT CONTRAST CLINICAL HISTORY: Dural venous sinus thrombosis suspected; COMPARISON: None FINDINGS: There is degradation of the examination by motion. There is appropriate flow related signal within the major dural venous sinuses with no evidence of dural venous sinus thrombosis.     Motion degradation. No significant abnormality identified. Electronically signed by: Nikia Anguiano Date:    10/22/2024 Time:    14:17    MRA Brain without contrast    Result Date: 10/22/2024  EXAMINATION: MRA BRAIN WITHOUT CONTRAST CLINICAL HISTORY: R/o RCVS; TECHNIQUE: Non-contrast 3-D time-of-flight intracranial MR angiography was performed through the [place] with MIP reformatting. COMPARISON: None FINDINGS: There is degradation by patient motion. Appropriate flow related signal is observed within the major intracranial arteries.  There are no findings of significant luminal narrowing or focal occlusion.  No aneurysm or vascular malformation is identified.     Motion degradation. No significant abnormality identified. Electronically signed by: Nikia Anguiano Date:    10/22/2024 Time:    14:10    MRI Brain Without Contrast    Result Date: 10/22/2024  EXAMINATION: MRI BRAIN WITHOUT CONTRAST CLINICAL HISTORY: Repeat for persistent headache. R/o PRES; TECHNIQUE: Multiplanar multisequence MR imaging of the brain was performed  IV contrast. COMPARISON: MR brain 10/15/2024. FINDINGS: Gray matter distinction is preserved throughout the brain parenchyma.  The ventricles are midline without mass effect. Prominent pituitary gland is unchanged from prior  exam.  No intra-axial hemorrhage or restricted diffusion.  No intra-axial fluid collection or mass.  Bone marrow signal of the calvarium is within normal limits.  Major vascular flow voids are within normal limits.  The orbits globes and optic nerves are grossly unremarkable.  Paranasal sinuses are unremarkable.     No acute intracranial abnormality observed. Electronically signed by: Silas De La Paz Date:    10/22/2024 Time:    13:44    X-Ray Chest 1 View    Result Date: 10/21/2024  EXAMINATION: XR CHEST 1 VIEW CLINICAL HISTORY: Shortness of breath TECHNIQUE: Single frontal view of the chest was performed. COMPARISON: 06/01/2009 FINDINGS: Cardiac silhouette is upper limits of normal for size.  Lungs are symmetrically expanded without evidence of confluent airspace consolidation.  No significant volume of pleural fluid or pneumothorax identified.  Osseous structures appear intact.     No convincing radiographic evidence of acute intrathoracic process on this single view. Electronically signed by: Adama Raman MD Date:    10/21/2024 Time:    20:49    US Lower Extremity Veins Bilateral    Result Date: 10/21/2024  EXAMINATION: US LOWER EXTREMITY VEINS BILATERAL CLINICAL HISTORY: Localized edema TECHNIQUE: Duplex and color flow Doppler and dynamic compression was performed of the bilateral lower extremity veins was performed. COMPARISON: None FINDINGS: Right thigh veins: The common femoral, femoral, popliteal, upper greater saphenous, and deep femoral veins are patent and free of thrombus. The veins are normally compressible and have normal phasic flow and augmentation response. Right calf veins: The visualized calf veins are patent. Left thigh veins: The common femoral, femoral, popliteal, upper greater saphenous, and deep femoral veins are patent and free of thrombus. The veins are normally compressible and have normal phasic flow and augmentation response. Left calf veins: The visualized calf veins are patent.  Miscellaneous: None     No evidence of acute deep venous thrombosis in the left or right lower extremity veins. Electronically signed by: Sravan Olivia MD Date:    10/21/2024 Time:    19:48    MRI Brain Without Contrast    Result Date: 10/15/2024  EXAMINATION: MRI BRAIN WITHOUT CONTRAST CLINICAL HISTORY: Headache, chronic, no new features; TECHNIQUE: Multiplanar multisequence MR imaging of the brain was performed without contrast. COMPARISON: Head CT 11/25/2023 FINDINGS: There is no abnormal restricted diffusion to suggest acute major vascular territory distribution infarct.  The ventricles are normal in size and configuration without evidence for hydrocephalus or midline shift. There are no abnormal areas of gradient susceptibility to suggest acute or remote parenchymal hemorrhage.  No extra-axial hemorrhage or fluid collections.  The craniocervical junction is within normal limits.  The pituitary gland is prominent in size with an upward convex margin, in close proximity to/abutting the optic chiasm.  This presumably reflects physiological enlargement of the pituitary in this reportedly pregnant patient.     1. Noncontrast MRI demonstrates no acute intracranial abnormality.  Specifically no evidence of major vascular territory distribution infarct. 2. Prominent/enlarged pituitary gland with upward convex margin which is in close proximity to/abutting the optic chiasm.  These findings presumably reflect physiological enlargement of the pituitary in this reported pregnant patient. Electronically signed by: Sue Raman MD Date:    10/15/2024 Time:    22:07  - pulls last radiology orders

## 2024-10-22 NOTE — CONSULTS
[facility] Medicine  Consult Note    Patient Name: Zoran Dubose  MRN: [id number]  Admission Date: 10/21/2024  Hospital Length of Stay: 0 days  Attending Physician: Codi Castillo MD  Primary Care Provider: June Zaragoza MD           Patient information was obtained from patient and ER records.     Consults  Subjective:     Principal Problem: New daily persistent headache    Chief Complaint:   Chief Complaint   Patient presents with    hypertention after pregnancy     Headache since discharge yesterday. Pt is hypertensive, sob. No cp        HPI: Reason for consult:  Hypertension/ headache      This is a 34-year-old  female history of with hypertension, morbid obesity with a BMI 46.29 is admitted to labor and delivery for intractable headache and persistent hypertension.  Patient  had repeat  on 10/16/2024 secondary preE, patient was discharged with labetalol 100 mg b.i.d. however she never picked up this medication.  Now patient is presenting with bilateral frontal headache, not improved with OTC, associated with blurry vision.  No chest pain SOB, no leg edema.  Patient reported that she has long history of gestational hypertension/ primary hypertension from the last pregnancy, she was prescribed labetalol/ hydralazine prior however she stopped taking both medications prior to this pregnancy.  She uses marijuana occasionally.  She vaping occasionally.  Denies using other recreational drugs.  Denies drinking alcohol denies smoking cigarettes.  Patient also has history of migraine that occurred every menstruation improved with over-the-counter medications.   Patient has been evaluated by neurologist, MRI of brain/ MRV of brain/ MRA of the brain are negative for acute changes.  EKG:  Normal sinus rhythm no acute ST changes.  Patient has been placed on labetalol 200 mg b.i.d., Procardia 30 mg once a day.  BP improved.  Patient reported also improved.  Patient  does not check her blood pressure at home routinely.     Past Medical History:   Diagnosis Date    Hypertension        Past Surgical History:   Procedure Laterality Date     SECTION N/A 2023    Procedure:  SECTION;  Surgeon: Marya Spain MD;  Location: [facility] L&D;  Service: OB/GYN;  Laterality: N/A;     SECTION N/A 10/16/2024    Procedure:  SECTION;  Surgeon: Marya Spain MD;  Location: [facility] L&D;  Service: OB/GYN;  Laterality: N/A;       Review of patient's allergies indicates:  No Known Allergies    No current facility-administered medications on file prior to encounter.     Current Outpatient Medications on File Prior to Encounter   Medication Sig    labetaloL (NORMODYNE) 100 MG tablet Take 1 tablet (100 mg total) by mouth every 12 (twelve) hours.    prenatal 25/iron/folate 6/dha (PRENA1 ORAL) Take by mouth.    [DISCONTINUED] ibuprofen (ADVIL,MOTRIN) 600 MG tablet Take 1 tablet (600 mg total) by mouth every 6 (six) hours as needed.    [DISCONTINUED] oxyCODONE-acetaminophen (PERCOCET) 5-325 mg per tablet Take 1-2 tablets by mouth every 4 (four) hours as needed for Pain.     Family History       Problem Relation (Age of Onset)    Diabetes Maternal Grandmother, Paternal Grandmother    Hypertension Maternal Grandmother, Paternal Grandmother, Paternal Grandfather    No Known Problems Mother    Seizures Father          Tobacco Use    Smoking status: Never    Smokeless tobacco: Not on file   Substance and Sexual Activity    Alcohol use: No    Drug use: Not Currently    Sexual activity: Yes     Review of Systems   Neurological:  Positive for headaches.     Objective:     Vital Signs (Most Recent):  Temp: 98.5 °F (36.9 °C) (10/22/24 0123)  Pulse: 75 (10/22/24 1551)  Resp: 18 (10/22/24 0152)  BP: (!) 147/97 (10/22/24 1551)  SpO2: 100 % (10/22/24 1209) Vital Signs (24h Range):  Temp:  [98.5 °F (36.9 °C)] 98.5 °F (36.9 °C)  Pulse:  [54-98] 75  Resp:  [16-21] 18  SpO2:  [90 %-100 %]  100 %  BP: ()/() 147/97     Weight: 111.1 kg (245 lb)  Body mass index is 46.29 kg/m².     Physical Exam  Vitals and nursing note reviewed.   Constitutional:       Appearance: She is well-developed.   HENT:      Head: Normocephalic and atraumatic.   Eyes:      General: No scleral icterus.        Right eye: No discharge.         Left eye: No discharge.   Neck:      Vascular: No JVD.   Cardiovascular:      Rate and Rhythm: Normal rate and regular rhythm.      Heart sounds: No murmur heard.     No friction rub. No gallop.   Pulmonary:      Effort: Pulmonary effort is normal.      Breath sounds: Normal breath sounds. No wheezing or rales.   Abdominal:      General: Bowel sounds are normal. There is no distension.      Palpations: Abdomen is soft.      Tenderness: There is no abdominal tenderness.   Musculoskeletal:         General: No tenderness.      Cervical back: Neck supple.   Lymphadenopathy:      Cervical: No cervical adenopathy.   Skin:     Findings: No erythema or rash.   Neurological:      Mental Status: She is alert and oriented to person, place, and time.      Cranial Nerves: No cranial nerve deficit.          Significant Labs: All pertinent labs within the past 24 hours have been reviewed.  CBC:   Recent Labs   Lab 10/21/24  1800   WBC 12.16   HGB 9.0*   HCT 30.0*   *     CMP:   Recent Labs   Lab 10/21/24  1800      K 3.7      CO2 24   GLU 91   BUN 10   CREATININE 0.7   CALCIUM 9.1   PROT 6.9   ALBUMIN 3.4*   BILITOT 0.2   ALKPHOS 89   AST 18   ALT 17   ANIONGAP 8       Significant Imaging: I have reviewed all pertinent imaging results/findings within the past 24 hours.  I have reviewed and interpreted all pertinent imaging results/findings within the past 24 hours.    Scheduled Meds:   labetalol  20 mg Intravenous Once    labetaloL  200 mg Oral Q12H    NIFEdipine  30 mg Oral Daily     Continuous Infusions:   0.9% NaCl   Intravenous Continuous 75 mL/hr at 10/22/24 1426 New Bag  at 10/22/24 1426     PRN Meds:.  Current Facility-Administered Medications:     hydrALAZINE, 10 mg, Intravenous, Once PRN    hydrALAZINE, 5 mg, Intravenous, Once PRN    HYDROcodone-acetaminophen, 1 tablet, Oral, Q4H PRN    labetalol, 20 mg, Intravenous, Once PRN    labetalol, 40 mg, Intravenous, Once PRN    labetalol, 80 mg, Intravenous, Once PRN    MRV Brain Without Contrast    Result Date: 10/22/2024  EXAMINATION: MRV BRAIN WITHOUT CONTRAST CLINICAL HISTORY: Dural venous sinus thrombosis suspected; COMPARISON: None FINDINGS: There is degradation of the examination by motion. There is appropriate flow related signal within the major dural venous sinuses with no evidence of dural venous sinus thrombosis.     Motion degradation. No significant abnormality identified. Electronically signed by: Nikia Anguiano Date:    10/22/2024 Time:    14:17    MRA Brain without contrast    Result Date: 10/22/2024  EXAMINATION: MRA BRAIN WITHOUT CONTRAST CLINICAL HISTORY: R/o RCVS; TECHNIQUE: Non-contrast 3-D time-of-flight intracranial MR angiography was performed through the [employer] [place] with MIP reformatting. COMPARISON: None FINDINGS: There is degradation by patient motion. Appropriate flow related signal is observed within the major intracranial arteries.  There are no findings of significant luminal narrowing or focal occlusion.  No aneurysm or vascular malformation is identified.     Motion degradation. No significant abnormality identified. Electronically signed by: Nikia Anguiano Date:    10/22/2024 Time:    14:10    MRI Brain Without Contrast    Result Date: 10/22/2024  EXAMINATION: MRI BRAIN WITHOUT CONTRAST CLINICAL HISTORY: Repeat for persistent headache. R/o PRES; TECHNIQUE: Multiplanar multisequence MR imaging of the brain was performed  IV contrast. COMPARISON: MR brain 10/15/2024. FINDINGS: Gray matter distinction is preserved throughout the brain parenchyma.  The ventricles are midline without mass effect. Prominent  pituitary gland is unchanged from prior exam.  No intra-axial hemorrhage or restricted diffusion.  No intra-axial fluid collection or mass.  Bone marrow signal of the calvarium is within normal limits.  Major vascular flow voids are within normal limits.  The orbits globes and optic nerves are grossly unremarkable.  Paranasal sinuses are unremarkable.     No acute intracranial abnormality observed. Electronically signed by: Silas De La Paz Date:    10/22/2024 Time:    13:44    X-Ray Chest 1 View    Result Date: 10/21/2024  EXAMINATION: XR CHEST 1 VIEW CLINICAL HISTORY: Shortness of breath TECHNIQUE: Single frontal view of the chest was performed. COMPARISON: 06/01/2009 FINDINGS: Cardiac silhouette is upper limits of normal for size.  Lungs are symmetrically expanded without evidence of confluent airspace consolidation.  No significant volume of pleural fluid or pneumothorax identified.  Osseous structures appear intact.     No convincing radiographic evidence of acute intrathoracic process on this single view. Electronically signed by: Adama Raman MD Date:    10/21/2024 Time:    20:49    US Lower Extremity Veins Bilateral    Result Date: 10/21/2024  EXAMINATION: US LOWER EXTREMITY VEINS BILATERAL CLINICAL HISTORY: Localized edema TECHNIQUE: Duplex and color flow Doppler and dynamic compression was performed of the bilateral lower extremity veins was performed. COMPARISON: None FINDINGS: Right thigh veins: The common femoral, femoral, popliteal, upper greater saphenous, and deep femoral veins are patent and free of thrombus. The veins are normally compressible and have normal phasic flow and augmentation response. Right calf veins: The visualized calf veins are patent. Left thigh veins: The common femoral, femoral, popliteal, upper greater saphenous, and deep femoral veins are patent and free of thrombus. The veins are normally compressible and have normal phasic flow and augmentation response. Left calf veins:  The visualized calf veins are patent. Miscellaneous: None     No evidence of acute deep venous thrombosis in the left or right lower extremity veins. Electronically signed by: Sravan Olivia MD Date:    10/21/2024 Time:    19:48    MRI Brain Without Contrast    Result Date: 10/15/2024  EXAMINATION: MRI BRAIN WITHOUT CONTRAST CLINICAL HISTORY: Headache, chronic, no new features; TECHNIQUE: Multiplanar multisequence MR imaging of the brain was performed without contrast. COMPARISON: Head CT 11/25/2023 FINDINGS: There is no abnormal restricted diffusion to suggest acute major vascular territory distribution infarct.  The ventricles are normal in size and configuration without evidence for hydrocephalus or midline shift. There are no abnormal areas of gradient susceptibility to suggest acute or remote parenchymal hemorrhage.  No extra-axial hemorrhage or fluid collections.  The craniocervical junction is within normal limits.  The pituitary gland is prominent in size with an upward convex margin, in close proximity to/abutting the optic chiasm.  This presumably reflects physiological enlargement of the pituitary in this reportedly pregnant patient.     1. Noncontrast MRI demonstrates no acute intracranial abnormality.  Specifically no evidence of major vascular territory distribution infarct. 2. Prominent/enlarged pituitary gland with upward convex margin which is in close proximity to/abutting the optic chiasm.  These findings presumably reflect physiological enlargement of the pituitary in this reported pregnant patient. Electronically signed by: Sue Raman MD Date:    10/15/2024 Time:    22:07  - pulls last radiology orders    Assessment/Plan:     Primary hypertension  Patients blood pressure range in the last 24 hours was: BP  Min: 92/51  Max: 180/105.The patient's inpatient anti-hypertensive regimen is listed below:  Current Antihypertensives  labetaloL injection 20 mg, Once, Intravenous  hydrALAZINE injection 5  mg, Once as needed, Intravenous  hydrALAZINE injection 10 mg, Once as needed, Intravenous  labetaloL injection 20 mg, Once as needed, Intravenous  labetaloL injection 40 mg, Once as needed, Intravenous  labetaloL injection 80 mg, Once as needed, Intravenous  labetaloL tablet 200 mg, Every 12 hours, Oral  NIFEdipine 24 hr tablet 30 mg, Daily, Oral    Plan  - BP is controlled, no changes needed to their regimen      Most likely patient has primary hypertension on top of gestional hypertension.   Patient needs to be on maintain antihypertensive medication after delivery.   Continue labetalol and Procardia at the current dosage.     Get renal artery sonogram, get 2D echo.   Patient will need further secondary  hypertension workup outpatient.   We will continue to follow up.     Intractable headache    Most likely secondary to uncontrolled hypertension versus migraine.   MRI/ MRA/ MRV of brain negative for acute changes.   Neurologist following    Gestational hypertension, third trimester    As above      VTE Risk Mitigation (From admission, onward)      None                Thank you for your consult. I will follow-up with patient. Please contact us if you have any additional questions.    Codi Castillo MD  Department of Hospital Medicine   [facility]

## 2024-10-22 NOTE — PLAN OF CARE
10/22/24 1058   Discharge Planning   Assessment Type Discharge Planning Reassessment   Resource/Environmental Concerns none   Support Systems Spouse/significant other;Other (Comment)   Assistance Needed Crib for infant and carseat   Equipment Currently Used at Home none   Current Living Arrangements home   Patient/Family Anticipates Transition to home;home with family   Patient/Family Anticipated Services at Transition community agency   DME Needed Upon Discharge  none   Discharge Plan A Home with family   Discharge Plan B Home   Financial Resource Strain   How hard is it for you to pay for the very basics like food, housing, medical care, and heating? Not hard  (PT lives with in-laws)   Housing Stability   In the last 12 months, was there a time when you were not able to pay the mortgage or rent on time? N   At any time in the past 12 months, were you homeless or living in a shelter (including now)? N   Transportation Needs   Has the lack of transportation kept you from medical appointments, meetings, work or from getting things needed for daily living? No   Food Insecurity   Within the past 12 months, you worried that your food would run out before you got the money to buy more. Sometimes   Within the past 12 months, the food you bought just didn't last and you didn't have money to get more. Sometimes   Stress   Do you feel stress - tense, restless, nervous, or anxious, or unable to sleep at night because your mind is troubled all the time - these days? To some exte   Social Isolation   How often do you feel lonely or isolated from those around you?  Never   Alcohol Use   Q1: How often do you have a drink containing alcohol? Never   Q2: How many drinks containing alcohol do you have on a typical day when you are drinking? None   Q3: How often do you have six or more drinks on one occasion? Never   [employer]   In the past 12 months has the electric, gas, oil, or water company threatened to shut off services in  your home? No   Health Literacy   How often do you need to have someone help you when you read instructions, pamphlets, or other written material from your doctor or pharmacy? Never

## 2024-10-22 NOTE — ASSESSMENT & PLAN NOTE
Patients blood pressure range in the last 24 hours was: BP  Min: 92/51  Max: 180/105.The patient's inpatient anti-hypertensive regimen is listed below:  Current Antihypertensives  labetaloL injection 20 mg, Once, Intravenous  hydrALAZINE injection 5 mg, Once as needed, Intravenous  hydrALAZINE injection 10 mg, Once as needed, Intravenous  labetaloL injection 20 mg, Once as needed, Intravenous  labetaloL injection 40 mg, Once as needed, Intravenous  labetaloL injection 80 mg, Once as needed, Intravenous  labetaloL tablet 200 mg, Every 12 hours, Oral  NIFEdipine 24 hr tablet 30 mg, Daily, Oral    Plan  - BP is controlled, no changes needed to their regimen      Most likely patient has primary hypertension on top of gestional hypertension.   Patient needs to be on maintain antihypertensive medication after delivery.   Continue labetalol and Procardia at the current dosage.     Get renal artery sonogram, get 2D echo.   Patient will need further secondary  hypertension workup outpatient.   We will continue to follow up.

## 2024-10-22 NOTE — DISCHARGE SUMMARY
[facility]  Obstetrics & Gynecology  Progress Note    Patient Name: Zoran Dubose  MRN: [id number]  Admission Date: 10/21/2024  Attending Physician: Marya Spain MD   Discharging Provider: Marya Spain MD  Primary Care Provider: June Zaragoza MD    HPI:  No notes on file    Hospital Course:  24-year-old  2 para 2 postop from  section secondary to severe preeclampsia.  Patient was admitted to the hospital after being discharged for elevated blood pressures and headaches.  This has been a continuing problems since her admission prior to the  section.  Her blood pressures are quite labile.    Goals of Care Treatment Preferences:  Code Status: Full Code      Physical exam patient says she is feeling much better today she has no more headache   Blood pressure currently is 130/75.  However on admit it was as high as 170/117   No other abnormal physical exam findings   Extremities no edema     Postpartum hypertension with continued headache.  Agree with neurology consult.  Will let her go home with Fioricet if neurologist agrees.  I am going to continue her on 100 mg twice a day of labetalol.  She did not take her labetalol at home because she said the pharmacy did not have it available for her.  The labetalol is ready for her at the pharmacy.  Once patient is evaluated from neurologist in any extra recommendations we will let her go home.    Patient continued again to start having elevated blood pressures during the day.  Hospitalist consulted appreciate help.  Patient will be continued on labetalol 200 mg twice a day and added Procardia    Consults (From admission, onward)          Status Ordering Provider     Inpatient consult to Neurology  Once        Provider:  Kashif Thornton MD    Acknowledged CHUYITA MONCADA     Inpatient consult to Obstetrics / Gynecology  Once        Provider:  Chuyita Moncada MD    Acknowledged DANITA MONTGOMERY            Significant Diagnostic Studies:  Labs: CBC   Recent Labs   Lab 10/21/24  1800   WBC 12.16   HGB 9.0*   HCT 30.0*   *     Lab Results   Component Value Date    GROUPTRH A POS 10/16/2024         Pending Diagnostic Studies:       None          Final Active Diagnoses:    Diagnosis Date Noted POA    PRINCIPAL PROBLEM:  New daily persistent headache [G44.52] 10/22/2024 Yes    Intractable headache [R51.9] 10/22/2024 Unknown    Gestational hypertension, third trimester [O13.3] 2023 Yes      Problems Resolved During this Admission:        Discharged Condition: good    Disposition: Home or Self Care    Follow Up:    Patient Instructions:      Diet Adult Regular     No driving until:   Order Comments: 14 days for vaginal delivery  28 days for  Section     No dressing needed     Leave dressing on - Keep it clean, dry, and intact until clinic visit     Activity as tolerated   Order Comments: Pelvic rest x 6 weeks     Medications:  Reconciled Home Medications:      Medication List        ASK your doctor about these medications      ibuprofen 600 MG tablet  Commonly known as: ADVIL,MOTRIN  Take 1 tablet (600 mg total) by mouth every 6 (six) hours as needed.     labetaloL 100 MG tablet  Commonly known as: NORMODYNE  Take 1 tablet (100 mg total) by mouth every 12 (twelve) hours.     oxyCODONE-acetaminophen 5-325 mg per tablet  Commonly known as: PERCOCET  Take 1-2 tablets by mouth every 4 (four) hours as needed for Pain.     PRENA1 ORAL  Take by mouth.              Marya Spain MD  Obstetrics & Gynecology  [facility]

## 2024-10-22 NOTE — H&P
[facility]  Department of Obstetrics and Gynecology  H&P Note    PATIENT NAME: Zoran Dubose  MRN: [id number]  TODAY'S DATE: 10/22/2024  ADMIT DATE: 10/21/2024    SUBJECTIVE     PRINCIPLE PROBLEM: GHTN, Persistent headache    Subjective:   Chief Complaint:  hypertention after pregnancy (Headache since discharge yesterday. Pt is hypertensive, sob. No cp)      10/22/2024       Zoran Dubose is a  24 y.o. female s/p repeat c/s on 10.16.24 secondary preE with SF.  Patient had presented c/o persistent headaches with labile BPs.  Headaches were refractory to medications.  MRI unremarkable.  Patient was discharged on 10.20.24 with labetalol 100 mg BID.  She did not  her medications on discharge as it was not ready yet.      Patient reported persistent frontal headache, unrelieved with OTC meds.  Patient was given a dose of labetalol in the ER and BP has been mild range.  She was given fioricet, Mag ox, Tylenol, Compazine and Benadryl without relief of symptoms.  Patient denies any vision changes, RUQ pain, or worsening nondepending edema.    History  PMH: Class III obesity, GHTN  Psx: c/s x 2  All: NKDA  OB:   GYN: Denies any STIs or abnl Pap  FH: Denies any female/colon cancer or MI prior to 50yo  SH: Denies ALYSE  Meds:  Current Outpatient Medications   Medication Instructions    ibuprofen (ADVIL,MOTRIN) 600 mg, Oral, Every 6 hours PRN    labetaloL (NORMODYNE) 100 mg, Oral, Every 12 hours    oxyCODONE-acetaminophen (PERCOCET) 5-325 mg per tablet 1-2 tablets, Oral, Every 4 hours PRN    prenatal 25/iron/folate 6/dha (PRENA1 ORAL) Take by mouth.       Review of Systems  Review of Systems   Constitutional:  Negative for chills and fever.   Eyes:  Negative for visual disturbance.   Respiratory:  Negative for cough and shortness of breath.    Cardiovascular:  Negative for chest pain and palpitations.   Gastrointestinal:  Negative for abdominal pain, nausea and vomiting.   Genitourinary:  Negative  for vaginal discharge, vaginal pain and vaginal odor.   Neurological:  Positive for headaches. Negative for seizures and numbness.   Psychiatric/Behavioral:  Negative for depression and sleep disturbance. The patient is not nervous/anxious.    All other systems reviewed and are negative.  Breast: Negative for lump and mastodynia       Objective:        Vitals:    10/22/24 0122 10/22/24 0123 10/22/24 0137 10/22/24 0139   BP:  (!) 142/75  (!) 145/97   Pulse: 66 60 61 (!) 55   Resp:  18     Temp:  98.5 °F (36.9 °C)     TempSrc:  Oral     SpO2: 100% 100% 100%    Weight:       Height:           Physical Exam  Vitals reviewed.   Constitutional:       Appearance: Normal appearance.   HENT:      Head: Normocephalic and atraumatic.   Eyes:      Conjunctiva/sclera: Conjunctivae normal.   Cardiovascular:      Rate and Rhythm: Normal rate and regular rhythm.   Pulmonary:      Effort: Pulmonary effort is normal.      Breath sounds: Normal breath sounds. No wheezing.   Abdominal:      General: Abdomen is flat.      Palpations: Abdomen is soft.      Tenderness: There is no abdominal tenderness.   Musculoskeletal:         General: Normal range of motion.      Cervical back: Normal range of motion.   Skin:     General: Skin is warm and dry.   Neurological:      General: No focal deficit present.      Mental Status: She is alert and oriented to person, place, and time.   Psychiatric:         Mood and Affect: Mood normal.         Behavior: Behavior normal.         Thought Content: Thought content normal.         Judgment: Judgment normal.               Recent Laboratory Results:     WBC 12.16  H/H 9/30 (stable from 10.16.24)  AST/ALT 18/17  Alk Phos 89  Cr 0.7    PCR 1.02 (previous 0.31 from 10.11.24, prior to diagnosis of preE with SF)      Recent Imaging Results:   X-Ray Chest 1 View  Narrative: EXAMINATION:  XR CHEST 1 VIEW    CLINICAL HISTORY:  Shortness of breath    TECHNIQUE:  Single frontal view of the chest was  performed.    COMPARISON:  06/01/2009    FINDINGS:  Cardiac silhouette is upper limits of normal for size.  Lungs are symmetrically expanded without evidence of confluent airspace consolidation.  No significant volume of pleural fluid or pneumothorax identified.  Osseous structures appear intact.  Impression: No convincing radiographic evidence of acute intrathoracic process on this single view.    Electronically signed by: Adama Raman MD  Date:    10/21/2024  Time:    20:49  US Lower Extremity Veins Bilateral  Narrative: EXAMINATION:  US LOWER EXTREMITY VEINS BILATERAL    CLINICAL HISTORY:  Localized edema    TECHNIQUE:  Duplex and color flow Doppler and dynamic compression was performed of the bilateral lower extremity veins was performed.    COMPARISON:  None    FINDINGS:  Right thigh veins: The common femoral, femoral, popliteal, upper greater saphenous, and deep femoral veins are patent and free of thrombus. The veins are normally compressible and have normal phasic flow and augmentation response.    Right calf veins: The visualized calf veins are patent.    Left thigh veins: The common femoral, femoral, popliteal, upper greater saphenous, and deep femoral veins are patent and free of thrombus. The veins are normally compressible and have normal phasic flow and augmentation response.    Left calf veins: The visualized calf veins are patent.    Miscellaneous: None  Impression: No evidence of acute deep venous thrombosis in the left or right lower extremity veins.    Electronically signed by: Sravan Olivia MD  Date:    10/21/2024  Time:    19:48    MRI Brain Without Contrast 10/15/2024    Narrative  EXAMINATION:  MRI BRAIN WITHOUT CONTRAST    CLINICAL HISTORY:  Headache, chronic, no new features;    TECHNIQUE:  Multiplanar multisequence MR imaging of the brain was performed without contrast.    COMPARISON:  Head CT 11/25/2023    FINDINGS:  There is no abnormal restricted diffusion to suggest acute major  vascular territory distribution infarct.  The ventricles are normal in size and configuration without evidence for hydrocephalus or midline shift. There are no abnormal areas of gradient susceptibility to suggest acute or remote parenchymal hemorrhage.  No extra-axial hemorrhage or fluid collections.  The craniocervical junction is within normal limits.  The pituitary gland is prominent in size with an upward convex margin, in close proximity to/abutting the optic chiasm.  This presumably reflects physiological enlargement of the pituitary in this reportedly pregnant patient.    Impression  1. Noncontrast MRI demonstrates no acute intracranial abnormality.  Specifically no evidence of major vascular territory distribution infarct.  2. Prominent/enlarged pituitary gland with upward convex margin which is in close proximity to/abutting the optic chiasm.  These findings presumably reflect physiological enlargement of the pituitary in this reported pregnant patient.      Electronically signed by: Sue Raman MD  Date:    10/15/2024  Time:    22:07   Assessment:     1. Intractable headache, unspecified chronicity pattern, unspecified headache type    2. SOB (shortness of breath)    3. Lower extremity edema    4. HTN (hypertension)        Active Hospital Problems    Diagnosis  POA    *New daily persistent headache [G44.52]  Yes    Gestational hypertension, third trimester [O13.3]  Yes      Resolved Hospital Problems   No resolved problems to display.           Plan:     Admit patient for observation  Headaches are not consistent with PRES that is associated with preE  Neurology consult in AM  Will treat with pain control at this time with norco  Increase labetalol 200 mg BID for better BP control.  Severe range BP protocol in place.  Will start Mag for seizure prophylaxis if patient develops persistent severe range BP requiring IV control

## 2024-10-22 NOTE — CONSULTS
[facility]  Department of Neurology  Neurology Consultation Note        PATIENT NAME: Zoran uDbose  MRN: [id number]  CSN: [id number]      TODAY'S DATE: 10/22/2024  ADMIT DATE: 10/21/2024                            CONSULTING PROVIDER: Kashif Thornton MD  CONSULT REQUESTED BY: Marya Spain MD      Reason for consult:  Headache      History obtained from chart review and the patient.    HPI per EMR: Zoran Dubose is a 24 y.o. female with a history of  is a  24 y.o. female s/p repeat c/s on 10.16.24 secondary preE with SF.  Patient had presented c/o persistent headaches with labile BPs.  Headaches were refractory to medications.  MRI unremarkable.  Patient was discharged on 10.20.24 with labetalol 100 mg BID.  She did not  her medications on discharge as it was not ready yet.       Patient reported persistent frontal headache, unrelieved with OTC meds.  Patient was given a dose of labetalol in the ER and BP has been mild range.  She was given fioricet, Mag ox, Tylenol, Compazine and Benadryl without relief of symptoms.  Patient denies any vision changes, RUQ pain, or worsening nondepending edema.       Neurology consult:  Patient was seen and examined by me.  She recently underwent  on 10/16/2024 secondary to preeclampsia and delivered at 34 weeks.  She initially presented during that admission with persistent headache which was bifrontal, pounding sensation ranging from 7-10/10 intensity.  Her blood pressure was labile and she was diagnosed with preeclampsia and had a .  She states that headache has improved after and was discharged on 10/20/2024 with labetalol 100 mg b.i.d(however not picked up)..    After she went home, she had persistent frontal headache without relief from over-the-counter medications for which she presented back to the ER.  Her blood pressure on arrival was 170 systolic and received labetalol in the ER with improvement in blood pressure.  She was  given a dose of magnesium, Tylenol, Compazine and Benadryl without much improvement in her symptoms at that time.    She states that she has a history of migraine headaches and she has headaches about 3-4 days every month which are almost similar to this headache however this headache is more intense compared to her usual migraine headaches.  She only takes over-the-counter medications for her migraine headaches and is not on any scheduled medications.  She also has associated photophobia, phonophobia, nausea and vomiting with her migraine headaches and endorses similar symptoms with her recent headaches as well.  Denies any vision loss except for intermittent blurred vision, denies focal weakness or sensory changes, dizziness or lightheadedness.    Currently headache is about 3/10 in intensity.    PREVIOUS MEDICAL HISTORY:  Past Medical History:   Diagnosis Date    Hypertension      PREVIOUS SURGICAL HISTORY:  Past Surgical History:   Procedure Laterality Date     SECTION N/A 2023    Procedure:  SECTION;  Surgeon: Marya Spain MD;  Location: [facility] L&D;  Service: OB/GYN;  Laterality: N/A;     SECTION N/A 10/16/2024    Procedure:  SECTION;  Surgeon: Marya Spain MD;  Location: [facility] L&D;  Service: OB/GYN;  Laterality: N/A;     FAMILY MEDICAL HISTORY:  Family History   Problem Relation Name Age of Onset    No Known Problems Mother      Seizures Father      Hypertension Maternal Grandmother      Diabetes Maternal Grandmother      Hypertension Paternal Grandmother      Diabetes Paternal Grandmother      Hypertension Paternal Grandfather      Congenital heart disease Neg Hx      Arrhythmia Neg Hx      Pacemaker/defibrilator Neg Hx       ALLERGIES:  Review of patient's allergies indicates:  No Known Allergies  HOME MEDICATIONS:  Prior to Admission medications    Medication Sig Start Date End Date Taking? Authorizing Provider   ibuprofen (ADVIL,MOTRIN) 600 MG tablet Take 1 tablet  (600 mg total) by mouth every 6 (six) hours as needed. 10/20/24  Yes Xenia Vallejo MD   labetaloL (NORMODYNE) 100 MG tablet Take 1 tablet (100 mg total) by mouth every 12 (twelve) hours. 10/20/24 10/20/25 Yes Xenia Vallejo MD   prenatal 25/iron/folate 6/dha (PRENA1 ORAL) Take by mouth.   Yes Provider, Historical   oxyCODONE-acetaminophen (PERCOCET) 5-325 mg per tablet Take 1-2 tablets by mouth every 4 (four) hours as needed for Pain. 10/20/24   Xenia Vallejo MD     CURRENT SCHEDULED MEDICATIONS:   labetalol  20 mg Intravenous Once    labetaloL  200 mg Oral Q12H     CURRENT INFUSIONS:    CURRENT PRN MEDICATIONS:    Current Facility-Administered Medications:     hydrALAZINE, 10 mg, Intravenous, Once PRN    hydrALAZINE, 5 mg, Intravenous, Once PRN    HYDROcodone-acetaminophen, 1 tablet, Oral, Q4H PRN    labetalol, 20 mg, Intravenous, Once PRN    labetalol, 40 mg, Intravenous, Once PRN    labetalol, 80 mg, Intravenous, Once PRN    REVIEW OF SYSTEMS:  Please refer to the HPI for all pertinent positive and negative findings. A comprehensive review of all other systems was negative.    PHYSICAL EXAM:  Patient Vitals for the past 24 hrs:   BP Temp Temp src Pulse Resp SpO2 Height Weight   10/22/24 0909 -- -- -- 87 -- 100 % -- --   10/22/24 0904 -- -- -- 87 -- 100 % -- --   10/22/24 0859 -- -- -- 90 -- 99 % -- --   10/22/24 0856 136/75 -- -- 72 -- -- -- --   10/22/24 0854 -- -- -- 86 -- 100 % -- --   10/22/24 0849 -- -- -- 87 -- (!) 94 % -- --   10/22/24 0844 -- -- -- 90 -- 100 % -- --   10/22/24 0837 -- -- -- 93 -- 100 % -- --   10/22/24 0832 -- -- -- 86 -- 100 % -- --   10/22/24 0827 -- -- -- 75 -- 100 % -- --   10/22/24 0822 -- -- -- 96 -- 100 % -- --   10/22/24 0817 -- -- -- 79 -- 99 % -- --   10/22/24 0812 -- -- -- 83 -- 99 % -- --   10/22/24 0807 -- -- -- 78 -- 100 % -- --   10/22/24 0800 -- -- -- 75 -- 100 % -- --   10/22/24 0757 -- -- -- 78 -- 100 % -- --   10/22/24 0752 (!) 118/58 -- -- 76 -- 100 % -- --    10/22/24 0747 -- -- -- 80 -- 100 % -- --   10/22/24 0742 -- -- -- 82 -- 100 % -- --   10/22/24 0737 -- -- -- 75 -- 99 % -- --   10/22/24 0732 -- -- -- 82 -- 98 % -- --   10/22/24 0727 -- -- -- 72 -- 98 % -- --   10/22/24 0722 -- -- -- 76 -- 98 % -- --   10/22/24 0717 -- -- -- 76 -- 98 % -- --   10/22/24 0712 -- -- -- 77 -- 100 % -- --   10/22/24 0707 -- -- -- 71 -- 100 % -- --   10/22/24 0700 -- -- -- 74 -- 100 % -- --   10/22/24 0657 -- -- -- 74 -- 100 % -- --   10/22/24 0652 (!) 98/52 -- -- 73 -- 100 % -- --   10/22/24 0647 -- -- -- 71 -- 100 % -- --   10/22/24 0642 -- -- -- 82 -- 100 % -- --   10/22/24 0637 -- -- -- 76 -- 99 % -- --   10/22/24 0632 -- -- -- 78 -- 99 % -- --   10/22/24 0627 -- -- -- 80 -- 99 % -- --   10/22/24 0622 -- -- -- 75 -- 100 % -- --   10/22/24 0617 -- -- -- 77 -- 99 % -- --   10/22/24 0612 -- -- -- 76 -- 98 % -- --   10/22/24 0607 -- -- -- 83 -- 99 % -- --   10/22/24 0600 -- -- -- 78 -- 98 % -- --   10/22/24 0557 -- -- -- 79 -- 98 % -- --   10/22/24 0552 (!) 124/56 -- -- 93 -- 99 % -- --   10/22/24 0547 -- -- -- 77 -- 100 % -- --   10/22/24 0542 -- -- -- 77 -- 100 % -- --   10/22/24 0537 -- -- -- 85 -- 99 % -- --   10/22/24 0532 -- -- -- 79 -- 99 % -- --   10/22/24 0527 -- -- -- 72 -- 100 % -- --   10/22/24 0500 -- -- -- 74 -- 99 % -- --   10/22/24 0452 (!) 105/53 -- -- 68 -- 100 % -- --   10/22/24 0352 (!) 99/55 -- -- 76 -- 100 % -- --   10/22/24 0347 -- -- -- 73 -- 100 % -- --   10/22/24 0305 130/67 -- -- 85 -- -- -- --   10/22/24 0302 -- -- -- 98 -- 100 % -- --   10/22/24 0152 -- -- -- 69 18 100 % -- --   10/22/24 0139 (!) 145/97 -- -- (!) 55 -- -- -- --   10/22/24 0137 -- -- -- 61 -- 100 % -- --   10/22/24 0123 (!) 142/75 98.5 °F (36.9 °C) Oral 60 18 100 % -- --   10/22/24 0122 -- -- -- 66 -- 100 % -- --   10/22/24 0030 138/81 -- -- 72 -- 99 % -- --   10/22/24 0000 134/74 -- -- -- -- 100 % -- --   10/21/24 2330 (!) 151/75 -- -- -- -- 99 % -- --   10/21/24 2300 139/81 -- -- 64 (!) 21  "99 % -- --   10/21/24 2230 133/84 -- -- -- -- 98 % -- --   10/21/24 2200 139/77 -- -- 71 -- 98 % -- --   10/21/24 2130 (!) 145/82 -- -- 80 -- 98 % -- --   10/21/24 2100 -- -- -- 80 -- 99 % -- --   10/21/24 2030 (!) 128/102 -- -- 80 -- 100 % -- --   10/21/24 2000 132/81 -- -- 86 -- 99 % -- --   10/21/24 1930 (!) 152/86 -- -- -- -- 99 % -- --   10/21/24 1900 125/76 -- -- 81 20 99 % -- --   10/21/24 1841 -- -- -- 80 20 99 % -- --   10/21/24 1830 (!) 140/101 -- -- 85 -- -- -- --   10/21/24 1752 (!) 156/89 -- -- 89 -- -- -- --   10/21/24 1729 (!) 176/117 98.5 °F (36.9 °C) Oral 72 16 99 % 5' 1" (1.549 m) 111.1 kg (245 lb)       GENERAL APPEARANCE: Alert, well-developed, well-nourished female in no acute distress.  HEENT: Normocephalic and atraumatic. PERRL. Oropharynx unremarkable.  PULM: Normal respiratory effort. No accessory muscle use.  CV: RRR.  ABDOMEN: Soft, nontender.  EXTREMITIES: No obvious signs of vascular compromise. Pulses present. No cyanosis, clubbing or edema.  SKIN: Clear; no rashes, lesions or skin breaks in exposed areas.    NEURO:  MENTAL STATUS: Patient awake and oriented to time, place, and person, recent/remote memory normal, attention span/concentration normal, and speech fluent without paraphasic errors.  Affect euthymic.    CRANIAL NERVES:  CN I: Not tested.  CN II: Fundoscopic exam deferred.  CN III, IV, VI: Pupils equal, round and reactive to light.  Extraocular movements full and intact.  CN V: Facial sensation normal.  CN VII: Facial asymmetry absent.  CN VIII: Hearing grossly normal and equal bilaterally.  No skew deviation or pathologic nystagmus.  CN IX, X: Palate elevates symmetrically. Speech/articulation is clear without dysarthria.  CN XI: Shoulder shrug and chin rotation equal with good strength.  CN XII: Tongue protrusion midline.    MOTOR:  Bulk normal. Tone normal and symmetric throughout.  Abnormal movements absent.  Tremor: none present.  Strength 5/5 throughout except/unless " "specified below:.    REFLEXES:  DTRs 1+ throughout.  Plantar response equivocal bilaterally.  SENSATION: grossly intact throughout.  COORDINATION: normal finger-to-nose.  STATION: not tested.  GAIT: not tested.         Labs:  Recent Labs   Lab 10/16/24  0852 10/21/24  1800   * 137   K 3.6 3.7    105   CO2 21* 24   BUN 5* 10   CREATININE 0.6 0.7   * 91   CALCIUM 8.8 9.1   PHOS  --  4.0   MG  --  1.9     Recent Labs   Lab 10/16/24  0852 10/16/24  1926 10/21/24  1800   WBC 16.46* 20.14* 12.16   HGB 8.2* 9.0* 9.0*   HCT 27.6* 30.5* 30.0*   * 439 482*     Recent Labs   Lab 10/16/24  0852 10/21/24  1800   ALBUMIN 3.3* 3.4*   PROT 6.7 6.9   BILITOT 0.2 0.2   ALKPHOS 107 89   ALT 10 17   AST 15 18     No results found for: "PT", "PTT", "INR"  Lab Results   Component Value Date    TRIG 66 11/13/2017    TRIG 66 11/13/2017    HDL 70 11/13/2017    HDL 70 11/13/2017    CHOLHDL 39.8 11/13/2017    CHOLHDL 39.8 11/13/2017     Lab Results   Component Value Date    HGBA1C 5.3 11/13/2017     No results found for: "PROTEINCSF", "GLUCCSF", "WBCCSF"    Imaging:  I have reviewed and interpreted the pertinent imaging and lab results.      X-Ray Chest 1 View  Narrative: EXAMINATION:  XR CHEST 1 VIEW    CLINICAL HISTORY:  Shortness of breath    TECHNIQUE:  Single frontal view of the chest was performed.    COMPARISON:  06/01/2009    FINDINGS:  Cardiac silhouette is upper limits of normal for size.  Lungs are symmetrically expanded without evidence of confluent airspace consolidation.  No significant volume of pleural fluid or pneumothorax identified.  Osseous structures appear intact.  Impression: No convincing radiographic evidence of acute intrathoracic process on this single view.    Electronically signed by: Adama Raman MD  Date:    10/21/2024  Time:    20:49  US Lower Extremity Veins Bilateral  Narrative: EXAMINATION:  US LOWER EXTREMITY VEINS BILATERAL    CLINICAL HISTORY:  Localized " edema    TECHNIQUE:  Duplex and color flow Doppler and dynamic compression was performed of the bilateral lower extremity veins was performed.    COMPARISON:  None    FINDINGS:  Right thigh veins: The common femoral, femoral, popliteal, upper greater saphenous, and deep femoral veins are patent and free of thrombus. The veins are normally compressible and have normal phasic flow and augmentation response.    Right calf veins: The visualized calf veins are patent.    Left thigh veins: The common femoral, femoral, popliteal, upper greater saphenous, and deep femoral veins are patent and free of thrombus. The veins are normally compressible and have normal phasic flow and augmentation response.    Left calf veins: The visualized calf veins are patent.    Miscellaneous: None  Impression: No evidence of acute deep venous thrombosis in the left or right lower extremity veins.    Electronically signed by: Sravan Olivia MD  Date:    10/21/2024  Time:    19:48         ASSESSMENT & PLAN:      Intractable headache   Hypertension   Postpartum     Plan:   Etiology of headache could likely be worsening of underlying migraine headaches however will need to rule out intracranial pathology including posterior reversible encephalopathy syndrome, RCVS, cerebral sinus thrombosis.    MRI brain, MR angio brain and MRV brain ordered and pending.    If headache worsens, can consider Benadryl 25 mg, Compazine 10 mg every 8 hours.  One dose of magnesium sulfate 2 g ordered.  Continue with IV fluids.    P.r.n. Tylenol for headache.    Normalize blood pressure.    Will follow           Thank you kindly for including us in the care of this patient. Please do not hesitate to contact us with any questions.           Kashif Thornton MD  Neurology/vascular Neurology  Date of Service: 10/22/2024  10:02  AM    --------------------------------------------------------------------------------------------------------------------------------------------------------------------------------------------------------------------------------------------------------------  Please note: This note was transcribed using voice recognition software. Because of this technology there are often uinintended grammatical, spelling, and other transcription errors. Please disregard these errors.

## 2024-10-23 ENCOUNTER — CLINICAL SUPPORT (OUTPATIENT)
Dept: CARDIOLOGY | Facility: HOSPITAL | Age: 24
DRG: 776 | End: 2024-10-23
Attending: HOSPITALIST
Payer: MEDICAID

## 2024-10-23 VITALS
DIASTOLIC BLOOD PRESSURE: 90 MMHG | WEIGHT: 245 LBS | HEART RATE: 73 BPM | OXYGEN SATURATION: 96 % | RESPIRATION RATE: 18 BRPM | TEMPERATURE: 99 F | BODY MASS INDEX: 46.26 KG/M2 | SYSTOLIC BLOOD PRESSURE: 135 MMHG | HEIGHT: 61 IN

## 2024-10-23 LAB
AORTIC ROOT ANNULUS: 2.9 CM
AORTIC VALVE CUSP SEPERATION: 2 CM
APICAL FOUR CHAMBER EJECTION FRACTION: 66 %
APICAL TWO CHAMBER EJECTION FRACTION: 41 %
AV INDEX (PROSTH): 0.67
AV MEAN GRADIENT: 7 MMHG
AV PEAK GRADIENT: 13 MMHG
AV VALVE AREA BY VELOCITY RATIO: 2.5 CM²
AV VALVE AREA: 2.6 CM²
AV VELOCITY RATIO: 0.67
BACTERIA UR CULT: NORMAL
BACTERIA UR CULT: NORMAL
BSA FOR ECHO PROCEDURE: 2.19 M2
CV ECHO LV RWT: 0.48 CM
DOP CALC AO PEAK VEL: 1.8 M/S
DOP CALC AO VTI: 33.2 CM
DOP CALC LVOT AREA: 3.8 CM2
DOP CALC LVOT DIAMETER: 2.2 CM
DOP CALC LVOT PEAK VEL: 1.2 M/S
DOP CALC LVOT STROKE VOLUME: 84.7 CM3
DOP CALC MV VTI: 27.3 CM
DOP CALCLVOT PEAK VEL VTI: 22.3 CM
E WAVE DECELERATION TIME: 180 MSEC
E/A RATIO: 1.6
E/E' RATIO: 6.41 M/S
ECHO LV POSTERIOR WALL: 1.1 CM (ref 0.6–1.1)
FRACTIONAL SHORTENING: 34.8 % (ref 28–44)
INTERVENTRICULAR SEPTUM: 1.1 CM (ref 0.6–1.1)
IVC DIAMETER: 2.07 CM
LEFT INTERNAL DIMENSION IN SYSTOLE: 3 CM (ref 2.1–4)
LEFT VENTRICLE DIASTOLIC VOLUME INDEX: 46.29 ML/M2
LEFT VENTRICLE DIASTOLIC VOLUME: 95.36 ML
LEFT VENTRICLE END DIASTOLIC VOLUME APICAL 2 CHAMBER: 91.1 ML
LEFT VENTRICLE END DIASTOLIC VOLUME APICAL 4 CHAMBER: 121 ML
LEFT VENTRICLE MASS INDEX: 88 G/M2
LEFT VENTRICLE SYSTOLIC VOLUME INDEX: 16.4 ML/M2
LEFT VENTRICLE SYSTOLIC VOLUME: 33.87 ML
LEFT VENTRICULAR INTERNAL DIMENSION IN DIASTOLE: 4.6 CM (ref 3.5–6)
LEFT VENTRICULAR MASS: 181.2 G
LV LATERAL E/E' RATIO: 4.89 M/S
LV SEPTAL E/E' RATIO: 9.3 M/S
LVED V (TEICH): 95.36 ML
LVES V (TEICH): 33.87 ML
LVOT MG: 3 MMHG
LVOT MV: 0.8 CM/S
MV MEAN GRADIENT: 3 MMHG
MV PEAK A VEL: 0.58 M/S
MV PEAK E VEL: 0.93 M/S
MV PEAK GRADIENT: 5 MMHG
MV VALVE AREA BY CONTINUITY EQUATION: 3.1 CM2
OHS LV EJECTION FRACTION SIMPSONS BIPLANE MOD: 56 %
PISA TR MAX VEL: 2.5 M/S
PV MV: 0.96 M/S
PV PEAK GRADIENT: 8 MMHG
PV PEAK VELOCITY: 1.41 M/S
RA PRESSURE ESTIMATED: 3 MMHG
RV TB RVSP: 6 MMHG
RV TISSUE DOPPLER FREE WALL SYSTOLIC VELOCITY 1 (APICAL 4 CHAMBER VIEW): 18.7 CM/S
TDI LATERAL: 0.19 M/S
TDI SEPTAL: 0.1 M/S
TDI: 0.15 M/S
TR MAX PG: 25 MMHG
TRICUSPID ANNULAR PLANE SYSTOLIC EXCURSION: 3.07 CM
TV REST PULMONARY ARTERY PRESSURE: 28 MMHG
Z-SCORE OF LEFT VENTRICULAR DIMENSION IN END DIASTOLE: -3.02
Z-SCORE OF LEFT VENTRICULAR DIMENSION IN END SYSTOLE: -1.89

## 2024-10-23 PROCEDURE — 93306 TTE W/DOPPLER COMPLETE: CPT

## 2024-10-23 PROCEDURE — 25000003 PHARM REV CODE 250: Performed by: SPECIALIST

## 2024-10-23 RX ORDER — NIFEDIPINE 30 MG/1
30 TABLET, EXTENDED RELEASE ORAL DAILY
Qty: 30 TABLET | Refills: 11 | Status: SHIPPED | OUTPATIENT
Start: 2024-10-23 | End: 2025-10-23

## 2024-10-23 RX ORDER — NIFEDIPINE 30 MG/1
30 TABLET, EXTENDED RELEASE ORAL DAILY
Status: DISCONTINUED | OUTPATIENT
Start: 2024-10-23 | End: 2024-10-23 | Stop reason: HOSPADM

## 2024-10-23 RX ADMIN — LABETALOL HYDROCHLORIDE 200 MG: 200 TABLET, FILM COATED ORAL at 08:10

## 2024-10-23 RX ADMIN — NIFEDIPINE 30 MG: 30 TABLET, FILM COATED, EXTENDED RELEASE ORAL at 12:10

## 2024-10-23 NOTE — SUBJECTIVE & OBJECTIVE
Interval History:   Seen and examined at multidisciplinary rounds, doing well, no fever or chills, no nausea vomiting diarrhea.  Headache resolved.  BP is well controlled.  Renal sonogram negative for renal artery stenosis.  Pending 2D echo.     Review of Systems   Constitutional:  Negative for activity change and fever.   HENT:  Negative for ear discharge, facial swelling and sore throat.    Eyes:  Negative for photophobia, pain and visual disturbance.   Respiratory:  Negative for apnea, cough and shortness of breath.    Cardiovascular:  Negative for chest pain and leg swelling.   Gastrointestinal:  Negative for abdominal pain and blood in stool.   Endocrine: Negative for cold intolerance and heat intolerance.   Musculoskeletal:  Negative for back pain and gait problem.   Skin:  Negative for pallor and rash.   Neurological:  Positive for headaches. Negative for speech difficulty.   Psychiatric/Behavioral:  Negative for confusion, hallucinations and suicidal ideas.    All other systems reviewed and are negative.    Objective:     Vital Signs (Most Recent):  Temp: 98.9 °F (37.2 °C) (10/23/24 1203)  Pulse: 73 (10/23/24 1203)  Resp: 18 (10/23/24 1203)  BP: (!) 135/90 (10/23/24 1203)  SpO2: 96 % (10/23/24 0750) Vital Signs (24h Range):  Temp:  [98.2 °F (36.8 °C)-98.9 °F (37.2 °C)] 98.9 °F (37.2 °C)  Pulse:  [70-93] 73  Resp:  [15-18] 18  SpO2:  [96 %-100 %] 96 %  BP: (121-156)/(72-97) 135/90     Weight: 111.1 kg (245 lb)  Body mass index is 46.29 kg/m².  No intake or output data in the 24 hours ending 10/23/24 1318      Physical Exam  Constitutional:       Appearance: Normal appearance. She is obese.   HENT:      Head: Normocephalic and atraumatic.      Nose: Nose normal.   Eyes:      Extraocular Movements: Extraocular movements intact.      Pupils: Pupils are equal, round, and reactive to light.   Cardiovascular:      Rate and Rhythm: Normal rate and regular rhythm.      Heart sounds: No murmur heard.  Pulmonary:       Effort: Pulmonary effort is normal.      Breath sounds: Normal breath sounds.   Abdominal:      General: Abdomen is flat.      Palpations: Abdomen is soft.   Musculoskeletal:         General: Normal range of motion.      Cervical back: Normal range of motion and neck supple.   Skin:     General: Skin is warm and dry.   Neurological:      General: No focal deficit present.      Mental Status: She is alert and oriented to person, place, and time.   Psychiatric:         Mood and Affect: Mood normal.             Significant Labs: All pertinent labs within the past 24 hours have been reviewed.  CBC:   Recent Labs   Lab 10/21/24  1800   WBC 12.16   HGB 9.0*   HCT 30.0*   *     CMP:   Recent Labs   Lab 10/21/24  1800      K 3.7      CO2 24   GLU 91   BUN 10   CREATININE 0.7   CALCIUM 9.1   PROT 6.9   ALBUMIN 3.4*   BILITOT 0.2   ALKPHOS 89   AST 18   ALT 17   ANIONGAP 8       Significant Imaging: I have reviewed all pertinent imaging results/findings within the past 24 hours.  I have reviewed and interpreted all pertinent imaging results/findings within the past 24 hours.      US Renal Artery Stenosis Hyperten (xpd)    Result Date: 10/23/2024  EXAMINATION: US RENAL ARTERY STENOSIS HYPERTEN (XPD) CLINICAL HISTORY: hypertension; FINDINGS: Grayscale, color and spectral Doppler analysis of the kidneys, renal arteries, and aorta and retroperitoneum was performed. The right kidney measures 9.9 cm in length, with the left kidney measuring 10.7 cm in length, both with normal echotexture and without echogenic calculi or hydronephrosis. Peak systolic velocity in the main right renal artery is 105.8 cm/sec, with intrarenal arterial peak systolic velocities ranging from 36.8 cm/sec to 42.9 cm/sec, with intrarenal arterial resistive indices ranging from 0.56 to 0.61. Peak systolic velocity in the main left renal artery is 92.9 cm/sec, with intrarenal arterial peak systolic velocities ranging from 32.1 cm/sec to 44.2  cm/sec, with intrarenal arterial resistive indices ranging from 0.51 to 0.65. There are normal renal arterial spectral Doppler waveforms, with no parvus tardus morphology. The renal veins are patent. The aorta is normal in caliber and tapers appropriately, with normal color and spectral Doppler flow within the aortic lumen. Peak systolic velocity within the aorta is 103.9 cm/sec. The IVC is normal.     Normal renal arterial Doppler exam. Electronically signed by: Vinny Ritchie Date:    10/23/2024 Time:    07:56    MRV Brain Without Contrast    Result Date: 10/22/2024  EXAMINATION: MRV BRAIN WITHOUT CONTRAST CLINICAL HISTORY: Dural venous sinus thrombosis suspected; COMPARISON: None FINDINGS: There is degradation of the examination by motion. There is appropriate flow related signal within the major dural venous sinuses with no evidence of dural venous sinus thrombosis.     Motion degradation. No significant abnormality identified. Electronically signed by: Nikia Anguiano Date:    10/22/2024 Time:    14:17    MRA Brain without contrast    Result Date: 10/22/2024  EXAMINATION: MRA BRAIN WITHOUT CONTRAST CLINICAL HISTORY: R/o RCVS; TECHNIQUE: Non-contrast 3-D time-of-flight intracranial MR angiography was performed through the [place] [facility] [place] with MIP reformatting. COMPARISON: None FINDINGS: There is degradation by patient motion. Appropriate flow related signal is observed within the major intracranial arteries.  There are no findings of significant luminal narrowing or focal occlusion.  No aneurysm or vascular malformation is identified.     Motion degradation. No significant abnormality identified. Electronically signed by: Nikia Anguiano Date:    10/22/2024 Time:    14:10    MRI Brain Without Contrast    Result Date: 10/22/2024  EXAMINATION: MRI BRAIN WITHOUT CONTRAST CLINICAL HISTORY: Repeat for persistent headache. R/o PRES; TECHNIQUE: Multiplanar multisequence MR imaging of the brain was performed  IV contrast.  COMPARISON: MR brain 10/15/2024. FINDINGS: Gray matter distinction is preserved throughout the brain parenchyma.  The ventricles are midline without mass effect. Prominent pituitary gland is unchanged from prior exam.  No intra-axial hemorrhage or restricted diffusion.  No intra-axial fluid collection or mass.  Bone marrow signal of the calvarium is within normal limits.  Major vascular flow voids are within normal limits.  The orbits globes and optic nerves are grossly unremarkable.  Paranasal sinuses are unremarkable.     No acute intracranial abnormality observed. Electronically signed by: Silas De La Paz Date:    10/22/2024 Time:    13:44    X-Ray Chest 1 View    Result Date: 10/21/2024  EXAMINATION: XR CHEST 1 VIEW CLINICAL HISTORY: Shortness of breath TECHNIQUE: Single frontal view of the chest was performed. COMPARISON: 06/01/2009 FINDINGS: Cardiac silhouette is upper limits of normal for size.  Lungs are symmetrically expanded without evidence of confluent airspace consolidation.  No significant volume of pleural fluid or pneumothorax identified.  Osseous structures appear intact.     No convincing radiographic evidence of acute intrathoracic process on this single view. Electronically signed by: Adama Raman MD Date:    10/21/2024 Time:    20:49    US Lower Extremity Veins Bilateral    Result Date: 10/21/2024  EXAMINATION: US LOWER EXTREMITY VEINS BILATERAL CLINICAL HISTORY: Localized edema TECHNIQUE: Duplex and color flow Doppler and dynamic compression was performed of the bilateral lower extremity veins was performed. COMPARISON: None FINDINGS: Right thigh veins: The common femoral, femoral, popliteal, upper greater saphenous, and deep femoral veins are patent and free of thrombus. The veins are normally compressible and have normal phasic flow and augmentation response. Right calf veins: The visualized calf veins are patent. Left thigh veins: The common femoral, femoral, popliteal, upper greater  saphenous, and deep femoral veins are patent and free of thrombus. The veins are normally compressible and have normal phasic flow and augmentation response. Left calf veins: The visualized calf veins are patent. Miscellaneous: None     No evidence of acute deep venous thrombosis in the left or right lower extremity veins. Electronically signed by: Sravan Olivia MD Date:    10/21/2024 Time:    19:48    MRI Brain Without Contrast    Result Date: 10/15/2024  EXAMINATION: MRI BRAIN WITHOUT CONTRAST CLINICAL HISTORY: Headache, chronic, no new features; TECHNIQUE: Multiplanar multisequence MR imaging of the brain was performed without contrast. COMPARISON: Head CT 11/25/2023 FINDINGS: There is no abnormal restricted diffusion to suggest acute major vascular territory distribution infarct.  The ventricles are normal in size and configuration without evidence for hydrocephalus or midline shift. There are no abnormal areas of gradient susceptibility to suggest acute or remote parenchymal hemorrhage.  No extra-axial hemorrhage or fluid collections.  The craniocervical junction is within normal limits.  The pituitary gland is prominent in size with an upward convex margin, in close proximity to/abutting the optic chiasm.  This presumably reflects physiological enlargement of the pituitary in this reportedly pregnant patient.     1. Noncontrast MRI demonstrates no acute intracranial abnormality.  Specifically no evidence of major vascular territory distribution infarct. 2. Prominent/enlarged pituitary gland with upward convex margin which is in close proximity to/abutting the optic chiasm.  These findings presumably reflect physiological enlargement of the pituitary in this reported pregnant patient. Electronically signed by: Sue Raman MD Date:    10/15/2024 Time:    22:07  - pulls last radiology orders      Scheduled Meds:   labetalol  20 mg Intravenous Once    labetaloL  200 mg Oral Q12H    NIFEdipine  30 mg Oral Daily      Continuous Infusions:   0.9% NaCl   Intravenous Continuous 75 mL/hr at 10/22/24 1426 New Bag at 10/22/24 1426     PRN Meds:.  Current Facility-Administered Medications:     hydrALAZINE, 10 mg, Intravenous, Once PRN    hydrALAZINE, 5 mg, Intravenous, Once PRN    HYDROcodone-acetaminophen, 1 tablet, Oral, Q4H PRN    labetalol, 20 mg, Intravenous, Once PRN    labetalol, 40 mg, Intravenous, Once PRN    labetalol, 80 mg, Intravenous, Once PRN

## 2024-10-23 NOTE — PROGRESS NOTES
[facility]  Progress Note    Patient Name: Zoran Dubose  MRN: [id number]  Patient Class: IP- Inpatient   Admission Date: 10/21/2024  Length of Stay: 1 days  Attending Physician: Marya Spain MD  Primary Care Provider: June Zaragoza MD        Subjective:     Principal Problem:Primary hypertension        HPI:  Reason for consult:  Hypertension/ headache      This is a 34-year-old  female history of with hypertension, morbid obesity with a BMI 46.29 is admitted to labor and delivery for intractable headache and persistent hypertension.  Patient  had repeat  on 10/16/2024 secondary preE, patient was discharged with labetalol 100 mg b.i.d. however she never picked up this medication.  Now patient is presenting with bilateral frontal headache, not improved with OTC, associated with blurry vision.  No chest pain SOB, no leg edema.  Patient reported that she has long history of gestational hypertension/ primary hypertension from the last pregnancy, she was prescribed labetalol/ hydralazine prior however she stopped taking both medications prior to this pregnancy.  She uses marijuana occasionally.  She vaping occasionally.  Denies using other recreational drugs.  Denies drinking alcohol denies smoking cigarettes.  Patient also has history of migraine that occurred every menstruation improved with over-the-counter medications.   Patient has been evaluated by neurologist, MRI of brain/ MRV of brain/ MRA of the brain are negative for acute changes.  EKG:  Normal sinus rhythm no acute ST changes.  Patient has been placed on labetalol 200 mg b.i.d., Procardia 30 mg once a day.  BP improved.  Patient reported also improved.  Patient does not check her blood pressure at home routinely.     Overview/Hospital Course:  See HPI    Interval History:   Seen and examined at multidisciplinary rounds, doing well, no fever or chills, no nausea vomiting diarrhea.   Headache resolved.  BP is well controlled.  Renal sonogram negative for renal artery stenosis.  Pending 2D echo.     Review of Systems   Constitutional:  Negative for activity change and fever.   HENT:  Negative for ear discharge, facial swelling and sore throat.    Eyes:  Negative for photophobia, pain and visual disturbance.   Respiratory:  Negative for apnea, cough and shortness of breath.    Cardiovascular:  Negative for chest pain and leg swelling.   Gastrointestinal:  Negative for abdominal pain and blood in stool.   Endocrine: Negative for cold intolerance and heat intolerance.   Musculoskeletal:  Negative for back pain and gait problem.   Skin:  Negative for pallor and rash.   Neurological:  Positive for headaches. Negative for speech difficulty.   Psychiatric/Behavioral:  Negative for confusion, hallucinations and suicidal ideas.    All other systems reviewed and are negative.    Objective:     Vital Signs (Most Recent):  Temp: 98.9 °F (37.2 °C) (10/23/24 1203)  Pulse: 73 (10/23/24 1203)  Resp: 18 (10/23/24 1203)  BP: (!) 135/90 (10/23/24 1203)  SpO2: 96 % (10/23/24 0750) Vital Signs (24h Range):  Temp:  [98.2 °F (36.8 °C)-98.9 °F (37.2 °C)] 98.9 °F (37.2 °C)  Pulse:  [70-93] 73  Resp:  [15-18] 18  SpO2:  [96 %-100 %] 96 %  BP: (121-156)/(72-97) 135/90     Weight: 111.1 kg (245 lb)  Body mass index is 46.29 kg/m².  No intake or output data in the 24 hours ending 10/23/24 1318      Physical Exam  Constitutional:       Appearance: Normal appearance. She is obese.   HENT:      Head: Normocephalic and atraumatic.      Nose: Nose normal.   Eyes:      Extraocular Movements: Extraocular movements intact.      Pupils: Pupils are equal, round, and reactive to light.   Cardiovascular:      Rate and Rhythm: Normal rate and regular rhythm.      Heart sounds: No murmur heard.  Pulmonary:      Effort: Pulmonary effort is normal.      Breath sounds: Normal breath sounds.   Abdominal:      General: Abdomen is flat.       Palpations: Abdomen is soft.   Musculoskeletal:         General: Normal range of motion.      Cervical back: Normal range of motion and neck supple.   Skin:     General: Skin is warm and dry.   Neurological:      General: No focal deficit present.      Mental Status: She is alert and oriented to person, place, and time.   Psychiatric:         Mood and Affect: Mood normal.             Significant Labs: All pertinent labs within the past 24 hours have been reviewed.  CBC:   Recent Labs   Lab 10/21/24  1800   WBC 12.16   HGB 9.0*   HCT 30.0*   *     CMP:   Recent Labs   Lab 10/21/24  1800      K 3.7      CO2 24   GLU 91   BUN 10   CREATININE 0.7   CALCIUM 9.1   PROT 6.9   ALBUMIN 3.4*   BILITOT 0.2   ALKPHOS 89   AST 18   ALT 17   ANIONGAP 8       Significant Imaging: I have reviewed all pertinent imaging results/findings within the past 24 hours.  I have reviewed and interpreted all pertinent imaging results/findings within the past 24 hours.      US Renal Artery Stenosis Hyperten (xpd)    Result Date: 10/23/2024  EXAMINATION: US RENAL ARTERY STENOSIS HYPERTEN (XPD) CLINICAL HISTORY: hypertension; FINDINGS: Grayscale, color and spectral Doppler analysis of the kidneys, renal arteries, and aorta and retroperitoneum was performed. The right kidney measures 9.9 cm in length, with the left kidney measuring 10.7 cm in length, both with normal echotexture and without echogenic calculi or hydronephrosis. Peak systolic velocity in the main right renal artery is 105.8 cm/sec, with intrarenal arterial peak systolic velocities ranging from 36.8 cm/sec to 42.9 cm/sec, with intrarenal arterial resistive indices ranging from 0.56 to 0.61. Peak systolic velocity in the main left renal artery is 92.9 cm/sec, with intrarenal arterial peak systolic velocities ranging from 32.1 cm/sec to 44.2 cm/sec, with intrarenal arterial resistive indices ranging from 0.51 to 0.65. There are normal renal arterial spectral Doppler  waveforms, with no parvus tardus morphology. The renal veins are patent. The aorta is normal in caliber and tapers appropriately, with normal color and spectral Doppler flow within the aortic lumen. Peak systolic velocity within the aorta is 103.9 cm/sec. The IVC is normal.     Normal renal arterial Doppler exam. Electronically signed by: Vinny Ritchie Date:    10/23/2024 Time:    07:56    MRV Brain Without Contrast    Result Date: 10/22/2024  EXAMINATION: MRV BRAIN WITHOUT CONTRAST CLINICAL HISTORY: Dural venous sinus thrombosis suspected; COMPARISON: None FINDINGS: There is degradation of the examination by motion. There is appropriate flow related signal within the major dural venous sinuses with no evidence of dural venous sinus thrombosis.     Motion degradation. No significant abnormality identified. Electronically signed by: Nikia Anguiano Date:    10/22/2024 Time:    14:17    MRA Brain without contrast    Result Date: 10/22/2024  EXAMINATION: MRA BRAIN WITHOUT CONTRAST CLINICAL HISTORY: R/o RCVS; TECHNIQUE: Non-contrast 3-D time-of-flight intracranial MR angiography was performed through the [place] of [place] with MIP reformatting. COMPARISON: None FINDINGS: There is degradation by patient motion. Appropriate flow related signal is observed within the major intracranial arteries.  There are no findings of significant luminal narrowing or focal occlusion.  No aneurysm or vascular malformation is identified.     Motion degradation. No significant abnormality identified. Electronically signed by: Nikia Anguiano Date:    10/22/2024 Time:    14:10    MRI Brain Without Contrast    Result Date: 10/22/2024  EXAMINATION: MRI BRAIN WITHOUT CONTRAST CLINICAL HISTORY: Repeat for persistent headache. R/o PRES; TECHNIQUE: Multiplanar multisequence MR imaging of the brain was performed  IV contrast. COMPARISON: MR brain 10/15/2024. FINDINGS: Gray matter distinction is preserved throughout the brain parenchyma.  The ventricles are  midline without mass effect. Prominent pituitary gland is unchanged from prior exam.  No intra-axial hemorrhage or restricted diffusion.  No intra-axial fluid collection or mass.  Bone marrow signal of the calvarium is within normal limits.  Major vascular flow voids are within normal limits.  The orbits globes and optic nerves are grossly unremarkable.  Paranasal sinuses are unremarkable.     No acute intracranial abnormality observed. Electronically signed by: Silas De La Paz Date:    10/22/2024 Time:    13:44    X-Ray Chest 1 View    Result Date: 10/21/2024  EXAMINATION: XR CHEST 1 VIEW CLINICAL HISTORY: Shortness of breath TECHNIQUE: Single frontal view of the chest was performed. COMPARISON: 06/01/2009 FINDINGS: Cardiac silhouette is upper limits of normal for size.  Lungs are symmetrically expanded without evidence of confluent airspace consolidation.  No significant volume of pleural fluid or pneumothorax identified.  Osseous structures appear intact.     No convincing radiographic evidence of acute intrathoracic process on this single view. Electronically signed by: Adama Raman MD Date:    10/21/2024 Time:    20:49    US Lower Extremity Veins Bilateral    Result Date: 10/21/2024  EXAMINATION: US LOWER EXTREMITY VEINS BILATERAL CLINICAL HISTORY: Localized edema TECHNIQUE: Duplex and color flow Doppler and dynamic compression was performed of the bilateral lower extremity veins was performed. COMPARISON: None FINDINGS: Right thigh veins: The common femoral, femoral, popliteal, upper greater saphenous, and deep femoral veins are patent and free of thrombus. The veins are normally compressible and have normal phasic flow and augmentation response. Right calf veins: The visualized calf veins are patent. Left thigh veins: The common femoral, femoral, popliteal, upper greater saphenous, and deep femoral veins are patent and free of thrombus. The veins are normally compressible and have normal phasic flow and  augmentation response. Left calf veins: The visualized calf veins are patent. Miscellaneous: None     No evidence of acute deep venous thrombosis in the left or right lower extremity veins. Electronically signed by: Sravan Olivia MD Date:    10/21/2024 Time:    19:48    MRI Brain Without Contrast    Result Date: 10/15/2024  EXAMINATION: MRI BRAIN WITHOUT CONTRAST CLINICAL HISTORY: Headache, chronic, no new features; TECHNIQUE: Multiplanar multisequence MR imaging of the brain was performed without contrast. COMPARISON: Head CT 11/25/2023 FINDINGS: There is no abnormal restricted diffusion to suggest acute major vascular territory distribution infarct.  The ventricles are normal in size and configuration without evidence for hydrocephalus or midline shift. There are no abnormal areas of gradient susceptibility to suggest acute or remote parenchymal hemorrhage.  No extra-axial hemorrhage or fluid collections.  The craniocervical junction is within normal limits.  The pituitary gland is prominent in size with an upward convex margin, in close proximity to/abutting the optic chiasm.  This presumably reflects physiological enlargement of the pituitary in this reportedly pregnant patient.     1. Noncontrast MRI demonstrates no acute intracranial abnormality.  Specifically no evidence of major vascular territory distribution infarct. 2. Prominent/enlarged pituitary gland with upward convex margin which is in close proximity to/abutting the optic chiasm.  These findings presumably reflect physiological enlargement of the pituitary in this reported pregnant patient. Electronically signed by: Sue Raman MD Date:    10/15/2024 Time:    22:07  - pulls last radiology orders      Scheduled Meds:   labetalol  20 mg Intravenous Once    labetaloL  200 mg Oral Q12H    NIFEdipine  30 mg Oral Daily     Continuous Infusions:   0.9% NaCl   Intravenous Continuous 75 mL/hr at 10/22/24 1426 New Bag at 10/22/24 1426     PRN Meds:.  Current  Facility-Administered Medications:     hydrALAZINE, 10 mg, Intravenous, Once PRN    hydrALAZINE, 5 mg, Intravenous, Once PRN    HYDROcodone-acetaminophen, 1 tablet, Oral, Q4H PRN    labetalol, 20 mg, Intravenous, Once PRN    labetalol, 40 mg, Intravenous, Once PRN    labetalol, 80 mg, Intravenous, Once PRN      Assessment/Plan:      * Primary hypertension  Patients blood pressure range in the last 24 hours was: BP  Min: 92/51  Max: 180/105.The patient's inpatient anti-hypertensive regimen is listed below:  Current Antihypertensives  labetaloL injection 20 mg, Once, Intravenous  hydrALAZINE injection 5 mg, Once as needed, Intravenous  hydrALAZINE injection 10 mg, Once as needed, Intravenous  labetaloL injection 20 mg, Once as needed, Intravenous  labetaloL injection 40 mg, Once as needed, Intravenous  labetaloL injection 80 mg, Once as needed, Intravenous  labetaloL tablet 200 mg, Every 12 hours, Oral  NIFEdipine (PROCARDIA-XL) 24 hr tablet, Daily, Oral  NIFEdipine 24 hr tablet 30 mg, Daily, Oral    Plan  - BP is controlled, no changes needed to their regimen      Most likely patient has primary hypertension on top of gestional hypertension.   Patient needs to be on maintain antihypertensive medication after delivery.   Continue labetalol and Procardia at the current dosage.       Patient will need further secondary  hypertension workup outpatient.   Renal artery sonogram negative for stenosis.   Pending 2D echo.   Headache much resolved after blood pressure uncontrolled.   Okay to discharge with current blood pressure medication regimen.     Okay to discharge for medical perspective if echo is unremarkable.   We will sign off, please feel free to call me if any questions.     Intractable headache    Most likely secondary to uncontrolled hypertension versus migraine.   MRI/ MRA/ MRV of brain negative for acute changes.   Neurologist following    Gestational hypertension, third trimester    As above      VTE Risk  Mitigation (From admission, onward)      None            Discharge Planning   GALILEA:      Code Status: Prior   Is the patient medically ready for discharge?:     Reason for patient still in hospital (select all that apply): Patient trending condition and Treatment  Discharge Plan A: Home with family   Discharge Delays: None known at this time              Codi Castillo MD  Department of Hospital Medicine   [facility]

## 2024-10-23 NOTE — DISCHARGE INSTRUCTIONS
Pelvic rest for 6 weeks (no sex, tampons, douching, nothing in the vagina)    You can experience vaginal bleeding on and off for up to 6 weeks, it will gradually get lighter and the color will change from bright red to a brownish discharge towards the end.    Activity:  NO strenuous activities or exercising for 6 weeks.  Do not /lift anything over 15 pounds and no heavy housework or cleaning for 6 weeks.  Limit stair climbing to twice a day during the first 2 weeks.   NO driving for 4 weeks.  You may take short car trips but do not drive.    You may shower ONLY for the first 2 weeks, after 2 weeks you can soak in a bathtub.  Use a mild soap, no heavy perfumes or fragrances to avoid irritation.     Walking frequently following a  delivery promotes healing and decreases pain associated with gas.   If constipation develops:  You may take Colace (stool softener), Milk of Magnesia, Dulcolax or Miralax.  All of these medications are sold over the counter.      Incision Care:   Clean your incision with mild soap & warm water only- do not scrub- let warm water run over it, then pat dry.      Pain Relief:  You may take Motrin for mild pain & uterine cramping.      Emotional Changes:  You may experience baby blues after delivery.  You may feel let down, anxious and cry easily.  This is normal.  These feelings can begin 2-3 days after delivery and usually disappear in about a week or two.  Prolonged sadness may indicate postpartum depression.      Call your doctor for any of the following:  *********IF YOU ARE EXPERIENCING SYMPTOMS OF HIGH BLOOD PRESSURE PLEASE FOLLOW UP WITH YOUR PROVIDER WITHIN 3 DAYS OF DISCHARGE********  Difficulty breathing, problems with any of your medications, inability to eat.    Foul smelling vaginal discharge.  If you notice pus-like drainage from your incision, if your incision or the area around it becomes hot or swollen, or if you notice a foul smelling odor.  Temperature above  100.4.    Heavy vaginal bleeding.  All women bleed different after delivery and each delivery is different.  Heavy bleeding consists of saturating a treva pad in a 1 hour time period.  Passing clots are normal, if you pass a blood clot larger than the size of a golf ball call your doctor's office.   If you experience pain in your legs/calves, if one leg increases in size and becomes swollen or becomes hot to touch or discolored.   Crying or periods of sadness beyond 2 weeks.      If you are breast feeding:  Wash your breasts with mild soap and warm water.  You should wear a supportive bra.  You should continue to take a prenatal vitamin for 6 weeks or until breastfeeding is discontinued.  If nipples are sore, apply a few drops of breast milk after nursing and let air dry or you can use Lanolin cream.   If breasts are engorged, apply warmth and express milk.

## 2024-10-23 NOTE — ASSESSMENT & PLAN NOTE
Patients blood pressure range in the last 24 hours was: BP  Min: 92/51  Max: 180/105.The patient's inpatient anti-hypertensive regimen is listed below:  Current Antihypertensives  labetaloL injection 20 mg, Once, Intravenous  hydrALAZINE injection 5 mg, Once as needed, Intravenous  hydrALAZINE injection 10 mg, Once as needed, Intravenous  labetaloL injection 20 mg, Once as needed, Intravenous  labetaloL injection 40 mg, Once as needed, Intravenous  labetaloL injection 80 mg, Once as needed, Intravenous  labetaloL tablet 200 mg, Every 12 hours, Oral  NIFEdipine (PROCARDIA-XL) 24 hr tablet, Daily, Oral  NIFEdipine 24 hr tablet 30 mg, Daily, Oral    Plan  - BP is controlled, no changes needed to their regimen      Most likely patient has primary hypertension on top of gestional hypertension.   Patient needs to be on maintain antihypertensive medication after delivery.   Continue labetalol and Procardia at the current dosage.       Patient will need further secondary  hypertension workup outpatient.   Renal artery sonogram negative for stenosis.   Pending 2D echo.   Headache much resolved after blood pressure uncontrolled.   Okay to discharge with current blood pressure medication regimen.     Okay to discharge for medical perspective if echo is unremarkable.   We will sign off, please feel free to call me if any questions.

## 2024-10-23 NOTE — DISCHARGE SUMMARY
[facility]  Obstetrics & Gynecology  Discharge Summary    Patient Name: Zoran Dubose  MRN: [id number]  Admission Date: 10/21/2024  Hospital Length of Stay: 1 days  Discharge Date and Time:  10/23/2024 7:59 AM  Attending Physician: Marya Spain MD   Discharging Provider: Marya Spain MD  Primary Care Provider: Juen Zaragoza MD    HPI:  24-year-old postpartum hypertension.  She has now been in the hospital for 2 days.  Hospitalist consult secondary to continued elevated blood pressures.  Blood pressures do seem to be responding to labetalol 200 mg twice a day and Procardia XL daily.    Physical exam:   Patient denies any headaches this morning  Most recent blood pressure 122/72  Afebrile  Incision clean dry and intact  Extremities no Homans or edema     Assessment and plan:  Postpartum hypertension with previous gestational hypertension leading to  delivery   She will continue on her current labetalol 200 mg twice a day and Procardia XL daily   Hospitalist has ordered a echocardiogram    All studies have been within normal limits including MRI MRA MRV renal artery ultrasound.  Once the patient gets her echocardiogram she is stable for discharge.      Goals of Care Treatment Preferences:  Code Status: Full Code            Significant Diagnostic Studies: Labs: CMP   Recent Labs   Lab 10/21/24  1800      K 3.7      CO2 24   GLU 91   BUN 10   CREATININE 0.7   CALCIUM 9.1   PROT 6.9   ALBUMIN 3.4*   BILITOT 0.2   ALKPHOS 89   AST 18   ALT 17   ANIONGAP 8   , CBC   Recent Labs   Lab 10/21/24  1800   WBC 12.16   HGB 9.0*   HCT 30.0*   *   , and All labs within the past 24 hours have been reviewed  Lab Results   Component Value Date    GROUPTRH A POS 10/16/2024         Pending Diagnostic Studies:       Procedure Component Value Units Date/Time    Echo [[id number]]     Order Status: Sent Lab Status: No result           Final Active Diagnoses:    Diagnosis Date Noted POA     PRINCIPAL PROBLEM:  New daily persistent headache [G44.52] 10/22/2024 Yes    Intractable headache [R51.9] 10/22/2024 Unknown    Primary hypertension [I10] 10/22/2024 Unknown    Gestational hypertension, third trimester [O13.3] 2023 Yes      Problems Resolved During this Admission:        Discharged Condition: good    Disposition: Home or Self Care    Follow Up:    Patient Instructions:      Diet Adult Regular     No driving until:   Order Comments: 14 days for vaginal delivery  28 days for  Section     No dressing needed     Leave dressing on - Keep it clean, dry, and intact until clinic visit     Activity as tolerated   Order Comments: Pelvic rest x 6 weeks     Medications:  She he number I can Reconciled Home Medications:      Medication List        START taking these medications      NIFEdipine 30 MG (OSM) 24 hr tablet  Commonly known as: PROCARDIA-XL  Take 2 tablets (60 mg total) by mouth once daily.            ASK your doctor about these medications      butalbital-acetaminophen-caffeine -40 mg -40 mg per tablet  Commonly known as: FIORICET, ESGIC  Take 1 tablet by mouth every 8 (eight) hours as needed for headache.     labetaloL 100 MG tablet  Commonly known as: NORMODYNE  Take 1 tablet (100 mg total) by mouth every 12 (twelve) hours.     PRENA1 ORAL  Take by mouth.              Marya Spain MD  Obstetrics & Gynecology  [facility]

## 2024-10-23 NOTE — PROGRESS NOTES
"[facility]  Department of Neurology  Progress Note  Date: 10/23/2024 9:38 AM          Patient Name: Zoran Dubose   MRN: [id number]   : 2000    AGE: 24 y.o.    LOS: 1 days Hospital Day: 3  Admit date: 10/21/2024  5:31 PM       10/23/2024: No acute events overnight. Patient was seen and examined by me this morning. Neuro exam is normal.  She denies any headache this morning.  Blood pressure has been mostly less than 150 systolic.    Vitals:  Patient Vitals for the past 24 hrs:   BP Temp Temp src Pulse Resp SpO2 Height Weight   10/23/24 0750 (!) 134/90 98.3 °F (36.8 °C) Oral 93 18 96 % -- --   10/23/24 0440 121/72 98.2 °F (36.8 °C) Oral 82 15 99 % -- --   10/22/24 2230 (!) 149/92 98.2 °F (36.8 °C) Oral 85 18 100 % -- --   10/22/24 2000 (!) 147/92 98.2 °F (36.8 °C) Oral 70 18 100 % -- --   10/22/24 1939 -- -- -- -- 18 -- -- --   10/22/24 1715 (!) 143/82 98.3 °F (36.8 °C) Oral 71 18 100 % 5' 1" (1.549 m) 111.1 kg (245 lb)   10/22/24 1551 (!) 147/97 -- -- 75 -- -- -- --   10/22/24 1535 (!) 156/96 -- -- 75 -- -- -- --   10/22/24 1401 (!) 149/96 -- -- 76 -- -- -- --   10/22/24 1359 (!) 149/96 -- -- 76 -- -- -- --   10/22/24 1214 (!) 155/95 -- -- 88 -- -- -- --   10/22/24 1209 -- -- -- 68 -- 100 % -- --   10/22/24 1204 -- -- -- 71 -- 100 % -- --   10/22/24 1159 -- -- -- 67 -- 100 % -- --   10/22/24 1034 -- -- -- 61 -- 100 % -- --   10/22/24 1029 -- -- -- (!) 54 -- 100 % -- --   10/22/24 1024 -- -- -- 79 -- 99 % -- --   10/22/24 1019 -- -- -- 85 -- 100 % -- --   10/22/24 1014 -- -- -- (!) 58 -- 100 % -- --   10/22/24 1009 -- -- -- 76 -- 100 % -- --   10/22/24 1004 -- -- -- 79 -- 100 % -- --   10/22/24 0959 -- -- -- 75 -- 100 % -- --   10/22/24 0954 -- -- -- 76 -- 100 % -- --   10/22/24 0949 -- -- -- 85 -- 100 % -- --   10/22/24 0944 -- -- -- 82 -- 100 % -- --   10/22/24 0939 -- -- -- 82 -- 100 % -- --     PHYSICAL EXAM:     GENERAL APPEARANCE: Well-developed, well-nourished female in no acute " "distress.  HEENT: Normocephalic and atraumatic. PERRL. Oropharynx unremarkable.  PULM: Comfortable on room air.  CV: RRR.  ABDOMEN: Soft, nontender.  EXTREMITIES: No signs of vascular compromise. Pulses present. No cyanosis, clubbing or edema.  SKIN: Clear; no rashes, lesions or skin breaks in exposed areas.      NEURO:   MENTAL STATUS: Patient awake and oriented to time, place, and person. Affect normal.  CRANIAL NERVES II-XII: Pupils equal, round and reactive to light. Extraocular movements full and intact. No facial asymmetry.  MOTOR: Normal bulk. Tone normal and symmetrical throughout.  No abnormal movements. No tremor.   Strength 5/5 throughout unless specified below.  REFLEXES: DTRs 2+; normal and symmetric throughout.   SENSATION: Sensation grossly intact to fine touch.  COORDINATION: Finger-to-nose normal for age and symmetric.  STATION: Romberg deferred.  GAIT: Deferred.    CURRENT SCHEDULED MEDICATIONS:   labetalol  20 mg Intravenous Once    labetaloL  200 mg Oral Q12H    NIFEdipine  30 mg Oral Daily     CURRENT INFUSIONS:   0.9% NaCl   Intravenous Continuous 75 mL/hr at 10/22/24 1426 New Bag at 10/22/24 1426     DATA:  Recent Labs   Lab 10/21/24  1800      K 3.7      CO2 24   BUN 10   CREATININE 0.7   GLU 91   CALCIUM 9.1   PHOS 4.0   MG 1.9   AST 18   ALT 17     Recent Labs   Lab 10/16/24  1926 10/21/24  1800   WBC 20.14* 12.16   HGB 9.0* 9.0*   HCT 30.5* 30.0*    482*     No results found for: "PROTEINCSF", "GLUCCSF", "WBCCSF", "RBCCSF", "LYMPHCSF", "PMNCSF"  Hemoglobin A1C   Date Value Ref Range Status   11/13/2017 5.3 4.0 - 5.6 % Final     Comment:     According to ADA guidelines, hemoglobin A1c <7.0% represents  optimal control in non-pregnant diabetic patients. Different  metrics may apply to specific patient populations.   Standards of Medical Care in Diabetes-2016.  For the purpose of screening for the presence of diabetes:  <5.7%     Consistent with the absence of " diabetes  5.7-6.4%  Consistent with increasing risk for diabetes   (prediabetes)  >or=6.5%  Consistent with diabetes  Currently, no consensus exists for use of hemoglobin A1c  for diagnosis of diabetes for children.  This Hemoglobin A1c assay has significant interference with fetal   hemoglobin   (HbF). The results are invalid for patients with abnormal amounts of   HbF,   including those with known Hereditary Persistence   of Fetal Hemoglobin. Heterozygous hemoglobin variants (HbAS, HbAC,   HbAD, HbAE, HbA2) do not significantly interfere with this assay;   however, presence of multiple variants in a sample may impact the %   interference.     02/13/2017 5.9 4.5 - 6.2 % Final     Comment:     According to ADA guidelines, hemoglobin A1C <7.0% represents  optimal control in non-pregnant diabetic patients.  Different  metrics may apply to specific populations.   Standards of Medical Care in Diabetes - 2016.  For the purpose of screening for the presence of diabetes:  <5.7%     Consistent with the absence of diabetes  5.7-6.4%  Consistent with increasing risk for diabetes   (prediabetes)  >or=6.5%  Consistent with diabetes  Currently no consensus exists for use of hemoglobin A1C  for diagnosis of diabetes for children.              I have personally reviewed and interpreted the pertinent imaging and lab results.  Imaging Results              X-Ray Chest 1 View (Final result)  Result time 10/21/24 20:49:05      Final result by Adama Raman MD (10/21/24 20:49:05)                   Impression:      No convincing radiographic evidence of acute intrathoracic process on this single view.      Electronically signed by: Adama Raman MD  Date:    10/21/2024  Time:    20:49               Narrative:    EXAMINATION:  XR CHEST 1 VIEW    CLINICAL HISTORY:  Shortness of breath    TECHNIQUE:  Single frontal view of the chest was performed.    COMPARISON:  06/01/2009    FINDINGS:  Cardiac silhouette is upper limits of normal  for size.  Lungs are symmetrically expanded without evidence of confluent airspace consolidation.  No significant volume of pleural fluid or pneumothorax identified.  Osseous structures appear intact.                                       US Lower Extremity Veins Bilateral (Final result)  Result time 10/21/24 19:48:19      Final result by Immanuel Olivia MD (10/21/24 19:48:19)                   Impression:      No evidence of acute deep venous thrombosis in the left or right lower extremity veins.      Electronically signed by: Sravan Olivia MD  Date:    10/21/2024  Time:    19:48               Narrative:    EXAMINATION:  US LOWER EXTREMITY VEINS BILATERAL    CLINICAL HISTORY:  Localized edema    TECHNIQUE:  Duplex and color flow Doppler and dynamic compression was performed of the bilateral lower extremity veins was performed.    COMPARISON:  None    FINDINGS:  Right thigh veins: The common femoral, femoral, popliteal, upper greater saphenous, and deep femoral veins are patent and free of thrombus. The veins are normally compressible and have normal phasic flow and augmentation response.    Right calf veins: The visualized calf veins are patent.    Left thigh veins: The common femoral, femoral, popliteal, upper greater saphenous, and deep femoral veins are patent and free of thrombus. The veins are normally compressible and have normal phasic flow and augmentation response.    Left calf veins: The visualized calf veins are patent.    Miscellaneous: None                                           ASSESSMENT AND PLAN:       Intractable headache   Hypertension   Postpartum      Plan:   Etiology of headache could likely be worsening of underlying migraine versus secondary to hypertension.  Workup with MRI brain, MR angio brain and MRV brain showed no significant acute pathology.  Headache was treated with migraine cocktail.  Currently improved headache.  Will hold off on prophylactic therapy for migraine as patient  only has about 3 headaches days a month.  P.r.n. Tylenol for headache.    Normalize blood pressure.    Recommend outpatient neurology follow-up for further migraine management.          Kashif Thornton MD  Neurology/vascular Neurology  Date of Service: 10/23/2024  9:38 AM    Please note: This note was transcribed using voice recognition software. Because of this technology there are often uinintended grammatical, spelling, and other transcription errors. Please disregard these errors.

## 2024-10-24 ENCOUNTER — PATIENT MESSAGE (OUTPATIENT)
Dept: OBSTETRICS AND GYNECOLOGY | Facility: HOSPITAL | Age: 24
End: 2024-10-24

## 2024-10-24 LAB
OHS QRS DURATION: 86 MS
OHS QTC CALCULATION: 404 MS

## 2024-11-06 ENCOUNTER — PATIENT MESSAGE (OUTPATIENT)
Dept: CARDIOLOGY | Facility: HOSPITAL | Age: 24
End: 2024-11-06

## (undated) DEVICE — POWDER ARISTA AH 3G

## (undated) DEVICE — PAD BOVIE ADULT

## (undated) DEVICE — DRESSING POST OP MEPILEX  AG  4X12

## (undated) DEVICE — AGENT HEMOSTATIC ARISTA 3GR

## (undated) DEVICE — HEMOSTAT SURGICEL 4X8

## (undated) DEVICE — BINDER 12 4-PANEL 45-62